# Patient Record
Sex: FEMALE | Race: WHITE | Employment: OTHER | URBAN - NONMETROPOLITAN AREA
[De-identification: names, ages, dates, MRNs, and addresses within clinical notes are randomized per-mention and may not be internally consistent; named-entity substitution may affect disease eponyms.]

---

## 2017-03-08 ENCOUNTER — OFFICE VISIT (OUTPATIENT)
Dept: GASTROENTEROLOGY | Age: 72
End: 2017-03-08
Payer: MEDICARE

## 2017-03-08 VITALS
HEART RATE: 70 BPM | HEIGHT: 65 IN | OXYGEN SATURATION: 98 % | SYSTOLIC BLOOD PRESSURE: 112 MMHG | DIASTOLIC BLOOD PRESSURE: 72 MMHG | BODY MASS INDEX: 21.99 KG/M2 | WEIGHT: 132 LBS

## 2017-03-08 DIAGNOSIS — Z86.010 PERSONAL HISTORY OF COLONIC POLYPS: ICD-10-CM

## 2017-03-08 DIAGNOSIS — Z83.71 FAMILY HISTORY OF POLYPS IN THE COLON: ICD-10-CM

## 2017-03-08 DIAGNOSIS — Z12.11 ENCOUNTER FOR SCREENING COLONOSCOPY: Primary | ICD-10-CM

## 2017-03-08 PROBLEM — Z83.719 FAMILY HISTORY OF POLYPS IN THE COLON: Status: ACTIVE | Noted: 2017-03-08

## 2017-03-08 PROBLEM — Z86.0100 PERSONAL HISTORY OF COLONIC POLYPS: Status: ACTIVE | Noted: 2017-03-08

## 2017-03-08 PROCEDURE — 4040F PNEUMOC VAC/ADMIN/RCVD: CPT | Performed by: NURSE PRACTITIONER

## 2017-03-08 PROCEDURE — G8419 CALC BMI OUT NRM PARAM NOF/U: HCPCS | Performed by: NURSE PRACTITIONER

## 2017-03-08 PROCEDURE — 1036F TOBACCO NON-USER: CPT | Performed by: NURSE PRACTITIONER

## 2017-03-08 PROCEDURE — 1090F PRES/ABSN URINE INCON ASSESS: CPT | Performed by: NURSE PRACTITIONER

## 2017-03-08 PROCEDURE — 99214 OFFICE O/P EST MOD 30 MIN: CPT | Performed by: NURSE PRACTITIONER

## 2017-03-08 PROCEDURE — G8484 FLU IMMUNIZE NO ADMIN: HCPCS | Performed by: NURSE PRACTITIONER

## 2017-03-08 PROCEDURE — 3014F SCREEN MAMMO DOC REV: CPT | Performed by: NURSE PRACTITIONER

## 2017-03-08 PROCEDURE — G8427 DOCREV CUR MEDS BY ELIG CLIN: HCPCS | Performed by: NURSE PRACTITIONER

## 2017-03-08 PROCEDURE — 1123F ACP DISCUSS/DSCN MKR DOCD: CPT | Performed by: NURSE PRACTITIONER

## 2017-03-08 PROCEDURE — 3017F COLORECTAL CA SCREEN DOC REV: CPT | Performed by: NURSE PRACTITIONER

## 2017-03-08 PROCEDURE — G8399 PT W/DXA RESULTS DOCUMENT: HCPCS | Performed by: NURSE PRACTITIONER

## 2017-03-08 RX ORDER — LORAZEPAM 1 MG/1
2 TABLET ORAL NIGHTLY
COMMUNITY

## 2017-03-08 RX ORDER — LOSARTAN POTASSIUM 50 MG/1
100 TABLET ORAL DAILY
COMMUNITY

## 2017-03-08 ASSESSMENT — ENCOUNTER SYMPTOMS
SORE THROAT: 0
ANAL BLEEDING: 0
BACK PAIN: 0
NAUSEA: 0
BLOOD IN STOOL: 0
CHOKING: 0
PHOTOPHOBIA: 0
WHEEZING: 0
COUGH: 0
ABDOMINAL DISTENTION: 0
DIARRHEA: 0
ABDOMINAL PAIN: 0
RECTAL PAIN: 0
VOMITING: 0
CONSTIPATION: 0

## 2017-03-28 ENCOUNTER — TRANSCRIBE ORDERS (OUTPATIENT)
Dept: ADMINISTRATIVE | Facility: HOSPITAL | Age: 72
End: 2017-03-28

## 2017-03-28 DIAGNOSIS — Z12.31 ENCOUNTER FOR SCREENING MAMMOGRAM FOR MALIGNANT NEOPLASM OF BREAST: Primary | ICD-10-CM

## 2017-03-29 ENCOUNTER — HOSPITAL ENCOUNTER (OUTPATIENT)
Dept: MAMMOGRAPHY | Facility: HOSPITAL | Age: 72
Discharge: HOME | End: 2017-03-29
Attending: INTERNAL MEDICINE | Admitting: INTERNAL MEDICINE

## 2017-03-29 DIAGNOSIS — Z12.31 ENCOUNTER FOR SCREENING MAMMOGRAM FOR MALIGNANT NEOPLASM OF BREAST: ICD-10-CM

## 2017-03-29 PROCEDURE — G0202 SCR MAMMO BI INCL CAD: HCPCS

## 2017-03-29 PROCEDURE — 77063 BREAST TOMOSYNTHESIS BI: CPT

## 2017-04-13 ENCOUNTER — HOSPITAL ENCOUNTER (OUTPATIENT)
Age: 72
Setting detail: OUTPATIENT SURGERY
Discharge: HOME | End: 2017-04-13
Attending: INTERNAL MEDICINE | Admitting: INTERNAL MEDICINE
Payer: MEDICARE

## 2017-04-13 ENCOUNTER — ANESTHESIA EVENT (OUTPATIENT)
Dept: ENDOSCOPY | Age: 72
End: 2017-04-13
Payer: MEDICARE

## 2017-04-13 ENCOUNTER — ANESTHESIA (OUTPATIENT)
Dept: ENDOSCOPY | Age: 72
End: 2017-04-13
Payer: MEDICARE

## 2017-04-13 VITALS
RESPIRATION RATE: 18 BRPM | WEIGHT: 129 LBS | HEIGHT: 65 IN | SYSTOLIC BLOOD PRESSURE: 143 MMHG | TEMPERATURE: 98 F | DIASTOLIC BLOOD PRESSURE: 63 MMHG | BODY MASS INDEX: 21.49 KG/M2 | OXYGEN SATURATION: 100 % | HEART RATE: 65 BPM

## 2017-04-13 VITALS
SYSTOLIC BLOOD PRESSURE: 108 MMHG | OXYGEN SATURATION: 96 % | RESPIRATION RATE: 16 BRPM | DIASTOLIC BLOOD PRESSURE: 47 MMHG

## 2017-04-13 PROCEDURE — 7100000011 HC PHASE II RECOVERY - ADDTL 15 MIN: Performed by: INTERNAL MEDICINE

## 2017-04-13 PROCEDURE — 3609009500 HC COLONOSCOPY DIAGNOSTIC OR SCREENING: Performed by: INTERNAL MEDICINE

## 2017-04-13 PROCEDURE — 2500000003 HC RX 250 WO HCPCS: Performed by: NURSE ANESTHETIST, CERTIFIED REGISTERED

## 2017-04-13 PROCEDURE — 6360000002 HC RX W HCPCS: Performed by: NURSE ANESTHETIST, CERTIFIED REGISTERED

## 2017-04-13 PROCEDURE — 3700000000 HC ANESTHESIA ATTENDED CARE: Performed by: INTERNAL MEDICINE

## 2017-04-13 PROCEDURE — 2580000003 HC RX 258: Performed by: INTERNAL MEDICINE

## 2017-04-13 PROCEDURE — 7100000010 HC PHASE II RECOVERY - FIRST 15 MIN: Performed by: INTERNAL MEDICINE

## 2017-04-13 PROCEDURE — 3700000001 HC ADD 15 MINUTES (ANESTHESIA): Performed by: INTERNAL MEDICINE

## 2017-04-13 PROCEDURE — G0105 COLORECTAL SCRN; HI RISK IND: HCPCS | Performed by: INTERNAL MEDICINE

## 2017-04-13 RX ORDER — LIDOCAINE HYDROCHLORIDE 20 MG/ML
INJECTION, SOLUTION INFILTRATION; PERINEURAL PRN
Status: DISCONTINUED | OUTPATIENT
Start: 2017-04-13 | End: 2017-04-13 | Stop reason: SDUPTHER

## 2017-04-13 RX ORDER — DIPHENHYDRAMINE HYDROCHLORIDE 50 MG/ML
12.5 INJECTION INTRAMUSCULAR; INTRAVENOUS
Status: DISCONTINUED | OUTPATIENT
Start: 2017-04-13 | End: 2017-04-13 | Stop reason: HOSPADM

## 2017-04-13 RX ORDER — PROPOFOL 10 MG/ML
INJECTION, EMULSION INTRAVENOUS PRN
Status: DISCONTINUED | OUTPATIENT
Start: 2017-04-13 | End: 2017-04-13 | Stop reason: SDUPTHER

## 2017-04-13 RX ORDER — PROMETHAZINE HYDROCHLORIDE 25 MG/ML
6.25 INJECTION, SOLUTION INTRAMUSCULAR; INTRAVENOUS
Status: DISCONTINUED | OUTPATIENT
Start: 2017-04-13 | End: 2017-04-13 | Stop reason: HOSPADM

## 2017-04-13 RX ORDER — LANOLIN ALCOHOL/MO/W.PET/CERES
500 CREAM (GRAM) TOPICAL NIGHTLY
COMMUNITY
End: 2021-12-20

## 2017-04-13 RX ORDER — SODIUM CHLORIDE, SODIUM LACTATE, POTASSIUM CHLORIDE, CALCIUM CHLORIDE 600; 310; 30; 20 MG/100ML; MG/100ML; MG/100ML; MG/100ML
INJECTION, SOLUTION INTRAVENOUS CONTINUOUS
Status: DISCONTINUED | OUTPATIENT
Start: 2017-04-13 | End: 2017-04-13 | Stop reason: HOSPADM

## 2017-04-13 RX ORDER — ONDANSETRON 2 MG/ML
4 INJECTION INTRAMUSCULAR; INTRAVENOUS
Status: DISCONTINUED | OUTPATIENT
Start: 2017-04-13 | End: 2017-04-13 | Stop reason: HOSPADM

## 2017-04-13 RX ORDER — LIDOCAINE HYDROCHLORIDE 10 MG/ML
1 INJECTION, SOLUTION INFILTRATION; PERINEURAL ONCE
Status: DISCONTINUED | OUTPATIENT
Start: 2017-04-13 | End: 2017-04-13 | Stop reason: HOSPADM

## 2017-04-13 RX ADMIN — LIDOCAINE HYDROCHLORIDE 40 MG: 20 INJECTION, SOLUTION INFILTRATION; PERINEURAL at 09:11

## 2017-04-13 RX ADMIN — SODIUM CHLORIDE, POTASSIUM CHLORIDE, SODIUM LACTATE AND CALCIUM CHLORIDE: 600; 310; 30; 20 INJECTION, SOLUTION INTRAVENOUS at 08:25

## 2017-04-13 RX ADMIN — PROPOFOL 300 MG: 10 INJECTION, EMULSION INTRAVENOUS at 09:11

## 2017-04-13 ASSESSMENT — PAIN SCALES - GENERAL
PAINLEVEL_OUTOF10: 0
PAINLEVEL_OUTOF10: 0

## 2017-04-13 ASSESSMENT — PAIN - FUNCTIONAL ASSESSMENT: PAIN_FUNCTIONAL_ASSESSMENT: 0-10

## 2017-05-08 ENCOUNTER — TELEPHONE (OUTPATIENT)
Dept: VASCULAR SURGERY | Facility: CLINIC | Age: 72
End: 2017-05-08

## 2017-07-05 ENCOUNTER — TELEPHONE (OUTPATIENT)
Dept: UROLOGY | Facility: CLINIC | Age: 72
End: 2017-07-05

## 2017-07-05 DIAGNOSIS — N20.0 RENAL CALCULUS, BILATERAL: Primary | ICD-10-CM

## 2017-07-11 ENCOUNTER — PROCEDURE VISIT (OUTPATIENT)
Dept: UROLOGY | Facility: CLINIC | Age: 72
End: 2017-07-11

## 2017-07-11 VITALS
HEIGHT: 65 IN | WEIGHT: 128 LBS | DIASTOLIC BLOOD PRESSURE: 84 MMHG | BODY MASS INDEX: 21.33 KG/M2 | TEMPERATURE: 97.5 F | SYSTOLIC BLOOD PRESSURE: 122 MMHG

## 2017-07-11 DIAGNOSIS — N95.2 ATROPHIC VAGINITIS: Primary | ICD-10-CM

## 2017-07-11 DIAGNOSIS — N39.0 URINARY TRACT INFECTION, SITE UNSPECIFIED: ICD-10-CM

## 2017-07-11 LAB
BILIRUB BLD-MCNC: NEGATIVE MG/DL
CLARITY, POC: CLEAR
COLOR UR: YELLOW
GLUCOSE UR STRIP-MCNC: NEGATIVE MG/DL
KETONES UR QL: NEGATIVE
LEUKOCYTE EST, POC: ABNORMAL
NITRITE UR-MCNC: NEGATIVE MG/ML
PH UR: 7 [PH] (ref 5–8)
PROT UR STRIP-MCNC: NEGATIVE MG/DL
RBC # UR STRIP: ABNORMAL /UL
SP GR UR: 1.01 (ref 1–1.03)
UROBILINOGEN UR QL: NORMAL

## 2017-07-11 PROCEDURE — 81003 URINALYSIS AUTO W/O SCOPE: CPT | Performed by: UROLOGY

## 2017-07-11 PROCEDURE — 51701 INSERT BLADDER CATHETER: CPT | Performed by: UROLOGY

## 2017-07-11 PROCEDURE — 87186 SC STD MICRODIL/AGAR DIL: CPT | Performed by: UROLOGY

## 2017-07-11 PROCEDURE — 87086 URINE CULTURE/COLONY COUNT: CPT | Performed by: UROLOGY

## 2017-07-11 PROCEDURE — 99211 OFF/OP EST MAY X REQ PHY/QHP: CPT | Performed by: UROLOGY

## 2017-07-11 PROCEDURE — 87088 URINE BACTERIA CULTURE: CPT | Performed by: UROLOGY

## 2017-07-11 NOTE — PROGRESS NOTES
Patient of Dr. Ba is here today complaining of burning and frequency. The patient denies any fever, chills, N&V. Patient then had a catheter placed and urine was collected in a sterile matter. UA Auto Dip ran and urine sent for C&S.    Dr. Toure was in the office at the time of procedure. Patient was advised that we will call with results. Patient verbalized understanding.     reviewed

## 2017-07-13 ENCOUNTER — TELEPHONE (OUTPATIENT)
Dept: UROLOGY | Facility: CLINIC | Age: 72
End: 2017-07-13

## 2017-07-13 DIAGNOSIS — N30.00 ACUTE CYSTITIS WITHOUT HEMATURIA: Primary | ICD-10-CM

## 2017-07-13 LAB — BACTERIA SPEC AEROBE CULT: ABNORMAL

## 2017-07-13 RX ORDER — SULFAMETHOXAZOLE AND TRIMETHOPRIM 400; 80 MG/1; MG/1
1 TABLET ORAL 2 TIMES DAILY
Qty: 6 TABLET | Refills: 0 | Status: SHIPPED | OUTPATIENT
Start: 2017-07-13 | End: 2017-08-24

## 2017-07-14 ENCOUNTER — TELEPHONE (OUTPATIENT)
Dept: UROLOGY | Facility: CLINIC | Age: 72
End: 2017-07-14

## 2017-07-14 NOTE — TELEPHONE ENCOUNTER
----- Message from Lazaro Ba MD sent at 7/13/2017  4:59 PM CDT -----  Patient has a positive urine culture.  I left a voicemail on her answering machine because she was not available and I felt it important to put her on antibiotics tonight.  I called in a prescription to [facility] and will have the staff follow-up that she got the message that the antibiotic is been called in.  I called in Bactrim DS 1 by mouth twice a day for 3 days.

## 2017-07-18 ENCOUNTER — HOSPITAL ENCOUNTER (OUTPATIENT)
Dept: GENERAL RADIOLOGY | Facility: HOSPITAL | Age: 72
Discharge: HOME | End: 2017-07-18
Attending: UROLOGY | Admitting: UROLOGY

## 2017-07-18 PROCEDURE — 74000 HC ABDOMEN KUB: CPT

## 2017-07-20 ENCOUNTER — OFFICE VISIT (OUTPATIENT)
Dept: UROLOGY | Facility: CLINIC | Age: 72
End: 2017-07-20

## 2017-07-20 VITALS
SYSTOLIC BLOOD PRESSURE: 114 MMHG | BODY MASS INDEX: 21.33 KG/M2 | DIASTOLIC BLOOD PRESSURE: 82 MMHG | TEMPERATURE: 97.6 F | WEIGHT: 128 LBS | HEIGHT: 65 IN

## 2017-07-20 DIAGNOSIS — N30.00 ACUTE CYSTITIS WITHOUT HEMATURIA: ICD-10-CM

## 2017-07-20 DIAGNOSIS — N20.0 RENAL CALCULUS, BILATERAL: Primary | ICD-10-CM

## 2017-07-20 LAB
BILIRUB BLD-MCNC: NEGATIVE MG/DL
CLARITY, POC: CLEAR
COLOR UR: YELLOW
GLUCOSE UR STRIP-MCNC: NEGATIVE MG/DL
KETONES UR QL: NEGATIVE
LEUKOCYTE EST, POC: ABNORMAL
NITRITE UR-MCNC: NEGATIVE MG/ML
PH UR: 6 [PH] (ref 5–8)
PROT UR STRIP-MCNC: NEGATIVE MG/DL
RBC # UR STRIP: ABNORMAL /UL
SP GR UR: 1.02 (ref 1–1.03)
UROBILINOGEN UR QL: NORMAL

## 2017-07-20 PROCEDURE — 99213 OFFICE O/P EST LOW 20 MIN: CPT | Performed by: UROLOGY

## 2017-07-20 PROCEDURE — 81003 URINALYSIS AUTO W/O SCOPE: CPT | Performed by: UROLOGY

## 2017-07-20 RX ORDER — SODIUM PHOSPHATE,MONO-DIBASIC 19G-7G/118
ENEMA (ML) RECTAL
COMMUNITY
End: 2018-12-12

## 2017-07-20 RX ORDER — UREA 10 %
27 LOTION (ML) TOPICAL 2 TIMES DAILY
COMMUNITY
End: 2018-07-24

## 2017-07-20 NOTE — PROGRESS NOTES
Ms. Juarez is 71 y.o. female    CHIEF COMPLAINT: I am here today to follow up for Kidney Stones. I did complete Imaging on Monday.    HPI  Renal Urolithiasis  The patient presents with bilateral renal urolithiasis. This was initially diagnosed approximately 4 years ago. This was identified on CT that included abdominal cuts. This was found in the context of an evaluation of hx of urolithiaisis. This is a(n) established problem for the patient. The onset of this was unknown. The course is stable. The patient is currently being managed by dietary alterations and hydration. Current symptoms that may or may not be related to this stone include none reported.    Recent cystitis symptoms have improved. Her dysuria is better.     The following portions of the patient's history were reviewed and updated as appropriate: allergies, current medications, past family history, past medical history, past social history, past surgical history and problem list.    Review of Systems   Constitutional: Negative for chills and fever.   Gastrointestinal: Negative for abdominal pain, anal bleeding and blood in stool.   Genitourinary: Negative for flank pain and hematuria.         Current Outpatient Prescriptions:   •  calcium citrate-vitamin d (CITRACAL) 200-250 MG-UNIT tablet tablet, Take  by mouth Daily., Disp: , Rfl:   •  glucosamine sulfate 500 MG capsule capsule, Take  by mouth 3 (Three) Times a Day With Meals., Disp: , Rfl:   •  magnesium, as, gluconate (MAGONATE) 500 (27 MG) MG tablet, Take 27 mg by mouth 2 (Two) Times a Day., Disp: , Rfl:   •  cetirizine (ZyrTEC) 10 MG tablet, Take 10 mg by mouth daily., Disp: , Rfl:   •  fluticasone (FLONASE) 50 MCG/ACT nasal spray, 2 sprays into each nostril daily. Administer 2 sprays in each nostril for each dose., Disp: , Rfl:   •  levothyroxine (SYNTHROID, LEVOTHROID) 125 MCG tablet, Take 125 mcg by mouth daily., Disp: , Rfl:   •  lisinopril (PRINIVIL,ZESTRIL) 10 MG tablet, Take 10 mg by  "mouth daily., Disp: , Rfl:   •  LORazepam (ATIVAN) 1 MG tablet, Take 1 mg by mouth every 8 (eight) hours as needed for anxiety., Disp: , Rfl:   •  meloxicam (MOBIC) 15 MG tablet, Take 1 tablet by mouth daily., Disp: , Rfl: 5  •  Omega 3-6-9 Fatty Acids (OMEGA 3-6-9 COMPLEX PO), Take  by mouth., Disp: , Rfl:   •  sulfamethoxazole-trimethoprim (BACTRIM,SEPTRA) 400-80 MG tablet, Take 1 tablet by mouth 2 (Two) Times a Day., Disp: 6 tablet, Rfl: 0  •  temazepam (RESTORIL) 30 MG capsule, Take 1 capsule by mouth daily., Disp: , Rfl: 2    Past Medical History:   Diagnosis Date   • Abdominal pain    • Anticoagulant-induced hematuria    • Calculus of kidney     LEFT   • Clot hematuria    • Dysuria    • Hypothyroidism    • Urgency of urination    • UTI (urinary tract infection) with pyuria        Past Surgical History:   Procedure Laterality Date   • CHOLECYSTECTOMY     • CYSTOSCOPY  09/2002   • HERNIA REPAIR     • TONSILLECTOMY         Social History     Social History   • Marital status:      Spouse name: N/A   • Number of children: N/A   • Years of education: N/A     Social History Main Topics   • Smoking status: Former Smoker   • Smokeless tobacco: None   • Alcohol use No   • Drug use: No   • Sexual activity: Not Asked     Other Topics Concern   • None     Social History Narrative       Family History   Problem Relation Age of Onset   • Prostate cancer Father    • No Known Problems Mother        /82  Temp 97.6 °F (36.4 °C)  Ht 65\" (165.1 cm)  Wt 128 lb (58.1 kg)  BMI 21.3 kg/m2    Physical Exam  Alert and oriented ×3  Not agitated or distressed  No obvious deformities  No respiratory distress  Skin without pallor or diaphoresis      Results for orders placed or performed in visit on 07/20/17   POC Urinalysis Dipstick, Automated   Result Value Ref Range    Color Yellow Yellow, Straw, Dark Yellow, Arlene    Clarity, UA Clear Clear    Glucose, UA Negative Negative, 1000 mg/dL (3+) mg/dL    Bilirubin Negative " Negative    Ketones, UA Negative Negative    Specific Gravity  1.020 1.005 - 1.030    Blood, UA Small (A) Negative    pH, Urine 6.0 5.0 - 8.0    Protein, POC Negative Negative mg/dL    Urobilinogen, UA Normal Normal    Leukocytes Trace (A) Negative    Nitrite, UA Negative Negative     Independent visualization/interpretation of KUB  A KUB is available for me to review today.  The image is inspected for a bowel gas pattern and the general bone structure of the spine and pelvis. The kidneys are then inspected closely.  Renal outline is noted if identifiable. The kidney, collecting system, and anticipated path of the ureter are examined for calcifications including those in the true pelvis.  This film reveals: small stones in the left lower pole. I think I see some small stones in the right lower pole as well.       Assessment and Plan  Diagnoses and all orders for this visit:    Renal calculus, bilateral  -     POC Urinalysis Dipstick, Automated    Acute cystitis without hematuria    The patient has been diagnosed with renal urolithiasis. Location is described as  bilateral. The patient's options for therapy are discussed based on the size, location, stone burden, and symptoms.  The decision has been made to follow these stones radiographically without treatment.  The patient understands the risks associated with the conservative approach, but this is a reasonable treatment plan.  The need to contract me for hematuria, fever, recurrent UTI's, flank pain or change in ideology is explained.  Established problem,stable,chronic    Her urinary tract infection has resolved. REcently treated with Bactrim DS for three days for an Ecoli UTI. Urine is clear today.     Lazaro Ba MD  07/20/17  2:35 PM      Cc: Dr. Hair Echols

## 2017-08-24 ENCOUNTER — OFFICE VISIT (OUTPATIENT)
Dept: UROLOGY | Facility: CLINIC | Age: 72
End: 2017-08-24

## 2017-08-24 VITALS — TEMPERATURE: 97.2 F | WEIGHT: 128 LBS | BODY MASS INDEX: 21.33 KG/M2 | HEIGHT: 65 IN

## 2017-08-24 DIAGNOSIS — R39.15 URINARY URGENCY: Primary | ICD-10-CM

## 2017-08-24 DIAGNOSIS — R30.0 DYSURIA: ICD-10-CM

## 2017-08-24 DIAGNOSIS — N95.2 ATROPHIC VAGINITIS: ICD-10-CM

## 2017-08-24 LAB
BILIRUB BLD-MCNC: NEGATIVE MG/DL
CLARITY, POC: CLEAR
COLOR UR: YELLOW
GLUCOSE UR STRIP-MCNC: NEGATIVE MG/DL
KETONES UR QL: NEGATIVE
LEUKOCYTE EST, POC: ABNORMAL
NITRITE UR-MCNC: POSITIVE MG/ML
PH UR: 7 [PH] (ref 5–8)
PROT UR STRIP-MCNC: ABNORMAL MG/DL
RBC # UR STRIP: ABNORMAL /UL
SP GR UR: 1.02 (ref 1–1.03)
UROBILINOGEN UR QL: NORMAL

## 2017-08-24 PROCEDURE — 99214 OFFICE O/P EST MOD 30 MIN: CPT | Performed by: UROLOGY

## 2017-08-24 PROCEDURE — 87086 URINE CULTURE/COLONY COUNT: CPT | Performed by: UROLOGY

## 2017-08-24 PROCEDURE — 81003 URINALYSIS AUTO W/O SCOPE: CPT | Performed by: UROLOGY

## 2017-08-24 PROCEDURE — 51701 INSERT BLADDER CATHETER: CPT | Performed by: UROLOGY

## 2017-08-24 PROCEDURE — 87186 SC STD MICRODIL/AGAR DIL: CPT | Performed by: UROLOGY

## 2017-08-24 PROCEDURE — 87088 URINE BACTERIA CULTURE: CPT | Performed by: UROLOGY

## 2017-08-24 NOTE — PROGRESS NOTES
Ms. Juarez is 72 y.o. female    CHIEF COMPLAINT: I am here today because of urinary urgency.     HPI  She presents today with acute onset of urgency and frequency.  She has some terminal dysuria noted.  All the pain is at the urethral area.  In the past antibiotics that helped the symptoms.  She has recently finished another course of antibiotic.  This episode started 5 days ago.  There is been no associated fever or flank pain.  The following portions of the patient's history were reviewed and updated as appropriate: allergies, current medications, past family history, past medical history, past social history, past surgical history and problem list.    Review of Systems   Constitutional: Negative for chills and fever.   Gastrointestinal: Negative for abdominal pain, anal bleeding and blood in stool.   Genitourinary: Negative for flank pain and hematuria.         Current Outpatient Prescriptions:   •  calcium citrate-vitamin d (CITRACAL) 200-250 MG-UNIT tablet tablet, Take  by mouth Daily., Disp: , Rfl:   •  cetirizine (ZyrTEC) 10 MG tablet, Take 10 mg by mouth daily., Disp: , Rfl:   •  fluticasone (FLONASE) 50 MCG/ACT nasal spray, 2 sprays into each nostril daily. Administer 2 sprays in each nostril for each dose., Disp: , Rfl:   •  glucosamine sulfate 500 MG capsule capsule, Take  by mouth 3 (Three) Times a Day With Meals., Disp: , Rfl:   •  levothyroxine (SYNTHROID, LEVOTHROID) 125 MCG tablet, Take 125 mcg by mouth daily., Disp: , Rfl:   •  lisinopril (PRINIVIL,ZESTRIL) 10 MG tablet, Take 10 mg by mouth daily., Disp: , Rfl:   •  LORazepam (ATIVAN) 1 MG tablet, Take 1 mg by mouth every 8 (eight) hours as needed for anxiety., Disp: , Rfl:   •  magnesium, as, gluconate (MAGONATE) 500 (27 MG) MG tablet, Take 27 mg by mouth 2 (Two) Times a Day., Disp: , Rfl:   •  meloxicam (MOBIC) 15 MG tablet, Take 1 tablet by mouth daily., Disp: , Rfl: 5  •  Omega 3-6-9 Fatty Acids (OMEGA 3-6-9 COMPLEX PO), Take  by mouth., Disp:  ", Rfl:   •  temazepam (RESTORIL) 30 MG capsule, Take 1 capsule by mouth daily., Disp: , Rfl: 2    Past Medical History:   Diagnosis Date   • Abdominal pain    • Anticoagulant-induced hematuria    • Calculus of kidney     LEFT   • Clot hematuria    • Dysuria    • Hypothyroidism    • Urgency of urination    • UTI (urinary tract infection) with pyuria        Past Surgical History:   Procedure Laterality Date   • CHOLECYSTECTOMY     • CYSTOSCOPY  09/2002   • HERNIA REPAIR     • TONSILLECTOMY         Social History     Social History   • Marital status:      Spouse name: N/A   • Number of children: N/A   • Years of education: N/A     Social History Main Topics   • Smoking status: Former Smoker   • Smokeless tobacco: None   • Alcohol use No   • Drug use: No   • Sexual activity: Not Asked     Other Topics Concern   • None     Social History Narrative       Family History   Problem Relation Age of Onset   • Prostate cancer Father    • No Known Problems Mother        Temp 97.2 °F (36.2 °C)  Ht 65\" (165.1 cm)  Wt 128 lb (58.1 kg)  BMI 21.3 kg/m2    Physical Exam   Constitutional: She is oriented to person, place, and time. She appears well-developed and well-nourished. No distress.   Neck: Normal range of motion. Neck supple. No tracheal deviation present. No thyromegaly (no mass) present.   Cardiovascular: Intact distal pulses.    No significant edema or varicosities. No tenderness   Pulmonary/Chest: Effort normal. No respiratory distress. She exhibits no tenderness.   No intercostal retractions, use of accessory muscles or asymmetric movements.    Abdominal: Soft. Normal appearance. She exhibits no distension, no ascites and no mass. There is no hepatosplenomegaly. There is no tenderness. There is no rigidity, no rebound, no guarding and no CVA tenderness. No hernia.   Stool specimen is not indicated    Genitourinary: Vagina normal and uterus normal. Pelvic exam was performed with patient supine. There is no rash, " tenderness or lesion on the right labia. There is no rash, tenderness or lesion on the left labia. Uterus is not enlarged and not tender. Cervix exhibits no motion tenderness, no discharge and no friability. Right adnexum displays no mass, no tenderness and no fullness. Left adnexum displays no mass, no tenderness and no fullness. No tenderness in the vagina. No vaginal discharge found.   Genitourinary Comments: Normal hair distribution. The urethra is without mass and normal in appearance. The bladder is palpably without mass or tenderness. Vaginal epithelium is very thin particularly around the urethra.  There is complete loss of rugae .  Atrophy is considered significant. The anus and perineum are normal.    Lymphadenopathy:     She has no cervical adenopathy.        Right: No inguinal adenopathy present.        Left: No inguinal adenopathy present.   Neurological: She is alert and oriented to person, place, and time.   Skin: No ecchymosis and no rash noted. She is not diaphoretic. No cyanosis or erythema. Nails show no clubbing.   Psychiatric: She has a normal mood and affect. Her behavior is normal.   Vitals reviewed.        Results for orders placed or performed in visit on 08/24/17   POC Urinalysis Dipstick, Automated   Result Value Ref Range    Color Yellow Yellow, Straw, Dark Yellow, Arlene    Clarity, UA Clear Clear    Glucose, UA Negative Negative, 1000 mg/dL (3+) mg/dL    Bilirubin Negative Negative    Ketones, UA Negative Negative    Specific Gravity  1.020 1.005 - 1.030    Blood, UA Moderate (A) Negative    pH, Urine 7.0 5.0 - 8.0    Protein, POC 30 mg/dL (A) Negative mg/dL    Urobilinogen, UA Normal Normal    Leukocytes Large (3+) (A) Negative    Nitrite, UA Positive (A) Negative     Procedure note for in and out catheterization  She is placed in the dorsal lithotomy position.  She is carefully prepped with Betadine around the urethra.  A pediatric catheterization kit is used which contains an  approximate 5 Nepalese catheter.this is introduced into the urinary bladder.  Approximately 30 mL of urine is obtained and sent to the lab for culture and sensitivity.    Assessment and Plan  Diagnoses and all orders for this visit:    Urinary urgency  -     POC Urinalysis Dipstick, Automated  -     Urine Culture    Dysuria    Atrophic vaginitis      #1.  We will await these urine culture results and start her on antibiotics if they come back positive.  She has both penicillin and sulfa allergy which limit oral antibiotic availability.  #2.  She has significant evidence of atrophic vaginitis.  After we resolve whether or not she has a current infection I think she should go on some type of topical hormone therapy.  #3.  If her culture is positive she will need to undergo cystoscopy.Cystoscopy as the definitive lower urinary tract study is discussed . The risks of pain and discomfort, infection, and urethral stricture are discussed with the patient including the technique used in the office setting.  All patient questions were answered.   #4.  I will probably put her on antibiotic prophylaxis after she finishes it course of antibiotics should she grow bacteria on this culture.    Lazaro Ba MD  08/24/17  4:18 PM    Cc: Hair Echols MD

## 2017-08-25 ENCOUNTER — HOSPITAL ENCOUNTER (EMERGENCY)
Age: 72
Discharge: HOSPICE | End: 2017-08-26
Attending: EMERGENCY MEDICINE
Payer: MEDICARE

## 2017-08-25 ENCOUNTER — APPOINTMENT (OUTPATIENT)
Dept: CT IMAGING | Age: 72
End: 2017-08-25
Payer: MEDICARE

## 2017-08-25 VITALS
WEIGHT: 129 LBS | HEART RATE: 100 BPM | HEIGHT: 65 IN | BODY MASS INDEX: 21.49 KG/M2 | OXYGEN SATURATION: 98 % | SYSTOLIC BLOOD PRESSURE: 147 MMHG | RESPIRATION RATE: 18 BRPM | DIASTOLIC BLOOD PRESSURE: 64 MMHG | TEMPERATURE: 98.1 F

## 2017-08-25 DIAGNOSIS — N10 ACUTE PYELONEPHRITIS: Primary | ICD-10-CM

## 2017-08-25 LAB
ALBUMIN SERPL-MCNC: 4.3 G/DL (ref 3.5–5.2)
ALP BLD-CCNC: 85 U/L (ref 35–104)
ALT SERPL-CCNC: 21 U/L (ref 5–33)
ANION GAP SERPL CALCULATED.3IONS-SCNC: 15 MMOL/L (ref 7–19)
AST SERPL-CCNC: 21 U/L (ref 5–32)
BACTERIA: ABNORMAL /HPF
BASOPHILS ABSOLUTE: 0.1 K/UL (ref 0–0.2)
BASOPHILS RELATIVE PERCENT: 0.4 % (ref 0–1)
BILIRUB SERPL-MCNC: 0.8 MG/DL (ref 0.2–1.2)
BILIRUBIN URINE: NEGATIVE
BLOOD, URINE: ABNORMAL
BUN BLDV-MCNC: 20 MG/DL (ref 8–23)
CALCIUM SERPL-MCNC: 9.9 MG/DL (ref 8.8–10.2)
CHLORIDE BLD-SCNC: 97 MMOL/L (ref 98–111)
CLARITY: ABNORMAL
CO2: 24 MMOL/L (ref 22–29)
COLOR: YELLOW
CREAT SERPL-MCNC: 0.7 MG/DL (ref 0.5–0.9)
EOSINOPHILS ABSOLUTE: 0 K/UL (ref 0–0.6)
EOSINOPHILS RELATIVE PERCENT: 0.1 % (ref 0–5)
EPITHELIAL CELLS, UA: 0 /HPF (ref 0–5)
GFR NON-AFRICAN AMERICAN: >60
GLUCOSE BLD-MCNC: 111 MG/DL (ref 74–109)
GLUCOSE URINE: NEGATIVE MG/DL
HCT VFR BLD CALC: 38.1 % (ref 37–47)
HEMOGLOBIN: 12.5 G/DL (ref 12–16)
HYALINE CASTS: 5 /HPF (ref 0–8)
KETONES, URINE: NEGATIVE MG/DL
LEUKOCYTE ESTERASE, URINE: ABNORMAL
LIPASE: 23 U/L (ref 13–60)
LYMPHOCYTES ABSOLUTE: 1.4 K/UL (ref 1.1–4.5)
LYMPHOCYTES RELATIVE PERCENT: 9.2 % (ref 20–40)
MCH RBC QN AUTO: 30.9 PG (ref 27–31)
MCHC RBC AUTO-ENTMCNC: 32.8 G/DL (ref 33–37)
MCV RBC AUTO: 94.3 FL (ref 81–99)
MONOCYTES ABSOLUTE: 0.8 K/UL (ref 0–0.9)
MONOCYTES RELATIVE PERCENT: 5.5 % (ref 0–10)
NEUTROPHILS ABSOLUTE: 12.8 K/UL (ref 1.5–7.5)
NEUTROPHILS RELATIVE PERCENT: 84.4 % (ref 50–65)
NITRITE, URINE: POSITIVE
PDW BLD-RTO: 12.8 % (ref 11.5–14.5)
PH UA: 7.5
PLATELET # BLD: 204 K/UL (ref 130–400)
PMV BLD AUTO: 9.8 FL (ref 9.4–12.3)
POTASSIUM SERPL-SCNC: 4 MMOL/L (ref 3.5–5)
PROTEIN UA: NEGATIVE MG/DL
RBC # BLD: 4.04 M/UL (ref 4.2–5.4)
RBC UA: 9 /HPF (ref 0–4)
SODIUM BLD-SCNC: 136 MMOL/L (ref 136–145)
SPECIFIC GRAVITY UA: 1.02
TOTAL PROTEIN: 8.5 G/DL (ref 6.6–8.7)
UROBILINOGEN, URINE: 0.2 E.U./DL
WBC # BLD: 15.2 K/UL (ref 4.8–10.8)
WBC UA: 53 /HPF (ref 0–5)

## 2017-08-25 PROCEDURE — 80053 COMPREHEN METABOLIC PANEL: CPT

## 2017-08-25 PROCEDURE — 99284 EMERGENCY DEPT VISIT MOD MDM: CPT

## 2017-08-25 PROCEDURE — 6360000004 HC RX CONTRAST MEDICATION: Performed by: EMERGENCY MEDICINE

## 2017-08-25 PROCEDURE — 74177 CT ABD & PELVIS W/CONTRAST: CPT

## 2017-08-25 PROCEDURE — 96374 THER/PROPH/DIAG INJ IV PUSH: CPT

## 2017-08-25 PROCEDURE — 96375 TX/PRO/DX INJ NEW DRUG ADDON: CPT

## 2017-08-25 PROCEDURE — 83690 ASSAY OF LIPASE: CPT

## 2017-08-25 PROCEDURE — 87086 URINE CULTURE/COLONY COUNT: CPT

## 2017-08-25 PROCEDURE — 85025 COMPLETE CBC W/AUTO DIFF WBC: CPT

## 2017-08-25 PROCEDURE — 36415 COLL VENOUS BLD VENIPUNCTURE: CPT

## 2017-08-25 PROCEDURE — 81001 URINALYSIS AUTO W/SCOPE: CPT

## 2017-08-25 PROCEDURE — 99283 EMERGENCY DEPT VISIT LOW MDM: CPT | Performed by: EMERGENCY MEDICINE

## 2017-08-25 PROCEDURE — 2580000003 HC RX 258: Performed by: EMERGENCY MEDICINE

## 2017-08-25 PROCEDURE — 6360000002 HC RX W HCPCS: Performed by: EMERGENCY MEDICINE

## 2017-08-25 PROCEDURE — 87185 SC STD ENZYME DETCJ PER NZM: CPT

## 2017-08-25 RX ORDER — CEFDINIR 300 MG/1
300 CAPSULE ORAL 2 TIMES DAILY
Qty: 20 CAPSULE | Refills: 0 | Status: SHIPPED | OUTPATIENT
Start: 2017-08-25 | End: 2017-09-04

## 2017-08-25 RX ORDER — 0.9 % SODIUM CHLORIDE 0.9 %
1000 INTRAVENOUS SOLUTION INTRAVENOUS ONCE
Status: COMPLETED | OUTPATIENT
Start: 2017-08-25 | End: 2017-08-26

## 2017-08-25 RX ORDER — HYDROCODONE BITARTRATE AND ACETAMINOPHEN 5; 325 MG/1; MG/1
1 TABLET ORAL EVERY 6 HOURS PRN
Qty: 15 TABLET | Refills: 0 | Status: SHIPPED | OUTPATIENT
Start: 2017-08-25 | End: 2017-09-01

## 2017-08-25 RX ORDER — MORPHINE SULFATE 4 MG/ML
4 INJECTION, SOLUTION INTRAMUSCULAR; INTRAVENOUS ONCE
Status: COMPLETED | OUTPATIENT
Start: 2017-08-25 | End: 2017-08-25

## 2017-08-25 RX ORDER — ONDANSETRON 4 MG/1
4 TABLET, ORALLY DISINTEGRATING ORAL EVERY 8 HOURS PRN
Qty: 15 TABLET | Refills: 0 | Status: SHIPPED | OUTPATIENT
Start: 2017-08-25 | End: 2022-01-21

## 2017-08-25 RX ORDER — ONDANSETRON 2 MG/ML
4 INJECTION INTRAMUSCULAR; INTRAVENOUS ONCE
Status: COMPLETED | OUTPATIENT
Start: 2017-08-25 | End: 2017-08-25

## 2017-08-25 RX ADMIN — CEFTRIAXONE 1 G: 1 INJECTION, POWDER, FOR SOLUTION INTRAMUSCULAR; INTRAVENOUS at 23:47

## 2017-08-25 RX ADMIN — IOVERSOL 90 ML: 741 INJECTION INTRA-ARTERIAL; INTRAVENOUS at 22:17

## 2017-08-25 RX ADMIN — SODIUM CHLORIDE 1000 ML: 9 INJECTION, SOLUTION INTRAVENOUS at 22:04

## 2017-08-25 RX ADMIN — MORPHINE SULFATE 4 MG: 4 INJECTION, SOLUTION INTRAMUSCULAR; INTRAVENOUS at 22:36

## 2017-08-25 RX ADMIN — ONDANSETRON 4 MG: 2 INJECTION INTRAMUSCULAR; INTRAVENOUS at 22:36

## 2017-08-25 ASSESSMENT — PAIN SCALES - GENERAL
PAINLEVEL_OUTOF10: 10
PAINLEVEL_OUTOF10: 9
PAINLEVEL_OUTOF10: 8
PAINLEVEL_OUTOF10: 1

## 2017-08-26 LAB — BACTERIA SPEC AEROBE CULT: ABNORMAL

## 2017-08-26 ASSESSMENT — ENCOUNTER SYMPTOMS
BLOOD IN STOOL: 0
DIARRHEA: 0
VOMITING: 0
COUGH: 0
BACK PAIN: 0
ABDOMINAL PAIN: 1
NAUSEA: 1
SHORTNESS OF BREATH: 0

## 2017-08-27 LAB
ORGANISM: ABNORMAL
URINE CULTURE, ROUTINE: ABNORMAL
URINE CULTURE, ROUTINE: ABNORMAL

## 2017-08-28 ENCOUNTER — TELEPHONE (OUTPATIENT)
Dept: UROLOGY | Facility: CLINIC | Age: 72
End: 2017-08-28

## 2017-08-28 NOTE — TELEPHONE ENCOUNTER
Spoke with  and informed him that the antibiotic patient got at [facility] will treat  Her infection. He voiced understanding

## 2017-08-29 NOTE — TELEPHONE ENCOUNTER
Her culture returned positive for Escherichia coli.  Unfortunately she have to go to the emergency room at [facility] because she developed some flank pain with this.  They started her on Omnicef which is appropriate antibiotic choice given the culture which grew Escherichia coli.

## 2017-08-29 NOTE — TELEPHONE ENCOUNTER
----- Message from Sari Nair CMA sent at 8/28/2017 10:46 AM CDT -----  Urine culture positive what would you like patient to take for this

## 2017-09-14 ENCOUNTER — PROCEDURE VISIT (OUTPATIENT)
Dept: UROLOGY | Facility: CLINIC | Age: 72
End: 2017-09-14

## 2017-09-14 VITALS
BODY MASS INDEX: 21.43 KG/M2 | WEIGHT: 128.6 LBS | TEMPERATURE: 96.3 F | SYSTOLIC BLOOD PRESSURE: 118 MMHG | HEIGHT: 65 IN | DIASTOLIC BLOOD PRESSURE: 58 MMHG

## 2017-09-14 DIAGNOSIS — N39.0 URINARY TRACT INFECTION, SITE UNSPECIFIED: Primary | ICD-10-CM

## 2017-09-14 LAB
BILIRUB BLD-MCNC: NEGATIVE MG/DL
CLARITY, POC: CLEAR
COLOR UR: YELLOW
GLUCOSE UR STRIP-MCNC: NEGATIVE MG/DL
KETONES UR QL: NEGATIVE
LEUKOCYTE EST, POC: ABNORMAL
NITRITE UR-MCNC: NEGATIVE MG/ML
PH UR: 5.5 [PH] (ref 5–8)
PROT UR STRIP-MCNC: NEGATIVE MG/DL
RBC # UR STRIP: ABNORMAL /UL
SP GR UR: 1.02 (ref 1–1.03)
UROBILINOGEN UR QL: NORMAL

## 2017-09-14 PROCEDURE — 99211 OFF/OP EST MAY X REQ PHY/QHP: CPT | Performed by: UROLOGY

## 2017-09-14 PROCEDURE — 51701 INSERT BLADDER CATHETER: CPT | Performed by: UROLOGY

## 2017-09-14 PROCEDURE — 87086 URINE CULTURE/COLONY COUNT: CPT | Performed by: UROLOGY

## 2017-09-14 PROCEDURE — 87186 SC STD MICRODIL/AGAR DIL: CPT | Performed by: UROLOGY

## 2017-09-14 PROCEDURE — 81003 URINALYSIS AUTO W/O SCOPE: CPT | Performed by: UROLOGY

## 2017-09-14 PROCEDURE — 87088 URINE BACTERIA CULTURE: CPT | Performed by: UROLOGY

## 2017-09-14 NOTE — PROGRESS NOTES
"Patient of Dr. Ba is here today complaining of Burning with urination. The patient denies any Fever, nausea, or vomitting. Catheterized urine specimen obtained using sterile technique and a Female Speci-Cath Kit * FR 6.5\". UA Auto Dip ran and urine sent for C&S. The patient tolerated the procedure well with no complications.     Dr. Ba was in the office at the time of procedure. Patient was advised that we will call with results. Patient verbalized understanding.     reviewed  "

## 2017-09-17 LAB — BACTERIA SPEC AEROBE CULT: ABNORMAL

## 2017-09-18 DIAGNOSIS — N39.0 URINARY TRACT INFECTION, SITE UNSPECIFIED: Primary | ICD-10-CM

## 2017-09-18 RX ORDER — LEVOFLOXACIN 250 MG/1
250 TABLET ORAL DAILY
Qty: 5 TABLET | Refills: 0 | Status: SHIPPED | OUTPATIENT
Start: 2017-09-18 | End: 2017-09-23

## 2017-09-19 ENCOUNTER — TELEPHONE (OUTPATIENT)
Dept: UROLOGY | Facility: CLINIC | Age: 72
End: 2017-09-19

## 2017-09-19 NOTE — TELEPHONE ENCOUNTER
Patient called and said that Dr Ba was suppose to call her in some cream. This was to help her with UTI'S.

## 2017-09-20 ENCOUNTER — TELEPHONE (OUTPATIENT)
Dept: UROLOGY | Facility: CLINIC | Age: 72
End: 2017-09-20

## 2017-09-20 DIAGNOSIS — N95.2 ATROPHIC VAGINITIS: Primary | ICD-10-CM

## 2017-09-20 NOTE — TELEPHONE ENCOUNTER
----- Message from Umu Gallardo CMA sent at 9/19/2017 12:58 PM CDT -----  In your last note you mentioned a hormone cream for this patient, she is calling wanting to know if she can get that sent in. What would you like me to send her?

## 2017-10-04 ENCOUNTER — PROCEDURE VISIT (OUTPATIENT)
Dept: UROLOGY | Facility: CLINIC | Age: 72
End: 2017-10-04

## 2017-10-04 VITALS
SYSTOLIC BLOOD PRESSURE: 118 MMHG | WEIGHT: 129 LBS | HEIGHT: 65 IN | DIASTOLIC BLOOD PRESSURE: 68 MMHG | TEMPERATURE: 97.3 F | BODY MASS INDEX: 21.49 KG/M2

## 2017-10-04 DIAGNOSIS — N39.0 URINARY TRACT INFECTION WITHOUT HEMATURIA, SITE UNSPECIFIED: Primary | ICD-10-CM

## 2017-10-04 LAB
BILIRUB BLD-MCNC: NEGATIVE MG/DL
CLARITY, POC: CLEAR
COLOR UR: YELLOW
GLUCOSE UR STRIP-MCNC: NEGATIVE MG/DL
KETONES UR QL: NEGATIVE
LEUKOCYTE EST, POC: ABNORMAL
NITRITE UR-MCNC: NEGATIVE MG/ML
PH UR: 7 [PH] (ref 5–8)
PROT UR STRIP-MCNC: ABNORMAL MG/DL
RBC # UR STRIP: ABNORMAL /UL
SP GR UR: 1.02 (ref 1–1.03)
UROBILINOGEN UR QL: NORMAL

## 2017-10-04 PROCEDURE — 87186 SC STD MICRODIL/AGAR DIL: CPT | Performed by: UROLOGY

## 2017-10-04 PROCEDURE — 99211 OFF/OP EST MAY X REQ PHY/QHP: CPT | Performed by: UROLOGY

## 2017-10-04 PROCEDURE — 81003 URINALYSIS AUTO W/O SCOPE: CPT | Performed by: UROLOGY

## 2017-10-04 PROCEDURE — 87086 URINE CULTURE/COLONY COUNT: CPT | Performed by: UROLOGY

## 2017-10-04 PROCEDURE — 51701 INSERT BLADDER CATHETER: CPT | Performed by: UROLOGY

## 2017-10-04 PROCEDURE — 87088 URINE BACTERIA CULTURE: CPT | Performed by: UROLOGY

## 2017-10-04 NOTE — PROGRESS NOTES
"Patient of Dr. Ba is here today complaining of Burning while urinating. The patient denies any fever, nausea, or vomitting. Catheterized urine specimen obtained using sterile technique and a Female Speci-Cath Kit * FR 6.5\". UA Auto Dip ran and urine sent for C&S. The patient tolerated the procedure well with no complications.     Dr. Mcguire was in the office at the time of procedure. Patient was advised that we will call with results. Patient verbalized understanding.     reviewed  "

## 2017-10-07 LAB
BACTERIA SPEC AEROBE CULT: ABNORMAL
BACTERIA SPEC AEROBE CULT: ABNORMAL

## 2017-10-09 ENCOUNTER — TELEPHONE (OUTPATIENT)
Dept: UROLOGY | Facility: CLINIC | Age: 72
End: 2017-10-09

## 2017-10-09 DIAGNOSIS — N39.0 URINARY TRACT INFECTION WITHOUT HEMATURIA, SITE UNSPECIFIED: Primary | ICD-10-CM

## 2017-10-09 RX ORDER — LEVOFLOXACIN 250 MG/1
250 TABLET ORAL DAILY
Qty: 5 TABLET | Refills: 0 | Status: SHIPPED | OUTPATIENT
Start: 2017-10-09 | End: 2017-10-14

## 2017-10-09 NOTE — TELEPHONE ENCOUNTER
I have sent levaquin in for patient per dr. rausch and he would like to see her in two weeks. Patient has been notified.         ----- Message from Sari Nair CMA sent at 10/6/2017  4:13 PM CDT -----  Regarding: FW: check culture results.   Results are still not back, can you make sure patient is on a medication Monday?  ----- Message -----     From: Lazaro Rausch MD     Sent: 10/6/2017   6:44 AM       To: Sari Nair CMA  Subject: check culture results.                           Be sure I see the final culture results on this. Should be out after lunch today.   ----- Message -----     From: Lois Barclay MA     Sent: 10/4/2017   2:46 PM       To: Lazaro Rausch MD

## 2017-10-23 ENCOUNTER — OFFICE VISIT (OUTPATIENT)
Dept: UROLOGY | Facility: CLINIC | Age: 72
End: 2017-10-23

## 2017-10-23 VITALS — HEIGHT: 65 IN | WEIGHT: 131 LBS | BODY MASS INDEX: 21.83 KG/M2 | TEMPERATURE: 97.4 F

## 2017-10-23 DIAGNOSIS — N39.0 URINARY TRACT INFECTION WITHOUT HEMATURIA, SITE UNSPECIFIED: Primary | ICD-10-CM

## 2017-10-23 DIAGNOSIS — N95.2 ATROPHIC VAGINITIS: ICD-10-CM

## 2017-10-23 LAB
BILIRUB BLD-MCNC: NEGATIVE MG/DL
CLARITY, POC: CLEAR
COLOR UR: YELLOW
GLUCOSE UR STRIP-MCNC: NEGATIVE MG/DL
KETONES UR QL: NEGATIVE
LEUKOCYTE EST, POC: ABNORMAL
NITRITE UR-MCNC: NEGATIVE MG/ML
PH UR: 7 [PH] (ref 5–8)
PROT UR STRIP-MCNC: NEGATIVE MG/DL
RBC # UR STRIP: ABNORMAL /UL
SP GR UR: 1.02 (ref 1–1.03)
UROBILINOGEN UR QL: NORMAL

## 2017-10-23 PROCEDURE — 87086 URINE CULTURE/COLONY COUNT: CPT | Performed by: UROLOGY

## 2017-10-23 PROCEDURE — 99214 OFFICE O/P EST MOD 30 MIN: CPT | Performed by: UROLOGY

## 2017-10-23 PROCEDURE — 81003 URINALYSIS AUTO W/O SCOPE: CPT | Performed by: UROLOGY

## 2017-10-23 RX ORDER — LEVOFLOXACIN 500 MG/1
500 TABLET, FILM COATED ORAL DAILY
Qty: 7 TABLET | Refills: 0 | Status: SHIPPED | OUTPATIENT
Start: 2017-10-23 | End: 2017-10-30

## 2017-10-23 RX ORDER — NITROFURANTOIN MACROCRYSTALS 50 MG/1
50 CAPSULE ORAL NIGHTLY
Qty: 30 CAPSULE | Refills: 2 | Status: SHIPPED | OUTPATIENT
Start: 2017-10-23 | End: 2017-11-22

## 2017-10-23 NOTE — PROGRESS NOTES
Ms. Juarez is 72 y.o. female    CHIEF COMPLAINT: I am here to follow up on my uti.     HPI  He feels better since last round of antibiotics.  She is voiding with a better stream.  No vaginal discharge.  No flank pain or discomfort.  The course of this appears to be recurrent relapsing nature.    The following portions of the patient's history were reviewed and updated as appropriate: allergies, current medications, past family history, past medical history, past social history, past surgical history and problem list.    Review of Systems   Constitutional: Negative for chills and fever.   Gastrointestinal: Negative for abdominal pain, anal bleeding and blood in stool.   Genitourinary: Negative for flank pain and hematuria.         Current Outpatient Prescriptions:   •  calcium citrate-vitamin d (CITRACAL) 200-250 MG-UNIT tablet tablet, Take  by mouth Daily., Disp: , Rfl:   •  cetirizine (ZyrTEC) 10 MG tablet, Take 10 mg by mouth daily., Disp: , Rfl:   •  fluticasone (FLONASE) 50 MCG/ACT nasal spray, 2 sprays into each nostril daily. Administer 2 sprays in each nostril for each dose., Disp: , Rfl:   •  glucosamine sulfate 500 MG capsule capsule, Take  by mouth 3 (Three) Times a Day With Meals., Disp: , Rfl:   •  levoFLOXacin (LEVAQUIN) 500 MG tablet, Take 1 tablet by mouth Daily for 7 days., Disp: 7 tablet, Rfl: 0  •  levothyroxine (SYNTHROID, LEVOTHROID) 125 MCG tablet, Take 125 mcg by mouth daily., Disp: , Rfl:   •  lisinopril (PRINIVIL,ZESTRIL) 10 MG tablet, Take 10 mg by mouth daily., Disp: , Rfl:   •  LORazepam (ATIVAN) 1 MG tablet, Take 1 mg by mouth every 8 (eight) hours as needed for anxiety., Disp: , Rfl:   •  magnesium, as, gluconate (MAGONATE) 500 (27 MG) MG tablet, Take 27 mg by mouth 2 (Two) Times a Day., Disp: , Rfl:   •  meloxicam (MOBIC) 15 MG tablet, Take 1 tablet by mouth daily., Disp: , Rfl: 5  •  nitrofurantoin (MACRODANTIN) 50 MG capsule, Take 1 capsule by mouth Every Night for 30 days., Disp:  "30 capsule, Rfl: 2  •  Omega 3-6-9 Fatty Acids (OMEGA 3-6-9 COMPLEX PO), Take  by mouth., Disp: , Rfl:   •  temazepam (RESTORIL) 30 MG capsule, Take 1 capsule by mouth daily., Disp: , Rfl: 2    Past Medical History:   Diagnosis Date   • Abdominal pain    • Anticoagulant-induced hematuria    • Calculus of kidney     LEFT   • Clot hematuria    • Dysuria    • Hypothyroidism    • Urgency of urination    • UTI (urinary tract infection) with pyuria        Past Surgical History:   Procedure Laterality Date   • CHOLECYSTECTOMY     • CYSTOSCOPY  09/2002   • HERNIA REPAIR     • TONSILLECTOMY         Social History     Social History   • Marital status:      Spouse name: N/A   • Number of children: N/A   • Years of education: N/A     Social History Main Topics   • Smoking status: Former Smoker   • Smokeless tobacco: None   • Alcohol use No   • Drug use: No   • Sexual activity: Not Asked     Other Topics Concern   • None     Social History Narrative       Family History   Problem Relation Age of Onset   • Prostate cancer Father    • No Known Problems Mother        Temp 97.4 °F (36.3 °C)  Ht 65\" (165.1 cm)  Wt 131 lb (59.4 kg)  BMI 21.8 kg/m2    Physical Exam  Constitutional: The patient  is oriented to person, place, and time. They  appear well-developed and well-nourished. No distress.   Pulmonary/Chest: Effort normal.   Abdominal: Soft. The patient exhibits no distension and no mass. There is no tenderness. There is no rebound and no guarding. No hernia.   Neurological: Patient is alert and oriented to person, place, and time.   Skin: Skin is warm and dry. Not diaphoretic.   Psychiatric:  normal mood and affect.   Vitals reviewed.  Pelvic exam reveals no evidence of significant prolapse.  Urethra is adequate normally position.  She does have dry vaginal epithelium with loss of rugae.  Medical Records Summary:  I went over all of her records from [facility] and her most recent cultures here .      Assessment " and Plan  Diagnoses and all orders for this visit:    Urinary tract infection without hematuria, site unspecified  -     Urine Culture - Urine, Urine, Clean Catch  -     POC Urinalysis Dipstick, Automated  -     levoFLOXacin (LEVAQUIN) 500 MG tablet; Take 1 tablet by mouth Daily for 7 days.  -     nitrofurantoin (MACRODANTIN) 50 MG capsule; Take 1 capsule by mouth Every Night for 30 days.    Atrophic vaginitis    #1. The patient understands the recurrent urinary tract infections are often multifactorial in origin.  Discussion of optimum management in setting of no anatomic abnormalities for recurrent UTIs is completed.  Antimicrobial therapies including the risks, convenience, and rationale were explained including postcoital prophylaxis, self start therapy and daily prophylaxis are all explained.  Based upon this discussion we have elected to start daily nitrofurantoin.  I did explain that some bacteria or naturally resistant to this antibiotic but it does kill most uropathogens including Escherichia coli typically.  I explained that the attractive thickness of this antibiotic in this setting is that the resistance pattern has not changed over decades unlike some other antibiotics such as ciprofloxacin or levofloxacin.  I did explain that if she develops symptoms that are consistent with UTI while taking this antibiotic, it is possible that she will need a different antibiotic and should contact our office to arrange to be seen and have a urine culture done.  This will need to be done by an in and out cath I explained that if we were unavailable at in this setting she should see either her primary care physician or visit the [facility].  The risk of pulmonary fibrosis is explained.  Chronic cough, dyspnea, hemoptysis, or other new pulmonary symptoms should be reported right away.  The patient expresses an understanding.  Will do this for 3  months.  #2. The benefits of topical hormonal therapy are  discussed as lowering of the vaginal pH, improvement of lubrication, and thickening of the vaginal epithelium.  It is explained how the normal vaginal mileu is a deterrent for bacterial growth.  We also discussed the risks of topical hormone therapy in postmenopausal patient's including an increase in thromboembolic events that could include myocardial infarction or stroke, as well as stimulation of the glandular tissue of the breast and uterus. However, in patients with localized breast or uterine cancer that is successfully treated, the benefits probably outweigh the risk of recurrent cancer.   It is explained that the systemic absorption of hormonal therapy is considered to be very little especially after its initial use.  Based on this discussion and my recommendations the patient has decided to proceed with this being aware of the risks.      Lazaro Ba MD  10/23/17  3:23 PM      Cc: Hair Echols MD

## 2017-10-25 ENCOUNTER — TELEPHONE (OUTPATIENT)
Dept: UROLOGY | Facility: CLINIC | Age: 72
End: 2017-10-25

## 2017-10-25 LAB — BACTERIA SPEC AEROBE CULT: NORMAL

## 2017-11-13 ENCOUNTER — HOSPITAL ENCOUNTER (OUTPATIENT)
Dept: CT IMAGING | Facility: HOSPITAL | Age: 72
Discharge: HOME | End: 2017-11-13
Attending: UROLOGY | Admitting: UROLOGY

## 2017-11-13 DIAGNOSIS — N39.0 URINARY TRACT INFECTION WITHOUT HEMATURIA, SITE UNSPECIFIED: ICD-10-CM

## 2017-11-13 PROCEDURE — 82565 ASSAY OF CREATININE: CPT

## 2017-11-13 PROCEDURE — 74178 CT ABD&PLV WO CNTR FLWD CNTR: CPT

## 2017-11-13 PROCEDURE — 0 IOPAMIDOL 61 % SOLUTION: Performed by: UROLOGY

## 2017-11-13 RX ADMIN — IOPAMIDOL 100 ML: 612 INJECTION, SOLUTION INTRAVENOUS at 10:00

## 2017-11-15 ENCOUNTER — PROCEDURE VISIT (OUTPATIENT)
Dept: UROLOGY | Facility: CLINIC | Age: 72
End: 2017-11-15

## 2017-11-15 DIAGNOSIS — J01.00 ACUTE MAXILLARY SINUSITIS, RECURRENCE NOT SPECIFIED: ICD-10-CM

## 2017-11-15 DIAGNOSIS — N95.2 ATROPHIC VAGINITIS: Primary | ICD-10-CM

## 2017-11-15 DIAGNOSIS — R31.0 GROSS HEMATURIA: ICD-10-CM

## 2017-11-15 LAB
BILIRUB BLD-MCNC: NEGATIVE MG/DL
CLARITY, POC: CLEAR
COLOR UR: YELLOW
CREAT BLDA-MCNC: 1.1 MG/DL (ref 0.6–1.3)
GLUCOSE UR STRIP-MCNC: NEGATIVE MG/DL
KETONES UR QL: NEGATIVE
LEUKOCYTE EST, POC: ABNORMAL
NITRITE UR-MCNC: NEGATIVE MG/ML
PH UR: 5.5 [PH] (ref 5–8)
PROT UR STRIP-MCNC: NEGATIVE MG/DL
RBC # UR STRIP: ABNORMAL /UL
SP GR UR: 1.02 (ref 1–1.03)
UROBILINOGEN UR QL: NORMAL

## 2017-11-15 PROCEDURE — 52000 CYSTOURETHROSCOPY: CPT | Performed by: UROLOGY

## 2017-11-15 PROCEDURE — 81003 URINALYSIS AUTO W/O SCOPE: CPT | Performed by: UROLOGY

## 2017-11-15 RX ORDER — AZITHROMYCIN 250 MG/1
TABLET, FILM COATED ORAL
Qty: 6 TABLET | Refills: 0 | Status: SHIPPED | OUTPATIENT
Start: 2017-11-15 | End: 2017-11-20

## 2017-11-15 NOTE — PROGRESS NOTES
CC: I am here for the doctor to look at my bladder    Cystoscopy procedure note  Pre- operative diagnosis:  Hematuria    Post operative diagnosis:  Same    Procedure:  The patient was prepped and draped in a normal sterile fashion.  The urethra was anesthetized with 2% lidocaine jelly.  A rigid cystoscope was introduced per urethra. The 30% and 70% lenses are used.  The urethra is normal in appearance without obstruction or mass.  The bladder is without evidence of mucosal lesion.   There is no abnormality of the urothelium.  There is minimal trabeculation of the detrusor muscle.  The ureteral orifices are orthotopic in position and they efflux clear urine.    Patient tolerated the procedure well    Complications: none    Blood loss: minimal    Diagnoses and all orders for this visit:    Atrophic vaginitis  -     POC Urinalysis Dipstick, Automated        Follow up:    Routine follow up    She will continue her nitrofuratnoin. She should stop it while she takes Zithromax for a sinus infection.

## 2018-02-22 ENCOUNTER — OFFICE VISIT (OUTPATIENT)
Dept: UROLOGY | Facility: CLINIC | Age: 73
End: 2018-02-22

## 2018-02-22 VITALS — TEMPERATURE: 97.2 F | HEIGHT: 65 IN | BODY MASS INDEX: 21.66 KG/M2 | WEIGHT: 130 LBS

## 2018-02-22 DIAGNOSIS — N32.81 OAB (OVERACTIVE BLADDER): Primary | ICD-10-CM

## 2018-02-22 DIAGNOSIS — N95.2 ATROPHIC VAGINITIS: ICD-10-CM

## 2018-02-22 LAB
BILIRUB BLD-MCNC: NEGATIVE MG/DL
CLARITY, POC: CLEAR
COLOR UR: YELLOW
GLUCOSE UR STRIP-MCNC: NEGATIVE MG/DL
KETONES UR QL: NEGATIVE
LEUKOCYTE EST, POC: ABNORMAL
NITRITE UR-MCNC: NEGATIVE MG/ML
PH UR: 7.5 [PH] (ref 5–8)
PROT UR STRIP-MCNC: NEGATIVE MG/DL
RBC # UR STRIP: ABNORMAL /UL
SP GR UR: 1.02 (ref 1–1.03)
UROBILINOGEN UR QL: NORMAL

## 2018-02-22 PROCEDURE — 99213 OFFICE O/P EST LOW 20 MIN: CPT | Performed by: UROLOGY

## 2018-02-22 PROCEDURE — 81003 URINALYSIS AUTO W/O SCOPE: CPT | Performed by: UROLOGY

## 2018-02-22 NOTE — PROGRESS NOTES
Ms. Juarez is 72 y.o. female    CHIEF COMPLAINT: I am here to follow up on my atrophic vaginitis.     HPI  This patient presents today with overactive bladder and atrophic vaginitis.  She was diagnosed with this about 3 years ago.  She had progressive symptoms.  We are now have her on topical hormone replacement which she uses once daily on the medial thigh or underarm.  She feels significantly better.  She has had no further significant increase in her urgency and frequency.  She says the symptoms are mild enough that she does not want to start an overactive bladder drug like we had discussed.  She denies any blood in the urine.  There is been no vaginal bleeding.  She has not had hysterectomy so I told her if she had vaginal spotting that she needs to contact us immediately.    The following portions of the patient's history were reviewed and updated as appropriate: allergies, current medications, past family history, past medical history, past social history, past surgical history and problem list.    Review of Systems   Constitutional: Negative for chills and fever.   Gastrointestinal: Negative for abdominal pain, anal bleeding and blood in stool.   Genitourinary: Negative for flank pain and hematuria.         Current Outpatient Prescriptions:   •  calcium citrate-vitamin d (CITRACAL) 200-250 MG-UNIT tablet tablet, Take  by mouth Daily., Disp: , Rfl:   •  cetirizine (ZyrTEC) 10 MG tablet, Take 10 mg by mouth daily., Disp: , Rfl:   •  fluticasone (FLONASE) 50 MCG/ACT nasal spray, 2 sprays into each nostril daily. Administer 2 sprays in each nostril for each dose., Disp: , Rfl:   •  glucosamine sulfate 500 MG capsule capsule, Take  by mouth 3 (Three) Times a Day With Meals., Disp: , Rfl:   •  levothyroxine (SYNTHROID, LEVOTHROID) 125 MCG tablet, Take 125 mcg by mouth daily., Disp: , Rfl:   •  lisinopril (PRINIVIL,ZESTRIL) 10 MG tablet, Take 10 mg by mouth daily., Disp: , Rfl:   •  LORazepam (ATIVAN) 1 MG tablet,  "Take 1 mg by mouth every 8 (eight) hours as needed for anxiety., Disp: , Rfl:   •  magnesium, as, gluconate (MAGONATE) 500 (27 MG) MG tablet, Take 27 mg by mouth 2 (Two) Times a Day., Disp: , Rfl:   •  meloxicam (MOBIC) 15 MG tablet, Take 1 tablet by mouth daily., Disp: , Rfl: 5  •  Omega 3-6-9 Fatty Acids (OMEGA 3-6-9 COMPLEX PO), Take  by mouth., Disp: , Rfl:   •  temazepam (RESTORIL) 30 MG capsule, Take 1 capsule by mouth daily., Disp: , Rfl: 2    Past Medical History:   Diagnosis Date   • Abdominal pain    • Anticoagulant-induced hematuria    • Calculus of kidney     LEFT   • Clot hematuria    • Dysuria    • Hypothyroidism    • Urgency of urination    • UTI (urinary tract infection) with pyuria        Past Surgical History:   Procedure Laterality Date   • CHOLECYSTECTOMY     • CYSTOSCOPY  09/2002   • HERNIA REPAIR     • TONSILLECTOMY         Social History     Social History   • Marital status:      Spouse name: N/A   • Number of children: N/A   • Years of education: N/A     Social History Main Topics   • Smoking status: Former Smoker   • Smokeless tobacco: Never Used   • Alcohol use No   • Drug use: No   • Sexual activity: Not Asked     Other Topics Concern   • None     Social History Narrative       Family History   Problem Relation Age of Onset   • Prostate cancer Father    • No Known Problems Mother        Temp 97.2 °F (36.2 °C)  Ht 165.1 cm (65\")  Wt 59 kg (130 lb)  BMI 21.63 kg/m2    Physical Exam  Alert and oriented ×3  Not agitated or distressed  No obvious deformities  No respiratory distress  Skin without pallor or diaphoresis      Results for orders placed or performed in visit on 02/22/18   POC Urinalysis Dipstick, Automated   Result Value Ref Range    Color Yellow Yellow, Straw, Dark Yellow, Arlene    Clarity, UA Clear Clear    Glucose, UA Negative Negative, 1000 mg/dL (3+) mg/dL    Bilirubin Negative Negative    Ketones, UA Negative Negative    Specific Gravity  1.020 1.005 - 1.030    " Blood, UA Trace (A) Negative    pH, Urine 7.5 5.0 - 8.0    Protein, POC Negative Negative mg/dL    Urobilinogen, UA Normal Normal    Leukocytes Small (1+) (A) Negative    Nitrite, UA Negative Negative     Microscopic Urinalysis  I inspected the urine myself based on the clinical situation including the dipstick urine. The urine is spun in a centrifuge for three minutes. The spun urine shows 0-2 rbc/hpf, 0-2 wbc/hpf, 0-2 epi/hpf, negative bacteria, trace crystals, and negative casts.       Assessment and Plan  Diagnoses and all orders for this visit:    OAB (overactive bladder)    Atrophic vaginitis  -     POC Urinalysis Dipstick, Automated    #1. The patient's symptoms are most consistent with overactive bladder.  It is recommended that the patient watch caffeine and alcohol intake, overall fluid intake, and avoid spicy foods.  Pelvic floor exercises are explained to the patient including relaxation techniques for controlling leakage as well as interrupting involuntary contractions of the bladder.  The patient is urged to maximize bowel function with strategies to avoid constipation being discussed.  A voiding diary is recommended as a means of biofeedback and further assessment.  Possible pharmaceutical options including anticholinergic, beta-3 agonist, and use of imipramine are discussed.  Her goal is to avoid medication and improve her lifestyle changes.  #2.Continue BIEST  Cream daily. Again reiterated the risk of thromboembolic risks and myocardial infarction. We both agree that benefit outweighs risk . She is a non-smoker with normal weight.     Lazaro Ba MD  02/22/18  9:42 AM      Cc: Hair Echols MD

## 2018-05-02 ENCOUNTER — PROCEDURE VISIT (OUTPATIENT)
Dept: UROLOGY | Facility: CLINIC | Age: 73
End: 2018-05-02

## 2018-05-02 VITALS — TEMPERATURE: 96.7 F | HEIGHT: 66 IN | WEIGHT: 130 LBS | BODY MASS INDEX: 20.89 KG/M2

## 2018-05-02 DIAGNOSIS — R30.0 DYSURIA: Primary | ICD-10-CM

## 2018-05-02 DIAGNOSIS — N39.0 URINARY TRACT INFECTION WITHOUT HEMATURIA, SITE UNSPECIFIED: ICD-10-CM

## 2018-05-02 LAB
BILIRUB BLD-MCNC: NEGATIVE MG/DL
CLARITY, POC: CLEAR
COLOR UR: YELLOW
GLUCOSE UR STRIP-MCNC: NEGATIVE MG/DL
KETONES UR QL: NEGATIVE
LEUKOCYTE EST, POC: ABNORMAL
NITRITE UR-MCNC: NEGATIVE MG/ML
PH UR: 6.5 [PH] (ref 5–8)
PROT UR STRIP-MCNC: NEGATIVE MG/DL
RBC # UR STRIP: ABNORMAL /UL
SP GR UR: 1.2 (ref 1–1.03)
UROBILINOGEN UR QL: NORMAL

## 2018-05-02 PROCEDURE — 87086 URINE CULTURE/COLONY COUNT: CPT | Performed by: NURSE PRACTITIONER

## 2018-05-02 PROCEDURE — 81001 URINALYSIS AUTO W/SCOPE: CPT | Performed by: NURSE PRACTITIONER

## 2018-05-02 PROCEDURE — 51798 US URINE CAPACITY MEASURE: CPT | Performed by: NURSE PRACTITIONER

## 2018-05-02 PROCEDURE — 87186 SC STD MICRODIL/AGAR DIL: CPT | Performed by: NURSE PRACTITIONER

## 2018-05-02 PROCEDURE — 87088 URINE BACTERIA CULTURE: CPT | Performed by: NURSE PRACTITIONER

## 2018-05-02 PROCEDURE — 99213 OFFICE O/P EST LOW 20 MIN: CPT | Performed by: NURSE PRACTITIONER

## 2018-05-02 NOTE — PROGRESS NOTES
Subjective    Ms. Juarez is 72 y.o. female    Chief Complaint: Frequency and Dysuria    History of Present Illness     Lower Urinary tract symptoms  Patient is here for lower urinary tract symptoms. The patient is bothered by dysuria, frequency, suprapubic pain, but is without sense of residual urine, hematuria.  Onset has been sudden.  Symptoms have been present for 3 days.  Severity is described as Mild.  Course has been unchanged. The patient perceives the amount voided is normal for the urge. Previous  history includes culture at pcp.  Previous treatment includes antibiotics.    The following portions of the patient's history were reviewed and updated as appropriate: allergies, current medications, past family history, past medical history, past social history, past surgical history and problem list.    Review of Systems   Constitutional: Negative for chills and fever.   Gastrointestinal: Negative for abdominal pain, anal bleeding, blood in stool, nausea and vomiting.   Genitourinary: Positive for dysuria, frequency, pelvic pain, urgency and vaginal pain. Negative for decreased urine volume, difficulty urinating, dyspareunia, enuresis, flank pain, genital sores, hematuria, menstrual problem, vaginal bleeding and vaginal discharge.         Current Outpatient Prescriptions:   •  calcium citrate-vitamin d (CITRACAL) 200-250 MG-UNIT tablet tablet, Take  by mouth Daily., Disp: , Rfl:   •  cetirizine (ZyrTEC) 10 MG tablet, Take 10 mg by mouth daily., Disp: , Rfl:   •  fluticasone (FLONASE) 50 MCG/ACT nasal spray, 2 sprays into each nostril daily. Administer 2 sprays in each nostril for each dose., Disp: , Rfl:   •  glucosamine sulfate 500 MG capsule capsule, Take  by mouth 3 (Three) Times a Day With Meals., Disp: , Rfl:   •  levothyroxine (SYNTHROID, LEVOTHROID) 125 MCG tablet, Take 125 mcg by mouth daily., Disp: , Rfl:   •  lisinopril (PRINIVIL,ZESTRIL) 10 MG tablet, Take 10 mg by mouth daily., Disp: , Rfl:   •   "LORazepam (ATIVAN) 1 MG tablet, Take 1 mg by mouth every 8 (eight) hours as needed for anxiety., Disp: , Rfl:   •  magnesium, as, gluconate (MAGONATE) 500 (27 MG) MG tablet, Take 27 mg by mouth 2 (Two) Times a Day., Disp: , Rfl:   •  meloxicam (MOBIC) 15 MG tablet, Take 1 tablet by mouth daily., Disp: , Rfl: 5  •  Omega 3-6-9 Fatty Acids (OMEGA 3-6-9 COMPLEX PO), Take  by mouth., Disp: , Rfl:   •  temazepam (RESTORIL) 30 MG capsule, Take 1 capsule by mouth daily., Disp: , Rfl: 2    Past Medical History:   Diagnosis Date   • Abdominal pain    • Anticoagulant-induced hematuria    • Calculus of kidney     LEFT   • Clot hematuria    • Dysuria    • Hypothyroidism    • Urgency of urination    • UTI (urinary tract infection) with pyuria        Past Surgical History:   Procedure Laterality Date   • CHOLECYSTECTOMY     • CYSTOSCOPY  09/2002   • HERNIA REPAIR     • TONSILLECTOMY         Social History     Social History   • Marital status:      Social History Main Topics   • Smoking status: Former Smoker   • Smokeless tobacco: Never Used   • Alcohol use No   • Drug use: No     Other Topics Concern   • Not on file       Family History   Problem Relation Age of Onset   • Prostate cancer Father    • No Known Problems Mother        Objective    Temp 96.7 °F (35.9 °C)   Ht 167.6 cm (66\")   Wt 59 kg (130 lb)   BMI 20.98 kg/m²     Physical Exam   Constitutional: She is oriented to person, place, and time. She appears well-developed and well-nourished.   Pulmonary/Chest: Effort normal.   Abdominal: Soft. She exhibits no distension and no mass. There is no tenderness. There is no rebound and no guarding. No hernia.   Genitourinary: Pelvic exam was performed with patient supine. There is no rash, tenderness, lesion or injury on the right labia. There is no rash, tenderness, lesion or injury on the left labia.   Neurological: She is alert and oriented to person, place, and time.   Skin: Skin is warm and dry.   Psychiatric: She " has a normal mood and affect.   Vitals reviewed.          Results for orders placed or performed in visit on 05/02/18   POC Urinalysis Dipstick, Automated   Result Value Ref Range    Color Yellow Yellow, Straw, Dark Yellow, Arlene    Clarity, UA Clear Clear    Glucose, UA Negative Negative, 1000 mg/dL (3+) mg/dL    Bilirubin Negative Negative    Ketones, UA Negative Negative    Specific Gravity  1.200 (A) 1.005 - 1.030    Blood, UA Small (A) Negative    pH, Urine 6.5 5.0 - 8.0    Protein, POC Negative Negative mg/dL    Urobilinogen, UA Normal Normal    Leukocytes Small (1+) (A) Negative    Nitrite, UA Negative Negative     Estimation of residual urine via abdominal ultrasound  Residual Urine: 58 ml  Indication: urgency  Position: Supine  Examination: Incremental scanning of the suprapubic area using 3 MHz transducer using copious amounts of acoustic gel.   Findings: An anechoic area was demonstrated which represented the bladder, with measurement of residual urine as noted. I inspected this myself. In that the residual urine was stable or insignificant, no treatment will be necessary at this time.       Assessment and Plan    Diagnoses and all orders for this visit:    Dysuria  -     Cancel: Urine Culture - Urine, Urine, Catheter In/Out  -     Urine Culture - Urine, Urine, Catheter    Urinary tract infection without hematuria, site unspecified  -     POC Urinalysis Dipstick, Automated          She has a history of UTI.  She was seen by pcp last month and took 1 round of cipro.  She has been off antibiotics for 3-4 weeks and symptoms have returned.  Current symptoms have been present for 3 days.  Will send urine for culture for further evaluation

## 2018-05-04 ENCOUNTER — TELEPHONE (OUTPATIENT)
Dept: UROLOGY | Facility: CLINIC | Age: 73
End: 2018-05-04

## 2018-05-04 NOTE — TELEPHONE ENCOUNTER
I called in macrodantin per . Patient notified    ----- Message from Gianna Brothersr sent at 5/4/2018 11:24 AM CDT -----  Regarding: urine culture results  Patient called to get her urine culture results and we just have the preliminary result. She said she would like some medication because she is still having the burning when she urinates.

## 2018-05-05 LAB
BACTERIA SPEC AEROBE CULT: ABNORMAL
BACTERIA SPEC AEROBE CULT: ABNORMAL

## 2018-05-08 ENCOUNTER — TELEPHONE (OUTPATIENT)
Dept: UROLOGY | Facility: CLINIC | Age: 73
End: 2018-05-08

## 2018-05-08 NOTE — TELEPHONE ENCOUNTER
Called pt back and let her know that she needed to finish the antiboitic.  She stated that she isnt feeling any better. She said she would call back in a few days if not better

## 2018-05-08 NOTE — TELEPHONE ENCOUNTER
Patient called today wanting to know the final results of her urine culture from last week. She started the Macrodantin but states she is not having improvement.

## 2018-05-29 ENCOUNTER — TRANSCRIBE ORDERS (OUTPATIENT)
Dept: ADMINISTRATIVE | Facility: HOSPITAL | Age: 73
End: 2018-05-29

## 2018-05-29 DIAGNOSIS — Z78.0 POSTMENOPAUSAL STATUS: Primary | ICD-10-CM

## 2018-05-29 DIAGNOSIS — Z12.31 ENCOUNTER FOR SCREENING MAMMOGRAM FOR MALIGNANT NEOPLASM OF BREAST: ICD-10-CM

## 2018-05-30 NOTE — PROGRESS NOTES
Ms. Juarez is 72 y.o. female    CHIEF COMPLAINT: I am here for recurrent UTI.    HPI  She is developed recurrent voiding symptoms.  She complains of suprapubic pain and dysuria.  Moderate in intensity.  She has had 2 such episodes over the last 3 months.  Initially gets better than antibiotics and they recur.    The following portions of the patient's history were reviewed and updated as appropriate: allergies, current medications, past family history, past medical history, past social history, past surgical history and problem list.      Review of Systems   Constitutional: Negative for chills and fever.   Gastrointestinal: Negative for abdominal pain, anal bleeding and blood in stool.   Genitourinary: Negative for flank pain, frequency, hematuria and urgency.           Current Outpatient Prescriptions:   •  calcium citrate-vitamin d (CITRACAL) 200-250 MG-UNIT tablet tablet, Take  by mouth Daily., Disp: , Rfl:   •  cetirizine (ZyrTEC) 10 MG tablet, Take 10 mg by mouth daily., Disp: , Rfl:   •  fluticasone (FLONASE) 50 MCG/ACT nasal spray, 2 sprays into each nostril daily. Administer 2 sprays in each nostril for each dose., Disp: , Rfl:   •  glucosamine sulfate 500 MG capsule capsule, Take  by mouth 3 (Three) Times a Day With Meals., Disp: , Rfl:   •  levothyroxine (SYNTHROID, LEVOTHROID) 125 MCG tablet, Take 125 mcg by mouth daily., Disp: , Rfl:   •  lisinopril (PRINIVIL,ZESTRIL) 10 MG tablet, Take 10 mg by mouth daily., Disp: , Rfl:   •  LORazepam (ATIVAN) 1 MG tablet, Take 1 mg by mouth every 8 (eight) hours as needed for anxiety., Disp: , Rfl:   •  Omega 3-6-9 Fatty Acids (OMEGA 3-6-9 COMPLEX PO), Take  by mouth., Disp: , Rfl:   •  temazepam (RESTORIL) 30 MG capsule, Take 1 capsule by mouth daily., Disp: , Rfl: 2  •  magnesium, as, gluconate (MAGONATE) 500 (27 MG) MG tablet, Take 27 mg by mouth 2 (Two) Times a Day., Disp: , Rfl:   •  meloxicam (MOBIC) 15 MG tablet, Take 1 tablet by mouth daily., Disp: , Rfl:  "5  •  nitrofurantoin (MACRODANTIN) 50 MG capsule, Take 1 capsule by mouth Every Night for 30 days., Disp: 30 capsule, Rfl: 2    Past Medical History:   Diagnosis Date   • Abdominal pain    • Anticoagulant-induced hematuria    • Calculus of kidney     LEFT   • Clot hematuria    • Dysuria    • Hypothyroidism    • Urgency of urination    • UTI (urinary tract infection) with pyuria        Past Surgical History:   Procedure Laterality Date   • CHOLECYSTECTOMY     • CYSTOSCOPY  09/2002   • HERNIA REPAIR     • TONSILLECTOMY         Social History     Social History   • Marital status:      Social History Main Topics   • Smoking status: Former Smoker   • Smokeless tobacco: Never Used   • Alcohol use No   • Drug use: No     Other Topics Concern   • Not on file       Family History   Problem Relation Age of Onset   • Prostate cancer Father    • No Known Problems Mother          /62   Temp 98.1 °F (36.7 °C)   Ht 165.1 cm (65\")   Wt 59.6 kg (131 lb 6.4 oz)   BMI 21.87 kg/m²       Physical Exam  Alert and oriented ×3  Not agitated or distressed  No obvious deformities  No respiratory distress  Skin without pallor or diaphoresis      Data  Results for orders placed or performed in visit on 05/31/18   POC Urinalysis Dipstick, Automated   Result Value Ref Range    Color Yellow Yellow, Straw, Dark Yellow, Arlene    Clarity, UA Clear Clear    Specific Gravity  1.015 1.005 - 1.030    pH, Urine 6.5 5.0 - 8.0    Leukocytes Small (1+) (A) Negative    Nitrite, UA Negative Negative    Protein, POC Negative Negative mg/dL    Glucose, UA Negative Negative, 1000 mg/dL (3+) mg/dL    Ketones, UA Negative Negative    Urobilinogen, UA Normal Normal    Bilirubin Negative Negative    Blood, UA Large (A) Negative     Microscopic Urinalysis  I inspected the urine myself based on the clinical situation including the dipstick urine. The urine is spun in a centrifuge for three minutes. The spun urine shows 3-6 rbc/hpf, none wbc/hpf, none " epi/hpf, negative bacteria, negative crystals, and negative casts.     Procedure note for in and out catheterization  She is placed in the dorsal lithotomy position.  She is carefully prepped with Betadine around the urethra.  A pediatric catheterization kit is used which contains an approximate 5 Divehi catheter.this is introduced into the urinary bladder.  Approximately 30 mL of urine is obtained and sent to the lab for culture and sensitivity.      Assessment and Plan  Diagnoses and all orders for this visit:    Recurrent cystitis  -     POC Urinalysis Dipstick, Automated  -     nitrofurantoin (MACRODANTIN) 50 MG capsule; Take 1 capsule by mouth Every Night for 30 days.  -     Urine Culture - , Urine, Catheter In/Out    Atrophic vaginitis        -Continue topical hormone therapy  - Resume daily Nitrofurantoin  -Call and get culture results.     Lazaro Ba MD  05/31/18  12:29 PM      Cc: Hair Echols MD

## 2018-05-31 ENCOUNTER — HOSPITAL ENCOUNTER (OUTPATIENT)
Dept: BONE DENSITY | Facility: HOSPITAL | Age: 73
Discharge: HOME | End: 2018-05-31

## 2018-05-31 ENCOUNTER — HOSPITAL ENCOUNTER (OUTPATIENT)
Dept: MAMMOGRAPHY | Facility: HOSPITAL | Age: 73
Discharge: HOME | End: 2018-05-31
Admitting: NURSE PRACTITIONER

## 2018-05-31 ENCOUNTER — OFFICE VISIT (OUTPATIENT)
Dept: UROLOGY | Facility: CLINIC | Age: 73
End: 2018-05-31

## 2018-05-31 VITALS
HEIGHT: 65 IN | SYSTOLIC BLOOD PRESSURE: 122 MMHG | DIASTOLIC BLOOD PRESSURE: 62 MMHG | TEMPERATURE: 98.1 F | WEIGHT: 131.4 LBS | BODY MASS INDEX: 21.89 KG/M2

## 2018-05-31 DIAGNOSIS — Z12.31 ENCOUNTER FOR SCREENING MAMMOGRAM FOR MALIGNANT NEOPLASM OF BREAST: ICD-10-CM

## 2018-05-31 DIAGNOSIS — N30.90 RECURRENT CYSTITIS: Primary | ICD-10-CM

## 2018-05-31 DIAGNOSIS — N95.2 ATROPHIC VAGINITIS: ICD-10-CM

## 2018-05-31 DIAGNOSIS — Z78.0 POSTMENOPAUSAL STATUS: ICD-10-CM

## 2018-05-31 LAB
BILIRUB BLD-MCNC: NEGATIVE MG/DL
CLARITY, POC: CLEAR
COLOR UR: YELLOW
GLUCOSE UR STRIP-MCNC: NEGATIVE MG/DL
KETONES UR QL: NEGATIVE
LEUKOCYTE EST, POC: ABNORMAL
NITRITE UR-MCNC: NEGATIVE MG/ML
PH UR: 6.5 [PH] (ref 5–8)
PROT UR STRIP-MCNC: NEGATIVE MG/DL
RBC # UR STRIP: ABNORMAL /UL
SP GR UR: 1.01 (ref 1–1.03)
UROBILINOGEN UR QL: NORMAL

## 2018-05-31 PROCEDURE — 77080 DXA BONE DENSITY AXIAL: CPT

## 2018-05-31 PROCEDURE — 81003 URINALYSIS AUTO W/O SCOPE: CPT | Performed by: UROLOGY

## 2018-05-31 PROCEDURE — 99214 OFFICE O/P EST MOD 30 MIN: CPT | Performed by: UROLOGY

## 2018-05-31 PROCEDURE — 87086 URINE CULTURE/COLONY COUNT: CPT | Performed by: UROLOGY

## 2018-05-31 PROCEDURE — 77063 BREAST TOMOSYNTHESIS BI: CPT

## 2018-05-31 PROCEDURE — 77067 SCR MAMMO BI INCL CAD: CPT

## 2018-05-31 PROCEDURE — 51701 INSERT BLADDER CATHETER: CPT | Performed by: UROLOGY

## 2018-05-31 RX ORDER — NITROFURANTOIN MACROCRYSTALS 50 MG/1
50 CAPSULE ORAL NIGHTLY
Qty: 30 CAPSULE | Refills: 2 | Status: SHIPPED | OUTPATIENT
Start: 2018-05-31 | End: 2018-06-30

## 2018-06-01 ENCOUNTER — TRANSCRIBE ORDERS (OUTPATIENT)
Dept: ADMINISTRATIVE | Facility: HOSPITAL | Age: 73
End: 2018-06-01

## 2018-06-01 DIAGNOSIS — R92.2 BREAST DENSITY: ICD-10-CM

## 2018-06-01 DIAGNOSIS — N63.0 LUMP OR MASS IN BREAST: Primary | ICD-10-CM

## 2018-06-02 LAB — BACTERIA SPEC AEROBE CULT: NORMAL

## 2018-06-04 ENCOUNTER — TELEPHONE (OUTPATIENT)
Dept: UROLOGY | Facility: CLINIC | Age: 73
End: 2018-06-04

## 2018-06-04 NOTE — TELEPHONE ENCOUNTER
Called pt to let her know that her culture was negative and she didn't answer and couldn't leave her a message.

## 2018-06-13 ENCOUNTER — HOSPITAL ENCOUNTER (OUTPATIENT)
Dept: ULTRASOUND IMAGING | Facility: HOSPITAL | Age: 73
Discharge: HOME | End: 2018-06-13

## 2018-06-13 ENCOUNTER — LAB (OUTPATIENT)
Dept: LAB | Facility: HOSPITAL | Age: 73
End: 2018-06-13

## 2018-06-13 ENCOUNTER — HOSPITAL ENCOUNTER (OUTPATIENT)
Dept: GENERAL RADIOLOGY | Facility: HOSPITAL | Age: 73
Discharge: HOME | End: 2018-06-13

## 2018-06-13 ENCOUNTER — TRANSCRIBE ORDERS (OUTPATIENT)
Dept: GENERAL RADIOLOGY | Facility: HOSPITAL | Age: 73
End: 2018-06-13

## 2018-06-13 ENCOUNTER — HOSPITAL ENCOUNTER (OUTPATIENT)
Dept: MAMMOGRAPHY | Facility: HOSPITAL | Age: 73
Discharge: HOME | End: 2018-06-13
Admitting: NURSE PRACTITIONER

## 2018-06-13 DIAGNOSIS — M79.605 LEFT LEG PAIN: ICD-10-CM

## 2018-06-13 DIAGNOSIS — M79.604 RIGHT LEG PAIN: ICD-10-CM

## 2018-06-13 DIAGNOSIS — I73.9 PERIPHERAL VASCULAR DISEASE (HCC): ICD-10-CM

## 2018-06-13 DIAGNOSIS — N63.0 LUMP OR MASS IN BREAST: ICD-10-CM

## 2018-06-13 DIAGNOSIS — R92.2 BREAST DENSITY: ICD-10-CM

## 2018-06-13 DIAGNOSIS — N81.10 CYSTOCELE, UNSPECIFIED (CODE): ICD-10-CM

## 2018-06-13 DIAGNOSIS — I73.9 PERIPHERAL VASCULAR DISEASE (HCC): Primary | ICD-10-CM

## 2018-06-13 LAB
ANION GAP SERPL CALCULATED.3IONS-SCNC: 7 MMOL/L (ref 4–13)
BUN BLD-MCNC: 17 MG/DL (ref 5–21)
BUN/CREAT SERPL: 18.7
CALCIUM SPEC-SCNC: 9.2 MG/DL (ref 8.4–10.4)
CHLORIDE SERPL-SCNC: 107 MMOL/L (ref 98–110)
CO2 SERPL-SCNC: 27 MMOL/L (ref 24–31)
CREAT BLD-MCNC: 0.91 MG/DL (ref 0.5–1.4)
GFR SERPL CREATININE-BSD FRML MDRD: 61 ML/MIN/1.73
GLUCOSE BLD-MCNC: 109 MG/DL (ref 70–100)
MAGNESIUM SERPL-MCNC: 2.1 MG/DL (ref 1.4–2.2)
POTASSIUM BLD-SCNC: 3.7 MMOL/L (ref 3.5–5.3)
SODIUM BLD-SCNC: 141 MMOL/L (ref 135–145)

## 2018-06-13 PROCEDURE — 83735 ASSAY OF MAGNESIUM: CPT | Performed by: NURSE PRACTITIONER

## 2018-06-13 PROCEDURE — 77065 DX MAMMO INCL CAD UNI: CPT

## 2018-06-13 PROCEDURE — 73564 X-RAY EXAM KNEE 4 OR MORE: CPT

## 2018-06-13 PROCEDURE — G0279 TOMOSYNTHESIS, MAMMO: HCPCS

## 2018-06-13 PROCEDURE — 72110 X-RAY EXAM L-2 SPINE 4/>VWS: CPT

## 2018-06-13 PROCEDURE — 36415 COLL VENOUS BLD VENIPUNCTURE: CPT

## 2018-06-13 PROCEDURE — 80048 BASIC METABOLIC PNL TOTAL CA: CPT

## 2018-06-13 PROCEDURE — 93970 EXTREMITY STUDY: CPT

## 2018-06-15 ENCOUNTER — TRANSCRIBE ORDERS (OUTPATIENT)
Dept: ADMINISTRATIVE | Facility: HOSPITAL | Age: 73
End: 2018-06-15

## 2018-06-15 DIAGNOSIS — I73.9 PERIPHERAL VASCULAR DISEASE (HCC): ICD-10-CM

## 2018-06-15 DIAGNOSIS — M79.604 RIGHT LEG PAIN: Primary | ICD-10-CM

## 2018-06-15 DIAGNOSIS — M79.605 LEFT LEG PAIN: ICD-10-CM

## 2018-06-19 ENCOUNTER — HOSPITAL ENCOUNTER (OUTPATIENT)
Dept: ULTRASOUND IMAGING | Facility: HOSPITAL | Age: 73
Discharge: HOME | End: 2018-06-19
Admitting: NURSE PRACTITIONER

## 2018-06-19 DIAGNOSIS — M79.605 LEFT LEG PAIN: ICD-10-CM

## 2018-06-19 DIAGNOSIS — M79.604 RIGHT LEG PAIN: ICD-10-CM

## 2018-06-19 DIAGNOSIS — I73.9 PERIPHERAL VASCULAR DISEASE (HCC): ICD-10-CM

## 2018-06-19 PROCEDURE — 93923 UPR/LXTR ART STDY 3+ LVLS: CPT

## 2018-06-19 PROCEDURE — 93925 LOWER EXTREMITY STUDY: CPT | Performed by: SURGERY

## 2018-07-24 ENCOUNTER — OFFICE VISIT (OUTPATIENT)
Dept: OBSTETRICS AND GYNECOLOGY | Facility: CLINIC | Age: 73
End: 2018-07-24

## 2018-07-24 VITALS
SYSTOLIC BLOOD PRESSURE: 148 MMHG | BODY MASS INDEX: 20.41 KG/M2 | WEIGHT: 127 LBS | DIASTOLIC BLOOD PRESSURE: 72 MMHG | HEIGHT: 66 IN

## 2018-07-24 DIAGNOSIS — N81.6 RECTOCELE: ICD-10-CM

## 2018-07-24 DIAGNOSIS — Z01.818 PRE-OP EVALUATION: ICD-10-CM

## 2018-07-24 DIAGNOSIS — Z87.891 FORMER SMOKER: ICD-10-CM

## 2018-07-24 DIAGNOSIS — N81.10 FEMALE CYSTOCELE: Primary | ICD-10-CM

## 2018-07-24 PROCEDURE — 99204 OFFICE O/P NEW MOD 45 MIN: CPT | Performed by: OBSTETRICS & GYNECOLOGY

## 2018-07-24 RX ORDER — NITROFURANTOIN 25; 75 MG/1; MG/1
CAPSULE ORAL DAILY
COMMUNITY
Start: 2018-05-16 | End: 2018-09-28

## 2018-07-24 RX ORDER — LOSARTAN POTASSIUM 50 MG/1
50 TABLET ORAL DAILY
COMMUNITY
Start: 2017-12-08 | End: 2021-08-10 | Stop reason: SDUPTHER

## 2018-07-24 NOTE — PROGRESS NOTES
"Subjective   Chief Complaint   Patient presents with   • Vaginal Prolapse     pt here today referred by Akil Marquez for prolapse. pt says she had recurrent UTI's that she says started last July. pt says she feels vaginal discomfort sometimes when up on her feet a lot. pt says she has trouble emptying her bladder. pt also says she has trouble passing stool. pt voices no other concerns.       Dorcas Juarez is a 72 y.o. year old who presents to be seen for pelvic prolapse and recurrent UTI's.  She reports that she often feels like she is \"sitting on something\", but other times it is better.  She thinks that her last GYN exam was about three years ago.    [unfilled]    The patient is not s/p hysterectomy.  She reports positive stool trapping (for several months) and recurrent UTI's since July of 2017, but denies pelvic pain and inability to have intercourse (they are not sexually active any longer).  The patient feels like the problem began years ago.  She is not currently sexually active, and is not interested in being sexually active in the future, but would be willing if her  wanted to resume that activity.  Dorcas Juarez has not had surgery for this problem in the past.    Past Medical History:   Diagnosis Date   • Abdominal pain    • Anticoagulant-induced hematuria    • Calculus of kidney     LEFT   • Clot hematuria    • Dysuria    • Hypothyroidism    • Urgency of urination    • UTI (urinary tract infection) with pyuria        Current Outpatient Prescriptions:   •  calcium citrate-vitamin d (CITRACAL) 200-250 MG-UNIT tablet tablet, Take  by mouth Daily., Disp: , Rfl:   •  cetirizine (ZyrTEC) 10 MG tablet, Take 10 mg by mouth daily., Disp: , Rfl:   •  Estradiol-Estriol-Progesterone (BIEST/PROGESTERONE) cream, , Disp: , Rfl: 5  •  fluticasone (FLONASE) 50 MCG/ACT nasal spray, 2 sprays into each nostril daily. Administer 2 sprays in each nostril for each dose., Disp: , Rfl:   •  glucosamine sulfate 500 MG " capsule capsule, Take  by mouth 3 (Three) Times a Day With Meals., Disp: , Rfl:   •  levothyroxine (SYNTHROID, LEVOTHROID) 125 MCG tablet, Take 125 mcg by mouth daily., Disp: , Rfl:   •  LORazepam (ATIVAN) 1 MG tablet, Take 1 mg by mouth every 8 (eight) hours as needed for anxiety., Disp: , Rfl:   •  losartan (COZAAR) 50 MG tablet, , Disp: , Rfl:   •  meloxicam (MOBIC) 15 MG tablet, Take 1 tablet by mouth daily., Disp: , Rfl: 5  •  Multiple Vitamins-Minerals (MULTIVITAMIN ADULT PO), , Disp: , Rfl:   •  nitrofurantoin, macrocrystal-monohydrate, (MACROBID) 100 MG capsule, , Disp: , Rfl:   •  Omega 3-6-9 Fatty Acids (OMEGA 3-6-9 COMPLEX PO), Take  by mouth., Disp: , Rfl:   •  Omega-3 Fatty Acids (OMEGA-3 FISH OIL PO), Take  by mouth., Disp: , Rfl:   •  temazepam (RESTORIL) 30 MG capsule, Take 1 capsule by mouth daily., Disp: , Rfl: 2  Family History   Problem Relation Age of Onset   • Prostate cancer Father    • No Known Problems Mother      Social History     Social History   • Marital status:      Social History Main Topics   • Smoking status: Former Smoker   • Smokeless tobacco: Never Used   • Alcohol use No   • Drug use: No     Other Topics Concern   • Not on file     Allergies   Allergen Reactions   • Bactrim [Sulfamethoxazole-Trimethoprim] Diarrhea   • Levaquin [Levofloxacin] Unknown (See Comments)     Pt cant remember reaction   • Penicillins    • Sulfa Antibiotics Itching   • Vioxx [Rofecoxib] Diarrhea       Family History   Problem Relation Age of Onset   • Prostate cancer Father    • No Known Problems Mother      Review of Systems   Constitutional: Negative for activity change and unexpected weight change.   Respiratory: Positive for shortness of breath (occasionally, with activity).    Cardiovascular: Negative for chest pain.   Gastrointestinal: Negative for abdominal pain, constipation and diarrhea.        + stool trapping   Genitourinary: Positive for difficulty urinating and vaginal pain  "(pressure/discomfort). Negative for enuresis, vaginal bleeding and vaginal discharge.   Musculoskeletal: Positive for arthralgias and back pain (low back pain).   Neurological: Positive for dizziness (occasionally) and headaches (rare sinus HA's).   Psychiatric/Behavioral: Negative for dysphoric mood. The patient is nervous/anxious.            Objective   /72   Ht 167.6 cm (66\")   Wt 57.6 kg (127 lb)   BMI 20.50 kg/m²     Physical Exam   Constitutional: She is oriented to person, place, and time. She appears well-developed and well-nourished. No distress.   HENT:   Head: Normocephalic and atraumatic.   Pulmonary/Chest: Effort normal.   Abdominal: Soft. There is no tenderness.   Genitourinary: Vagina normal and uterus normal. Pelvic exam was performed with patient supine. There is no tenderness or lesion on the right labia. There is no tenderness or lesion on the left labia. Uterus is not enlarged and not tender. Cervix exhibits no motion tenderness, no discharge and no friability. Right adnexum displays no tenderness and no fullness. Left adnexum displays no tenderness and no fullness. No tenderness or bleeding in the vagina. No signs of injury around the vagina. No vaginal discharge found.   Genitourinary Comments: Minimal descent of uterus.  Grade II cystocele, grade II rectocele.  Vaginal mucosa intact, normal.  External  normal.  No prolapse of urethral mucosa.   Neurological: She is alert and oriented to person, place, and time.   Skin: Skin is warm and dry.   Psychiatric: She has a normal mood and affect. Her behavior is normal. Judgment and thought content normal.   Nursing note and vitals reviewed.      Imaging   No data reviewed       Assessment & Plan      Dorcas was seen today for vaginal prolapse.    Diagnoses and all orders for this visit:    Female cystocele: Experiencing urinary hesitancy and incomplete emptying that results in frequent UTI's.  Offered laparoscopic hysterectomy in " combination with anterior posterior repair, but advised that she does not really need a hysterectomy at this time since she does not have significant prolapse of her uterus and cervix.  The patient is only symptomatically from the prolapse of her anterior and posterior vaginal walls, and wants to avoid any unnecessary surgical risk.  An anterior/posterior repair was described to the patient, and all of her questions answered to her satisfaction.  The patient feels that surgery is appropriate for her and wishes to proceed.  The surgical procedure, including risks and benefits, was described.  Post-op restrictions were reviewed.  All questions were answered to her satisfaction.    -     Case Request; Standing  -     ECG 12 Lead; Future  -     XR Chest 1 View; Future  -     Comprehensive metabolic panel; Future  -     cefOXitin (MEFOXIN) 2 g in sodium chloride 0.9 % 100 mL IVPB; Infuse 2 g into a venous catheter Every 8 (Eight) Hours.  -     Case Request    Pre-op evaluation  -     CBC and Differential; Future    Rectocele: Causing stool trapping.    Former smoker    Other orders  -     Outpatient In A Bed; Standing  -     Follow Anesthesia Guidelines / Standing Orders; Future  -     Follow Anesthesia Guidelines / Standing Orders; Standing  -     Place sequential compression device; Standing  -     Type & Screen; Standing  -     Obtain informed consent; Standing        Arlene Ocasio MD  7/24/2018  10:10 AM

## 2018-07-25 ENCOUNTER — TELEPHONE (OUTPATIENT)
Dept: OBSTETRICS AND GYNECOLOGY | Facility: CLINIC | Age: 73
End: 2018-07-25

## 2018-07-25 NOTE — TELEPHONE ENCOUNTER
Pt informed of surgery scheduled for 8/13/18. Pt to arrive at 530a. NPO after midnight. Labs on 7/30/18 at 115p. Pt voiced understanding.

## 2018-07-29 ENCOUNTER — RESULTS ENCOUNTER (OUTPATIENT)
Dept: OBSTETRICS AND GYNECOLOGY | Facility: CLINIC | Age: 73
End: 2018-07-29

## 2018-07-29 DIAGNOSIS — N81.10 FEMALE CYSTOCELE: ICD-10-CM

## 2018-07-29 DIAGNOSIS — Z01.818 PRE-OP EVALUATION: ICD-10-CM

## 2018-07-30 ENCOUNTER — APPOINTMENT (OUTPATIENT)
Dept: PREADMISSION TESTING | Facility: HOSPITAL | Age: 73
End: 2018-07-30

## 2018-07-30 ENCOUNTER — HOSPITAL ENCOUNTER (OUTPATIENT)
Dept: GENERAL RADIOLOGY | Facility: HOSPITAL | Age: 73
Discharge: HOME | End: 2018-07-30
Admitting: OBSTETRICS & GYNECOLOGY

## 2018-07-30 VITALS
HEART RATE: 72 BPM | SYSTOLIC BLOOD PRESSURE: 126 MMHG | BODY MASS INDEX: 23.16 KG/M2 | WEIGHT: 130.73 LBS | HEIGHT: 63 IN | DIASTOLIC BLOOD PRESSURE: 65 MMHG | RESPIRATION RATE: 18 BRPM | OXYGEN SATURATION: 96 %

## 2018-07-30 DIAGNOSIS — N81.10 FEMALE CYSTOCELE: ICD-10-CM

## 2018-07-30 LAB
ALBUMIN SERPL-MCNC: 3.8 G/DL (ref 3.5–5)
ALBUMIN/GLOB SERPL: 1.3 G/DL (ref 1.1–2.5)
ALP SERPL-CCNC: 60 U/L (ref 24–120)
ALT SERPL W P-5'-P-CCNC: 29 U/L (ref 0–54)
ANION GAP SERPL CALCULATED.3IONS-SCNC: 9 MMOL/L (ref 4–13)
AST SERPL-CCNC: 34 U/L (ref 7–45)
BASOPHILS # BLD AUTO: 0.08 10*3/MM3 (ref 0–0.2)
BASOPHILS NFR BLD AUTO: 1.1 % (ref 0–2)
BILIRUB SERPL-MCNC: 0.5 MG/DL (ref 0.1–1)
BUN BLD-MCNC: 18 MG/DL (ref 5–21)
BUN/CREAT SERPL: 18.8 (ref 7–25)
CALCIUM SPEC-SCNC: 9.7 MG/DL (ref 8.4–10.4)
CHLORIDE SERPL-SCNC: 104 MMOL/L (ref 98–110)
CO2 SERPL-SCNC: 26 MMOL/L (ref 24–31)
CREAT BLD-MCNC: 0.96 MG/DL (ref 0.5–1.4)
DEPRECATED RDW RBC AUTO: 41.6 FL (ref 40–54)
EOSINOPHIL # BLD AUTO: 0.87 10*3/MM3 (ref 0–0.7)
EOSINOPHIL NFR BLD AUTO: 12.2 % (ref 0–4)
ERYTHROCYTE [DISTWIDTH] IN BLOOD BY AUTOMATED COUNT: 12.3 % (ref 12–15)
GFR SERPL CREATININE-BSD FRML MDRD: 57 ML/MIN/1.73
GLOBULIN UR ELPH-MCNC: 3 GM/DL
GLUCOSE BLD-MCNC: 90 MG/DL (ref 70–100)
HCT VFR BLD AUTO: 33.6 % (ref 37–47)
HGB BLD-MCNC: 11.2 G/DL (ref 12–16)
IMM GRANULOCYTES # BLD: 0.01 10*3/MM3 (ref 0–0.03)
IMM GRANULOCYTES NFR BLD: 0.1 % (ref 0–5)
LYMPHOCYTES # BLD AUTO: 2.37 10*3/MM3 (ref 0.72–4.86)
LYMPHOCYTES NFR BLD AUTO: 33.2 % (ref 15–45)
MCH RBC QN AUTO: 30.9 PG (ref 28–32)
MCHC RBC AUTO-ENTMCNC: 33.3 G/DL (ref 33–36)
MCV RBC AUTO: 92.8 FL (ref 82–98)
MONOCYTES # BLD AUTO: 0.61 10*3/MM3 (ref 0.19–1.3)
MONOCYTES NFR BLD AUTO: 8.6 % (ref 4–12)
NEUTROPHILS # BLD AUTO: 3.19 10*3/MM3 (ref 1.87–8.4)
NEUTROPHILS NFR BLD AUTO: 44.8 % (ref 39–78)
NRBC BLD MANUAL-RTO: 0 /100 WBC (ref 0–0)
PLATELET # BLD AUTO: 214 10*3/MM3 (ref 130–400)
PMV BLD AUTO: 9.8 FL (ref 6–12)
POTASSIUM BLD-SCNC: 4.2 MMOL/L (ref 3.5–5.3)
PROT SERPL-MCNC: 6.8 G/DL (ref 6.3–8.7)
RBC # BLD AUTO: 3.62 10*6/MM3 (ref 4.2–5.4)
SODIUM BLD-SCNC: 139 MMOL/L (ref 135–145)
WBC NRBC COR # BLD: 7.13 10*3/MM3 (ref 4.8–10.8)

## 2018-07-30 PROCEDURE — 85025 COMPLETE CBC W/AUTO DIFF WBC: CPT | Performed by: OBSTETRICS & GYNECOLOGY

## 2018-07-30 PROCEDURE — 71045 X-RAY EXAM CHEST 1 VIEW: CPT

## 2018-07-30 PROCEDURE — 93010 ELECTROCARDIOGRAM REPORT: CPT | Performed by: INTERNAL MEDICINE

## 2018-07-30 PROCEDURE — 80053 COMPREHEN METABOLIC PANEL: CPT | Performed by: OBSTETRICS & GYNECOLOGY

## 2018-07-30 PROCEDURE — 36415 COLL VENOUS BLD VENIPUNCTURE: CPT | Performed by: OBSTETRICS & GYNECOLOGY

## 2018-07-30 PROCEDURE — 93005 ELECTROCARDIOGRAM TRACING: CPT

## 2018-08-13 ENCOUNTER — ANESTHESIA EVENT (OUTPATIENT)
Dept: PERIOP | Facility: HOSPITAL | Age: 73
End: 2018-08-13

## 2018-08-13 ENCOUNTER — HOSPITAL ENCOUNTER (OUTPATIENT)
Facility: HOSPITAL | Age: 73
Discharge: HOME | End: 2018-08-14
Attending: OBSTETRICS & GYNECOLOGY | Admitting: OBSTETRICS & GYNECOLOGY

## 2018-08-13 ENCOUNTER — ANESTHESIA (OUTPATIENT)
Dept: PERIOP | Facility: HOSPITAL | Age: 73
End: 2018-08-13

## 2018-08-13 DIAGNOSIS — N81.10 FEMALE CYSTOCELE: Primary | ICD-10-CM

## 2018-08-13 LAB
ABO GROUP BLD: NORMAL
BLD GP AB SCN SERPL QL: NEGATIVE
RH BLD: POSITIVE
T&S EXPIRATION DATE: NORMAL

## 2018-08-13 PROCEDURE — 25010000002 CEFOXITIN 2 G/11.1ML IV PUSH SYRINGE KIT (PAD): Performed by: OBSTETRICS & GYNECOLOGY

## 2018-08-13 PROCEDURE — 25010000002 FENTANYL CITRATE (PF) 250 MCG/5ML SOLUTION: Performed by: NURSE ANESTHETIST, CERTIFIED REGISTERED

## 2018-08-13 PROCEDURE — A9270 NON-COVERED ITEM OR SERVICE: HCPCS | Performed by: OBSTETRICS & GYNECOLOGY

## 2018-08-13 PROCEDURE — 25010000002 KETOROLAC TROMETHAMINE PER 15 MG: Performed by: OBSTETRICS & GYNECOLOGY

## 2018-08-13 PROCEDURE — 25010000002 MIDAZOLAM PER 1 MG: Performed by: ANESTHESIOLOGY

## 2018-08-13 PROCEDURE — A9270 NON-COVERED ITEM OR SERVICE: HCPCS | Performed by: ANESTHESIOLOGY

## 2018-08-13 PROCEDURE — 63710000001 ACETAMINOPHEN 500 MG TABLET: Performed by: ANESTHESIOLOGY

## 2018-08-13 PROCEDURE — 25010000003 CEFAZOLIN PER 500 MG: Performed by: OBSTETRICS & GYNECOLOGY

## 2018-08-13 PROCEDURE — 63710000001 CONJUGATED ESTROGENS 0.625 MG/GM CREAM 30 G TUBE: Performed by: OBSTETRICS & GYNECOLOGY

## 2018-08-13 PROCEDURE — 57260 CMBN ANT PST COLPRHY: CPT | Performed by: OBSTETRICS & GYNECOLOGY

## 2018-08-13 PROCEDURE — 86850 RBC ANTIBODY SCREEN: CPT | Performed by: OBSTETRICS & GYNECOLOGY

## 2018-08-13 PROCEDURE — 63710000001 CETIRIZINE 10 MG TABLET: Performed by: OBSTETRICS & GYNECOLOGY

## 2018-08-13 PROCEDURE — 63710000001 ROPINIROLE 1 MG TABLET: Performed by: OBSTETRICS & GYNECOLOGY

## 2018-08-13 PROCEDURE — 25810000003 SODIUM CHLORIDE 0.9 % WITH KCL 20 MEQ 20-0.9 MEQ/L-% SOLUTION: Performed by: OBSTETRICS & GYNECOLOGY

## 2018-08-13 PROCEDURE — 86901 BLOOD TYPING SEROLOGIC RH(D): CPT | Performed by: OBSTETRICS & GYNECOLOGY

## 2018-08-13 PROCEDURE — 25010000002 ONDANSETRON PER 1 MG: Performed by: NURSE ANESTHETIST, CERTIFIED REGISTERED

## 2018-08-13 PROCEDURE — 94760 N-INVAS EAR/PLS OXIMETRY 1: CPT

## 2018-08-13 PROCEDURE — 25010000002 DEXAMETHASONE PER 1 MG: Performed by: ANESTHESIOLOGY

## 2018-08-13 PROCEDURE — 25010000002 PROPOFOL 10 MG/ML EMULSION: Performed by: NURSE ANESTHETIST, CERTIFIED REGISTERED

## 2018-08-13 PROCEDURE — 86900 BLOOD TYPING SEROLOGIC ABO: CPT | Performed by: OBSTETRICS & GYNECOLOGY

## 2018-08-13 PROCEDURE — 63710000001 OXYCODONE-ACETAMINOPHEN 5-325 MG TABLET: Performed by: OBSTETRICS & GYNECOLOGY

## 2018-08-13 PROCEDURE — 25010000002 SUCCINYLCHOLINE PER 20 MG: Performed by: NURSE ANESTHETIST, CERTIFIED REGISTERED

## 2018-08-13 PROCEDURE — 63710000001 LOSARTAN 50 MG TABLET: Performed by: OBSTETRICS & GYNECOLOGY

## 2018-08-13 PROCEDURE — 94799 UNLISTED PULMONARY SVC/PX: CPT

## 2018-08-13 RX ORDER — IBUPROFEN 200 MG
400 TABLET ORAL EVERY 6 HOURS PRN
Status: DISCONTINUED | OUTPATIENT
Start: 2018-08-13 | End: 2018-08-14 | Stop reason: HOSPADM

## 2018-08-13 RX ORDER — FENTANYL CITRATE 50 UG/ML
INJECTION, SOLUTION INTRAMUSCULAR; INTRAVENOUS AS NEEDED
Status: DISCONTINUED | OUTPATIENT
Start: 2018-08-13 | End: 2018-08-13 | Stop reason: SURG

## 2018-08-13 RX ORDER — SODIUM CHLORIDE 0.9 % (FLUSH) 0.9 %
1-10 SYRINGE (ML) INJECTION AS NEEDED
Status: DISCONTINUED | OUTPATIENT
Start: 2018-08-13 | End: 2018-08-13 | Stop reason: HOSPADM

## 2018-08-13 RX ORDER — DOCUSATE SODIUM 100 MG/1
100 CAPSULE, LIQUID FILLED ORAL 2 TIMES DAILY PRN
Status: DISCONTINUED | OUTPATIENT
Start: 2018-08-13 | End: 2018-08-14 | Stop reason: HOSPADM

## 2018-08-13 RX ORDER — CALCIUM CARBONATE 200(500)MG
2 TABLET,CHEWABLE ORAL 3 TIMES DAILY PRN
Status: DISCONTINUED | OUTPATIENT
Start: 2018-08-13 | End: 2018-08-14 | Stop reason: HOSPADM

## 2018-08-13 RX ORDER — OXYCODONE HYDROCHLORIDE AND ACETAMINOPHEN 5; 325 MG/1; MG/1
1 TABLET ORAL EVERY 4 HOURS PRN
Status: DISCONTINUED | OUTPATIENT
Start: 2018-08-13 | End: 2018-08-14 | Stop reason: HOSPADM

## 2018-08-13 RX ORDER — OXYCODONE AND ACETAMINOPHEN 10; 325 MG/1; MG/1
1 TABLET ORAL ONCE AS NEEDED
Status: DISCONTINUED | OUTPATIENT
Start: 2018-08-13 | End: 2018-08-13 | Stop reason: HOSPADM

## 2018-08-13 RX ORDER — SODIUM CHLORIDE, SODIUM LACTATE, POTASSIUM CHLORIDE, CALCIUM CHLORIDE 600; 310; 30; 20 MG/100ML; MG/100ML; MG/100ML; MG/100ML
1000 INJECTION, SOLUTION INTRAVENOUS CONTINUOUS
Status: DISCONTINUED | OUTPATIENT
Start: 2018-08-13 | End: 2018-08-13 | Stop reason: HOSPADM

## 2018-08-13 RX ORDER — ONDANSETRON 2 MG/ML
4 INJECTION INTRAMUSCULAR; INTRAVENOUS ONCE AS NEEDED
Status: DISCONTINUED | OUTPATIENT
Start: 2018-08-13 | End: 2018-08-13 | Stop reason: HOSPADM

## 2018-08-13 RX ORDER — KETOROLAC TROMETHAMINE 30 MG/ML
15 INJECTION, SOLUTION INTRAMUSCULAR; INTRAVENOUS EVERY 6 HOURS PRN
Status: DISCONTINUED | OUTPATIENT
Start: 2018-08-13 | End: 2018-08-14 | Stop reason: HOSPADM

## 2018-08-13 RX ORDER — TEMAZEPAM 30 MG/1
30 CAPSULE ORAL NIGHTLY
Status: DISCONTINUED | OUTPATIENT
Start: 2018-08-13 | End: 2018-08-14 | Stop reason: HOSPADM

## 2018-08-13 RX ORDER — SODIUM CHLORIDE AND POTASSIUM CHLORIDE 150; 900 MG/100ML; MG/100ML
100 INJECTION, SOLUTION INTRAVENOUS CONTINUOUS
Status: DISCONTINUED | OUTPATIENT
Start: 2018-08-13 | End: 2018-08-14 | Stop reason: HOSPADM

## 2018-08-13 RX ORDER — NALOXONE HCL 0.4 MG/ML
0.4 VIAL (ML) INJECTION AS NEEDED
Status: DISCONTINUED | OUTPATIENT
Start: 2018-08-13 | End: 2018-08-13 | Stop reason: HOSPADM

## 2018-08-13 RX ORDER — LOSARTAN POTASSIUM 50 MG/1
50 TABLET ORAL DAILY
Status: DISCONTINUED | OUTPATIENT
Start: 2018-08-13 | End: 2018-08-14 | Stop reason: HOSPADM

## 2018-08-13 RX ORDER — ROPINIROLE 1 MG/1
1 TABLET, FILM COATED ORAL NIGHTLY
COMMUNITY
End: 2018-11-01

## 2018-08-13 RX ORDER — SIMETHICONE 80 MG
80 TABLET,CHEWABLE ORAL 4 TIMES DAILY PRN
Status: DISCONTINUED | OUTPATIENT
Start: 2018-08-13 | End: 2018-08-14 | Stop reason: HOSPADM

## 2018-08-13 RX ORDER — ROCURONIUM BROMIDE 10 MG/ML
INJECTION, SOLUTION INTRAVENOUS AS NEEDED
Status: DISCONTINUED | OUTPATIENT
Start: 2018-08-13 | End: 2018-08-13 | Stop reason: SURG

## 2018-08-13 RX ORDER — IPRATROPIUM BROMIDE AND ALBUTEROL SULFATE 2.5; .5 MG/3ML; MG/3ML
3 SOLUTION RESPIRATORY (INHALATION) ONCE AS NEEDED
Status: DISCONTINUED | OUTPATIENT
Start: 2018-08-13 | End: 2018-08-13 | Stop reason: HOSPADM

## 2018-08-13 RX ORDER — ONDANSETRON 4 MG/1
4 TABLET, FILM COATED ORAL EVERY 6 HOURS PRN
Status: DISCONTINUED | OUTPATIENT
Start: 2018-08-13 | End: 2018-08-14 | Stop reason: HOSPADM

## 2018-08-13 RX ORDER — MIDAZOLAM HYDROCHLORIDE 1 MG/ML
1 INJECTION INTRAMUSCULAR; INTRAVENOUS
Status: DISCONTINUED | OUTPATIENT
Start: 2018-08-13 | End: 2018-08-13 | Stop reason: HOSPADM

## 2018-08-13 RX ORDER — LEVOTHYROXINE SODIUM 0.07 MG/1
75 TABLET ORAL
Status: DISCONTINUED | OUTPATIENT
Start: 2018-08-13 | End: 2018-08-14 | Stop reason: HOSPADM

## 2018-08-13 RX ORDER — PROPOFOL 10 MG/ML
VIAL (ML) INTRAVENOUS AS NEEDED
Status: DISCONTINUED | OUTPATIENT
Start: 2018-08-13 | End: 2018-08-13 | Stop reason: SURG

## 2018-08-13 RX ORDER — ONDANSETRON 2 MG/ML
INJECTION INTRAMUSCULAR; INTRAVENOUS AS NEEDED
Status: DISCONTINUED | OUTPATIENT
Start: 2018-08-13 | End: 2018-08-13 | Stop reason: SURG

## 2018-08-13 RX ORDER — LIDOCAINE HYDROCHLORIDE 20 MG/ML
INJECTION, SOLUTION INFILTRATION; PERINEURAL AS NEEDED
Status: DISCONTINUED | OUTPATIENT
Start: 2018-08-13 | End: 2018-08-13 | Stop reason: SURG

## 2018-08-13 RX ORDER — DIPHENHYDRAMINE HYDROCHLORIDE 50 MG/ML
25 INJECTION INTRAMUSCULAR; INTRAVENOUS NIGHTLY PRN
Status: DISCONTINUED | OUTPATIENT
Start: 2018-08-13 | End: 2018-08-14 | Stop reason: HOSPADM

## 2018-08-13 RX ORDER — DIPHENHYDRAMINE HCL 25 MG
25 CAPSULE ORAL NIGHTLY PRN
Status: DISCONTINUED | OUTPATIENT
Start: 2018-08-13 | End: 2018-08-14 | Stop reason: HOSPADM

## 2018-08-13 RX ORDER — MEPERIDINE HYDROCHLORIDE 25 MG/ML
12.5 INJECTION INTRAMUSCULAR; INTRAVENOUS; SUBCUTANEOUS
Status: DISCONTINUED | OUTPATIENT
Start: 2018-08-13 | End: 2018-08-13 | Stop reason: HOSPADM

## 2018-08-13 RX ORDER — ROPINIROLE 1 MG/1
1 TABLET, FILM COATED ORAL NIGHTLY
Status: DISCONTINUED | OUTPATIENT
Start: 2018-08-13 | End: 2018-08-14 | Stop reason: HOSPADM

## 2018-08-13 RX ORDER — MAGNESIUM HYDROXIDE 1200 MG/15ML
LIQUID ORAL AS NEEDED
Status: DISCONTINUED | OUTPATIENT
Start: 2018-08-13 | End: 2018-08-13 | Stop reason: HOSPADM

## 2018-08-13 RX ORDER — ONDANSETRON 4 MG/1
4 TABLET, ORALLY DISINTEGRATING ORAL EVERY 6 HOURS PRN
Status: DISCONTINUED | OUTPATIENT
Start: 2018-08-13 | End: 2018-08-14 | Stop reason: HOSPADM

## 2018-08-13 RX ORDER — ACETAMINOPHEN 500 MG
1000 TABLET ORAL ONCE
Status: COMPLETED | OUTPATIENT
Start: 2018-08-13 | End: 2018-08-13

## 2018-08-13 RX ORDER — ONDANSETRON 2 MG/ML
4 INJECTION INTRAMUSCULAR; INTRAVENOUS EVERY 6 HOURS PRN
Status: DISCONTINUED | OUTPATIENT
Start: 2018-08-13 | End: 2018-08-14 | Stop reason: HOSPADM

## 2018-08-13 RX ORDER — MIDAZOLAM HYDROCHLORIDE 1 MG/ML
2 INJECTION INTRAMUSCULAR; INTRAVENOUS
Status: DISCONTINUED | OUTPATIENT
Start: 2018-08-13 | End: 2018-08-13 | Stop reason: HOSPADM

## 2018-08-13 RX ORDER — FLUTICASONE PROPIONATE 50 MCG
2 SPRAY, SUSPENSION (ML) NASAL DAILY
Status: DISCONTINUED | OUTPATIENT
Start: 2018-08-13 | End: 2018-08-14 | Stop reason: HOSPADM

## 2018-08-13 RX ORDER — FENTANYL CITRATE 50 UG/ML
25 INJECTION, SOLUTION INTRAMUSCULAR; INTRAVENOUS AS NEEDED
Status: DISCONTINUED | OUTPATIENT
Start: 2018-08-13 | End: 2018-08-13 | Stop reason: HOSPADM

## 2018-08-13 RX ORDER — LABETALOL HYDROCHLORIDE 5 MG/ML
5 INJECTION, SOLUTION INTRAVENOUS
Status: DISCONTINUED | OUTPATIENT
Start: 2018-08-13 | End: 2018-08-13 | Stop reason: HOSPADM

## 2018-08-13 RX ORDER — METOCLOPRAMIDE HYDROCHLORIDE 5 MG/ML
5 INJECTION INTRAMUSCULAR; INTRAVENOUS
Status: DISCONTINUED | OUTPATIENT
Start: 2018-08-13 | End: 2018-08-13 | Stop reason: HOSPADM

## 2018-08-13 RX ORDER — LIDOCAINE HYDROCHLORIDE AND EPINEPHRINE 10; 10 MG/ML; UG/ML
INJECTION, SOLUTION INFILTRATION; PERINEURAL AS NEEDED
Status: DISCONTINUED | OUTPATIENT
Start: 2018-08-13 | End: 2018-08-13 | Stop reason: HOSPADM

## 2018-08-13 RX ORDER — SODIUM CHLORIDE, SODIUM LACTATE, POTASSIUM CHLORIDE, CALCIUM CHLORIDE 600; 310; 30; 20 MG/100ML; MG/100ML; MG/100ML; MG/100ML
100 INJECTION, SOLUTION INTRAVENOUS CONTINUOUS
Status: DISCONTINUED | OUTPATIENT
Start: 2018-08-13 | End: 2018-08-13 | Stop reason: HOSPADM

## 2018-08-13 RX ORDER — DEXAMETHASONE SODIUM PHOSPHATE 4 MG/ML
4 INJECTION, SOLUTION INTRA-ARTICULAR; INTRALESIONAL; INTRAMUSCULAR; INTRAVENOUS; SOFT TISSUE ONCE AS NEEDED
Status: COMPLETED | OUTPATIENT
Start: 2018-08-13 | End: 2018-08-13

## 2018-08-13 RX ORDER — LORAZEPAM 0.5 MG/1
1 TABLET ORAL EVERY 8 HOURS PRN
Status: DISCONTINUED | OUTPATIENT
Start: 2018-08-13 | End: 2018-08-14 | Stop reason: HOSPADM

## 2018-08-13 RX ORDER — OXYCODONE AND ACETAMINOPHEN 10; 325 MG/1; MG/1
1 TABLET ORAL EVERY 4 HOURS PRN
Status: DISCONTINUED | OUTPATIENT
Start: 2018-08-13 | End: 2018-08-14 | Stop reason: HOSPADM

## 2018-08-13 RX ORDER — OXYCODONE AND ACETAMINOPHEN 7.5; 325 MG/1; MG/1
2 TABLET ORAL ONCE AS NEEDED
Status: DISCONTINUED | OUTPATIENT
Start: 2018-08-13 | End: 2018-08-13 | Stop reason: HOSPADM

## 2018-08-13 RX ORDER — CETIRIZINE HYDROCHLORIDE 10 MG/1
10 TABLET ORAL DAILY
Status: DISCONTINUED | OUTPATIENT
Start: 2018-08-13 | End: 2018-08-14 | Stop reason: HOSPADM

## 2018-08-13 RX ORDER — SUCCINYLCHOLINE CHLORIDE 20 MG/ML
INJECTION INTRAMUSCULAR; INTRAVENOUS AS NEEDED
Status: DISCONTINUED | OUTPATIENT
Start: 2018-08-13 | End: 2018-08-13 | Stop reason: SURG

## 2018-08-13 RX ORDER — SODIUM CHLORIDE 0.9 % (FLUSH) 0.9 %
3 SYRINGE (ML) INJECTION AS NEEDED
Status: DISCONTINUED | OUTPATIENT
Start: 2018-08-13 | End: 2018-08-13 | Stop reason: HOSPADM

## 2018-08-13 RX ORDER — IBUPROFEN 600 MG/1
600 TABLET ORAL ONCE AS NEEDED
Status: DISCONTINUED | OUTPATIENT
Start: 2018-08-13 | End: 2018-08-13 | Stop reason: HOSPADM

## 2018-08-13 RX ADMIN — FENTANYL CITRATE 100 MCG: 50 INJECTION INTRAMUSCULAR; INTRAVENOUS at 07:45

## 2018-08-13 RX ADMIN — LIDOCAINE HYDROCHLORIDE 60 MG: 20 INJECTION, SOLUTION INFILTRATION; PERINEURAL at 07:45

## 2018-08-13 RX ADMIN — MIDAZOLAM 2 MG: 1 INJECTION INTRAMUSCULAR; INTRAVENOUS at 06:46

## 2018-08-13 RX ADMIN — SUCCINYLCHOLINE CHLORIDE 60 MG: 20 INJECTION, SOLUTION INTRAMUSCULAR; INTRAVENOUS at 07:47

## 2018-08-13 RX ADMIN — LOSARTAN POTASSIUM 50 MG: 50 TABLET, FILM COATED ORAL at 16:14

## 2018-08-13 RX ADMIN — DEXAMETHASONE SODIUM PHOSPHATE 4 MG: 4 INJECTION, SOLUTION INTRA-ARTICULAR; INTRALESIONAL; INTRAMUSCULAR; INTRAVENOUS; SOFT TISSUE at 06:46

## 2018-08-13 RX ADMIN — KETOROLAC TROMETHAMINE 15 MG: 30 INJECTION, SOLUTION INTRAMUSCULAR; INTRAVENOUS at 11:14

## 2018-08-13 RX ADMIN — ROCURONIUM BROMIDE 5 MG: 10 INJECTION INTRAVENOUS at 07:45

## 2018-08-13 RX ADMIN — Medication 2 G: at 07:50

## 2018-08-13 RX ADMIN — OXYCODONE HYDROCHLORIDE AND ACETAMINOPHEN 1 TABLET: 5; 325 TABLET ORAL at 11:14

## 2018-08-13 RX ADMIN — ROPINIROLE HYDROCHLORIDE 1 MG: 1 TABLET, FILM COATED ORAL at 21:59

## 2018-08-13 RX ADMIN — KETOROLAC TROMETHAMINE 15 MG: 30 INJECTION, SOLUTION INTRAMUSCULAR; INTRAVENOUS at 18:25

## 2018-08-13 RX ADMIN — ACETAMINOPHEN 1000 MG: 500 TABLET, FILM COATED ORAL at 06:46

## 2018-08-13 RX ADMIN — EPHEDRINE SULFATE 25 MG: 50 INJECTION INTRAMUSCULAR; INTRAVENOUS; SUBCUTANEOUS at 07:55

## 2018-08-13 RX ADMIN — POTASSIUM CHLORIDE AND SODIUM CHLORIDE 100 ML/HR: 900; 150 INJECTION, SOLUTION INTRAVENOUS at 11:04

## 2018-08-13 RX ADMIN — SODIUM CHLORIDE, POTASSIUM CHLORIDE, SODIUM LACTATE AND CALCIUM CHLORIDE 1000 ML: 600; 310; 30; 20 INJECTION, SOLUTION INTRAVENOUS at 06:06

## 2018-08-13 RX ADMIN — OXYCODONE HYDROCHLORIDE AND ACETAMINOPHEN 1 TABLET: 5; 325 TABLET ORAL at 17:15

## 2018-08-13 RX ADMIN — CEFAZOLIN 2 G: 1 INJECTION, POWDER, FOR SOLUTION INTRAMUSCULAR; INTRAVENOUS at 16:14

## 2018-08-13 RX ADMIN — ONDANSETRON HYDROCHLORIDE 4 MG: 2 SOLUTION INTRAMUSCULAR; INTRAVENOUS at 08:40

## 2018-08-13 RX ADMIN — CETIRIZINE HYDROCHLORIDE 10 MG: 10 TABLET ORAL at 11:10

## 2018-08-13 RX ADMIN — OXYCODONE HYDROCHLORIDE AND ACETAMINOPHEN 1 TABLET: 5; 325 TABLET ORAL at 21:59

## 2018-08-13 RX ADMIN — FENTANYL CITRATE 150 MCG: 50 INJECTION INTRAMUSCULAR; INTRAVENOUS at 07:51

## 2018-08-13 RX ADMIN — PROPOFOL 150 MG: 10 INJECTION, EMULSION INTRAVENOUS at 07:45

## 2018-08-13 RX ADMIN — EPHEDRINE SULFATE 25 MG: 50 INJECTION INTRAMUSCULAR; INTRAVENOUS; SUBCUTANEOUS at 07:59

## 2018-08-13 NOTE — INTERVAL H&P NOTE
H&P reviewed. The patient was examined and there are no changes to the H&P.  Patient's questions about post-op expectations and her compounded hormone cream were all answered.

## 2018-08-13 NOTE — PLAN OF CARE
Problem: Patient Care Overview  Goal: Plan of Care Review  Outcome: Ongoing (interventions implemented as appropriate)   08/13/18 8158   Coping/Psychosocial   Plan of Care Reviewed With patient   Plan of Care Review   Progress improving   OTHER   Outcome Summary VSS WNL, caldwell catheter draining clear/yellow urine, packing in, taking percocet for pain, ambulating the mcclelland.     Goal: Individualization and Mutuality  Outcome: Ongoing (interventions implemented as appropriate)    Goal: Discharge Needs Assessment  Outcome: Ongoing (interventions implemented as appropriate)    Goal: Interprofessional Rounds/Family Conf  Outcome: Ongoing (interventions implemented as appropriate)      Problem: Surgery Nonspecified (Adult)  Goal: Signs and Symptoms of Listed Potential Problems Will be Absent, Minimized or Managed (Surgery Nonspecified)  Outcome: Ongoing (interventions implemented as appropriate)    Goal: Anesthesia/Sedation Recovery  Outcome: Ongoing (interventions implemented as appropriate)

## 2018-08-13 NOTE — ANESTHESIA PREPROCEDURE EVALUATION
Anesthesia Evaluation     Patient summary reviewed   no history of anesthetic complications:  NPO Solid Status: > 8 hours  NPO Liquid Status: > 8 hours           Airway   Mallampati: III  TM distance: <3 FB  Neck ROM: full  Dental - normal exam     Pulmonary - normal exam   (-) asthma, recent URI, sleep apnea, not a smoker  Cardiovascular - normal exam  Exercise tolerance: good (4-7 METS)    ECG reviewed  Rhythm: regular  Rate: normal    (+) hypertension well controlled,   (-) pacemaker, past MI, angina, cardiac stents, CABG      Neuro/Psych  (+) psychiatric history Anxiety,     (-) seizures, TIA, CVA  GI/Hepatic/Renal/Endo    (+)   hypothyroidism,   (-) GERD, liver disease, no renal disease, diabetes, hyperthyroidism    Musculoskeletal     Abdominal    Substance History      OB/GYN          Other                        Anesthesia Plan    ASA 2     general     intravenous induction   Anesthetic plan and risks discussed with patient.

## 2018-08-13 NOTE — ANESTHESIA PROCEDURE NOTES
Airway  Urgency: elective    Airway not difficult    General Information and Staff    Patient location during procedure: OR  CRNA: EMIGDIO MAC    Indications and Patient Condition  Indications for airway management: airway protection    Preoxygenated: yes  Mask difficulty assessment: 1 - vent by mask    Final Airway Details  Final airway type: endotracheal airway      Successful airway: ETT    Successful intubation technique: direct laryngoscopy  Endotracheal tube insertion site: oral  Blade: John  Blade size: 3.5.  ETT size: 7.5 mm  Cormack-Lehane Classification: grade IIa - partial view of glottis  Placement verified by: chest auscultation and capnometry   Measured from: lips  ETT to lips (cm): 21  Number of attempts at approach: 1

## 2018-08-13 NOTE — OP NOTE
BH Tiago Juarez  : 1945  MRN: [id number]  [facility]: [id number]  Date: 2018    Operative Note    ANTERIOR AND POSTERIOR VAGINAL REPAIR      Pre-op Diagnosis:  Female cystocele [N81.10]   Rectocele   Post-op Diagnosis:  Post-Op Diagnosis Codes:     * Female cystocele [N81.10]  Rectocle   Procedure: Procedure(s):  ANTERIOR AND POSTERIOR VAGINAL REPAIR   Surgeon: Surgeon(s):  Arlene Ocasio MD Williamson, Katherine, DO       Anesthesia: General   Estimated Blood Loss: 20   mls   Fluids: 2200   mls   UOP: clear   ABx: Ancef, 2 gm   Specimens:  none   Findings: Cystocele, grade III  Rectocele, grade II  Mild uterine prolapes   Complications: none     Description of Procedure:       The patient was taken to the OR, where she was prepped and draped in the usual sterile fashion in the dorsal lithotomy position using Cirilo stirrups.  The patient's bladder was drained using an in and out cath.  A Tirstan retractor was placed anteriorly to aid with visualization, and the posterior introitus was grasped with Allis clamps it 5 and 7 o'clock.  The posterior vaginal wall was injected with Pitressin in injectable saline.  A scalpel was used to make a midline incision the entire length of the posterior vaginal wall.  The edges of this incision were grasped with Allis clamps.  Metzenbaum scissors were used to dissect the excess vaginal mucosa off the underlying vaginal wall.  3-0 Vicryl pop-off sutures were used to create a midline plication and reduce the patient's posterior wall defect.  Excess vaginal mucosa was trimmed from the edges of the incision with curved Ibrahim scissors.  The resulting incision edges were reapproximated in a proximal to distal fashion using a 2-0 Vicryl swedged-on suture in a running locked fashion.       Attention was then turned to the anterior vaginal wall by placing a weighted speculum in the posterior vagina and injecting the vaginal mucosa with Pitressin in injectable saline.  A  midline surgical incision was made the entire length of the anterior vaginal wall with a scalpel and the edges of this incision grasped with Allis clamps.  Again, the excess vaginal mucosa was dissected away from the underlying vaginal wall using Metzenbaum scissors.  Midline plication was performed using 3-0 Vicryl pop-off sutures in an interrupted fashion.  Excess vaginal mucosa was trimmed from the edges of the incision using curved Ibrahim scissors.     The entire length of the anterior vaginal wall incision was then reapproximated with 2-0 Vicryl suture in a running locked fashion.       At the conclusion of the case the patient had good anterior and posterior wall support.  The caliber of the vagina allowed 2 fingers, [id number], without any narrowing or stricturing at any point along the length of the vaginal canal.  The patient's vagina was filled with Premarin vaginal cream and vaginal packing material.  Sponge lap and needle counts were correct ×2.  The patient tolerated the procedure well, and was taken to the recovery room in stable condition.      Arlene Ocasio MD   8/13/2018  8:52 AM

## 2018-08-13 NOTE — ANESTHESIA POSTPROCEDURE EVALUATION
Patient: Dorcas Juarez    Procedure Summary     Date:  08/13/18 Room / Location:  [facility] OR  /  [facility] OR    Anesthesia Start:  0745 Anesthesia Stop:  0856    Procedure:  ANTERIOR AND POSTERIOR VAGINAL REPAIR (N/A Vagina) Diagnosis:       Female cystocele      (Female cystocele [N81.10])    Surgeon:  Arlene Ocasio MD Provider:  Serafin Galloway CRNA    Anesthesia Type:  general ASA Status:  2          Anesthesia Type: general  Last vitals  BP   133/70 (08/13/18 1105)   Temp   97 °F (36.1 °C) (08/13/18 1105)   Pulse   83 (08/13/18 1105)   Resp   16 (08/13/18 1105)     SpO2   95 % (08/13/18 1105)     Post Anesthesia Care and Evaluation    Patient location during evaluation: PACU  Patient participation: complete - patient participated  Level of consciousness: awake and alert  Pain management: adequate  Airway patency: patent  Anesthetic complications: No anesthetic complications  PONV Status: controlled  Cardiovascular status: acceptable and hemodynamically stable  Respiratory status: acceptable  Hydration status: acceptable    Comments: Patient discharged from PACU prior to anesthesia evaluation based on Blaise Score.  For details, see RN note.     /70 (BP Location: Right arm, Patient Position: Lying)   Pulse 83   Temp 97 °F (36.1 °C) (Temporal Artery )   Resp 16   SpO2 95%

## 2018-08-14 VITALS
RESPIRATION RATE: 18 BRPM | OXYGEN SATURATION: 97 % | HEART RATE: 61 BPM | TEMPERATURE: 97.8 F | DIASTOLIC BLOOD PRESSURE: 62 MMHG | SYSTOLIC BLOOD PRESSURE: 130 MMHG

## 2018-08-14 PROBLEM — N81.10 FEMALE CYSTOCELE: Status: RESOLVED | Noted: 2018-08-13 | Resolved: 2018-08-14

## 2018-08-14 LAB
DEPRECATED RDW RBC AUTO: 42.9 FL (ref 40–54)
ERYTHROCYTE [DISTWIDTH] IN BLOOD BY AUTOMATED COUNT: 12.4 % (ref 12–15)
HCT VFR BLD AUTO: 35.9 % (ref 37–47)
HGB BLD-MCNC: 11.7 G/DL (ref 12–16)
MCH RBC QN AUTO: 30.9 PG (ref 28–32)
MCHC RBC AUTO-ENTMCNC: 32.6 G/DL (ref 33–36)
MCV RBC AUTO: 94.7 FL (ref 82–98)
PLATELET # BLD AUTO: 223 10*3/MM3 (ref 130–400)
PMV BLD AUTO: 9.9 FL (ref 6–12)
RBC # BLD AUTO: 3.79 10*6/MM3 (ref 4.2–5.4)
WBC NRBC COR # BLD: 13.93 10*3/MM3 (ref 4.8–10.8)

## 2018-08-14 PROCEDURE — 25010000003 CEFAZOLIN PER 500 MG: Performed by: OBSTETRICS & GYNECOLOGY

## 2018-08-14 PROCEDURE — 85027 COMPLETE CBC AUTOMATED: CPT | Performed by: OBSTETRICS & GYNECOLOGY

## 2018-08-14 PROCEDURE — 63710000001 LEVOTHYROXINE 75 MCG TABLET: Performed by: OBSTETRICS & GYNECOLOGY

## 2018-08-14 PROCEDURE — A9270 NON-COVERED ITEM OR SERVICE: HCPCS | Performed by: OBSTETRICS & GYNECOLOGY

## 2018-08-14 PROCEDURE — 63710000001 OXYCODONE-ACETAMINOPHEN 5-325 MG TABLET: Performed by: OBSTETRICS & GYNECOLOGY

## 2018-08-14 PROCEDURE — 63710000001 TEMAZEPAM 30 MG CAPSULE: Performed by: OBSTETRICS & GYNECOLOGY

## 2018-08-14 RX ORDER — OXYCODONE HYDROCHLORIDE AND ACETAMINOPHEN 5; 325 MG/1; MG/1
1-2 TABLET ORAL EVERY 4 HOURS PRN
Qty: 40 TABLET | Refills: 0 | Status: SHIPPED | OUTPATIENT
Start: 2018-08-14 | End: 2018-08-29

## 2018-08-14 RX ORDER — LACTULOSE 20 G/30ML
20 SOLUTION ORAL 2 TIMES DAILY
Qty: 946 ML | Refills: 1 | Status: SHIPPED | OUTPATIENT
Start: 2018-08-14 | End: 2018-09-28

## 2018-08-14 RX ADMIN — LEVOTHYROXINE SODIUM 75 MCG: 75 TABLET ORAL at 06:11

## 2018-08-14 RX ADMIN — TEMAZEPAM 30 MG: 30 CAPSULE ORAL at 00:21

## 2018-08-14 RX ADMIN — OXYCODONE HYDROCHLORIDE AND ACETAMINOPHEN 1 TABLET: 5; 325 TABLET ORAL at 06:11

## 2018-08-14 RX ADMIN — CEFAZOLIN 2 G: 1 INJECTION, POWDER, FOR SOLUTION INTRAMUSCULAR; INTRAVENOUS at 00:21

## 2018-08-14 NOTE — PROGRESS NOTES
Tiago Juarez  : 1945  MRN: [id number]  CSN: [id number]    Post-operative Day #1 AR/NC  Subjective   Medications being utilized for pain control: prescription NSAID's including ketorolac (Toradol) and narcotic analgesics including oxycodone/acetaminophen (Percocet, Tylox).  Patient reports pain is well-controlled.  She is passing gas and has not had a bowel movement.  Patient is having scant staining.  Her caldwell catheter +/- vaginal packing have already been removed.  She has not yet needed to void.  Patient feels ready for discharge home.       Objective     Min/max vitals past 24 hours:   Temp  Min: 97 °F (36.1 °C)  Max: 98.1 °F (36.7 °C)  BP  Min: 119/56  Max: 143/71  Pulse  Min: 59  Max: 90  Resp  Min: 12  Max: 20        I/O last 3 completed shifts:  In: 250 [I.V.:250]  Out: 1925 [Urine:1925]    General: well developed; well nourished  no acute distress   Lungs:  Abdomen: breathing is unlabored  soft, non-tender; no masses   Pelvic: Not performed   Ext: Calves NT     WBC   Date/Time Value Ref Range Status   2018 1349 7.13 4.80 - 10.80 10*3/mm3 Final     Hemoglobin   Date/Time Value Ref Range Status   2018 1349 11.2 (L) 12.0 - 16.0 g/dL Final     Hematocrit   Date/Time Value Ref Range Status   2018 1349 33.6 (L) 37.0 - 47.0 % Final     Platelets   Date/Time Value Ref Range Status   2018 1349 214 130 - 400 10*3/mm3 Final        Assessment   1. Post-op Day #1 S/P AR/NC  2. Doing well     Plan   1. Post-op restrictions reviewed  2. Discharge to home  3. D/C questions all answered  4. Follow-up appointment in 2 week(s)    Arlene Ocasio MD  2018  6:29 AM

## 2018-08-14 NOTE — DISCHARGE SUMMARY
Tiago Juarez  : 1945  MRN: [id number]  CSN: [id number]    Discharge Summary      Date of Admission: 2018   Date of Discharge: 2018    Discharge Diagnosis: 1. Cystocele  2. rectocele   Procedures Performed: Procedure(s):  ANTERIOR AND POSTERIOR VAGINAL REPAIR      Consults: none   Brief History: Patient is a 72 y.o.who presented for scheduled surgical correction.     Hospital Course: Uncomplicated.  Patient was ready for discharge on POD #1.     Pending Studies: None.     Condition at discharge: rapidly improving   Discharge Medications:    Your medication list      START taking these medications      Instructions Last Dose Given Next Dose Due   lactulose 20 GM/30ML solution solution      Take 30 mL by mouth 2 (Two) Times a Day. Titrate as needed       oxyCODONE-acetaminophen 5-325 MG per tablet  Commonly known as:  PERCOCET      Take 1-2 tablets by mouth Every 4 (Four) Hours As Needed for Moderate Pain  or Severe Pain .          CONTINUE taking these medications      Instructions Last Dose Given Next Dose Due   BIEST/PROGESTERONE cream      1 application Daily.       calcium citrate-vitamin d 200-250 MG-UNIT tablet tablet  Commonly known as:  CITRACAL      Take  by mouth Daily.       cetirizine 10 MG tablet  Commonly known as:  zyrTEC      Take 10 mg by mouth daily.       fluticasone 50 MCG/ACT nasal spray  Commonly known as:  FLONASE      2 sprays into each nostril daily. Administer 2 sprays in each nostril for each dose.       glucosamine sulfate 500 MG capsule capsule      Take  by mouth 3 (Three) Times a Day With Meals.       levothyroxine 125 MCG tablet  Commonly known as:  SYNTHROID, LEVOTHROID      Take 75 mcg by mouth Daily.       LORazepam 1 MG tablet  Commonly known as:  ATIVAN      Take 1 mg by mouth every 8 (eight) hours as needed for anxiety.       losartan 50 MG tablet  Commonly known as:  COZAAR      Take 50 mg by mouth Daily.       meloxicam 15 MG tablet  Commonly  known as:  MOBIC      Take 1 tablet by mouth daily.       MULTIVITAMIN ADULT PO      1 tablet Daily.       nitrofurantoin (macrocrystal-monohydrate) 100 MG capsule  Commonly known as:  MACROBID      Take  by mouth Daily.       OMEGA 3-6-9 COMPLEX PO      Take 1 capsule by mouth 2 (Two) Times a Day.       rOPINIRole 1 MG tablet  Commonly known as:  REQUIP      Take 1 mg by mouth Every Night. Take 1 hour before bedtime.       temazepam 30 MG capsule  Commonly known as:  RESTORIL      Take 1 capsule by mouth daily.             Where to Get Your Medications      You can get these medications from any pharmacy    Bring a paper prescription for each of these medications  · lactulose 20 GM/30ML solution solution  · oxyCODONE-acetaminophen 5-325 MG per tablet        Discharge Disposition: home   Follow-up: Future Appointments  Date Time Provider Department Center   8/29/2018 11:00 AM Arlene Ocasio MD [facility] None   9/6/2018 10:10 AM Lazaro Ba MD [facility] None            This note has been electronically signed.    Arlene Ocasio MD  August 14, 2018  6:37 AM

## 2018-08-14 NOTE — PLAN OF CARE
Problem: Patient Care Overview  Goal: Plan of Care Review  Outcome: Ongoing (interventions implemented as appropriate)   08/14/18 0136   Coping/Psychosocial   Plan of Care Reviewed With patient   Plan of Care Review   Progress improving   OTHER   Outcome Summary VSpaulette CASTILLO to be dc'd this am, vag packing to be removed this am, PO pain meds controlling pain well, good uop, ambulated in mcclelland this shift      Goal: Individualization and Mutuality  Outcome: Ongoing (interventions implemented as appropriate)    Goal: Discharge Needs Assessment  Outcome: Ongoing (interventions implemented as appropriate)    Goal: Interprofessional Rounds/Family Conf  Outcome: Ongoing (interventions implemented as appropriate)      Problem: Surgery Nonspecified (Adult)  Goal: Signs and Symptoms of Listed Potential Problems Will be Absent, Minimized or Managed (Surgery Nonspecified)  Outcome: Ongoing (interventions implemented as appropriate)    Goal: Anesthesia/Sedation Recovery  Outcome: Ongoing (interventions implemented as appropriate)

## 2018-08-23 ENCOUNTER — TELEPHONE (OUTPATIENT)
Dept: UROLOGY | Facility: CLINIC | Age: 73
End: 2018-08-23

## 2018-08-23 DIAGNOSIS — N95.2 ATROPHIC VAGINITIS: Primary | ICD-10-CM

## 2018-08-23 NOTE — TELEPHONE ENCOUNTER
Pharmacy called and they stated the prescription that was called in today for patient isn't one that they carry. This medication is usually filled at [facility] or [facility]?

## 2018-08-23 NOTE — TELEPHONE ENCOUNTER
Pts pharmacy called wanting a refill for the Biest/Progesterone. Please fax to the pharmacy the refill.

## 2018-08-29 ENCOUNTER — OFFICE VISIT (OUTPATIENT)
Dept: OBSTETRICS AND GYNECOLOGY | Facility: CLINIC | Age: 73
End: 2018-08-29

## 2018-08-29 VITALS
BODY MASS INDEX: 23.74 KG/M2 | WEIGHT: 129 LBS | DIASTOLIC BLOOD PRESSURE: 78 MMHG | SYSTOLIC BLOOD PRESSURE: 138 MMHG | HEIGHT: 62 IN

## 2018-08-29 DIAGNOSIS — N95.2 VAGINAL ATROPHY: Primary | ICD-10-CM

## 2018-08-29 DIAGNOSIS — Z09 S/P GYNECOLOGICAL SURGERY, FOLLOW-UP EXAM: ICD-10-CM

## 2018-08-29 PROCEDURE — 99024 POSTOP FOLLOW-UP VISIT: CPT | Performed by: OBSTETRICS & GYNECOLOGY

## 2018-08-29 NOTE — PROGRESS NOTES
"Subjective   Chief Complaint   Patient presents with   • Post-op     pt here today for 2 week post op anterior and posterior vaginal repair. pt says she is feeling good since surgery. pt voices no concerns.      Dorcas Juarez is a 73 y.o. year old  presenting to be seen for her post-operative visit.  She had a repair of cystocele (AR) and repair of rectocele (KS) 2 weeks ago.  Currently she reports pain for only a few days after surgery, but is not having any pain at this time.  She is not having any vaginal bleeding.  Patient not feeling like she is \"sitting on something\" anymore.  She does not think that her bladder function has improved, she still reports frequent urination although she does feel like she gets completely empty now.  She does drink coffee all morning (3-4 cups) and then drinks water.  Patient reports bowel movements pass normally.    No Additional Complaints Reported    The following portions of the patient's history were reviewed and updated as appropriate:current medications and allergies    Review of Systems   Respiratory: Negative for shortness of breath.    Cardiovascular: Negative for chest pain.   Gastrointestinal: Negative for abdominal pain.         Objective   /78   Ht 157.5 cm (62\")   Wt 58.5 kg (129 lb)   BMI 23.59 kg/m²     General:  well developed; well nourished  no acute distress   Abdomen: soft, non-tender; no masses   Pelvis: Clinical staff was present for exam  Incisions healing well.  Exam non-tender.  Vagina somewhat shortened from hysterectomy, but no more than prior to surgery          Assessment   1. Pt is 2 weeks s/p repair of cystocele (AR) and repair of rectocele (KS)  2. Doing well     Plan   1. Decrease caffeine to address frequency  2. Continue precautions  3. RTO in 4 weeks    No orders of the defined types were placed in this encounter.         This note was electronically signed.    Arlene Ocasio MD  2018  "

## 2018-09-04 NOTE — PROGRESS NOTES
Ms. Juarez is 73 y.o. female    CHIEF COMPLAINT: I am here for follow up recurrent cystitis    HPI  She returns today for recurrent cystitis that is associated atrophic vaginitis.  She has been doing much better.  This is been a prolonged issue for her for over 5 years.  She thinks she is doing the best she is done in a long time.  Much of this is attributed to use of topical estrogen replacement using Premarin cream.  She also underwent an anterior posterior repair by Dr. Ocasio. She is delighted with the outcome.  She is voiding with a good stream.    The following portions of the patient's history were reviewed and updated as appropriate: allergies, current medications, past family history, past medical history, past social history, past surgical history and problem list.      Review of Systems   Constitutional: Negative for chills and fever.   Gastrointestinal: Negative for abdominal pain, anal bleeding and blood in stool.   Genitourinary: Negative for flank pain, frequency, hematuria and urgency.           Current Outpatient Prescriptions:   •  calcium citrate-vitamin d (CITRACAL) 200-250 MG-UNIT tablet tablet, Take  by mouth Daily., Disp: , Rfl:   •  cetirizine (ZyrTEC) 10 MG tablet, Take 10 mg by mouth daily., Disp: , Rfl:   •  Estradiol-Estriol-Progesterone (BIEST/PROGESTERONE) cream, 1 application by Other route Daily., Disp: 60 g, Rfl: 5  •  fluticasone (FLONASE) 50 MCG/ACT nasal spray, 2 sprays into each nostril daily. Administer 2 sprays in each nostril for each dose., Disp: , Rfl:   •  glucosamine sulfate 500 MG capsule capsule, Take  by mouth 3 (Three) Times a Day With Meals., Disp: , Rfl:   •  lactulose 20 GM/30ML solution solution, Take 30 mL by mouth 2 (Two) Times a Day. Titrate as needed, Disp: 946 mL, Rfl: 1  •  levothyroxine (SYNTHROID, LEVOTHROID) 125 MCG tablet, Take 75 mcg by mouth Daily., Disp: , Rfl:   •  LORazepam (ATIVAN) 1 MG tablet, Take 1 mg by mouth every 8 (eight) hours as needed  for anxiety., Disp: , Rfl:   •  losartan (COZAAR) 50 MG tablet, Take 50 mg by mouth Daily., Disp: , Rfl:   •  meloxicam (MOBIC) 15 MG tablet, Take 1 tablet by mouth daily., Disp: , Rfl: 5  •  Multiple Vitamins-Minerals (MULTIVITAMIN ADULT PO), 1 tablet Daily., Disp: , Rfl:   •  nitrofurantoin, macrocrystal-monohydrate, (MACROBID) 100 MG capsule, Take  by mouth Daily., Disp: , Rfl:   •  Omega 3-6-9 Fatty Acids (OMEGA 3-6-9 COMPLEX PO), Take 1 capsule by mouth 2 (Two) Times a Day., Disp: , Rfl:   •  rOPINIRole (REQUIP) 1 MG tablet, Take 1 mg by mouth Every Night. Take 1 hour before bedtime., Disp: , Rfl:   •  temazepam (RESTORIL) 30 MG capsule, Take 1 capsule by mouth daily., Disp: , Rfl: 2  •  conjugated estrogens (PREMARIN) 0.625 MG/GM vaginal cream, Insert  into the vagina Daily., Disp: 30 g, Rfl: 2    Past Medical History:   Diagnosis Date   • Abdominal pain    • Anticoagulant-induced hematuria    • Anxiety    • Arthritis    • Calculus of kidney     LEFT   • Clot hematuria    • Cystocele with rectocele    • Dysuria    • Hypertension    • Hypothyroidism    • Rectocele    • Urgency of urination    • UTI (urinary tract infection) with pyuria        Past Surgical History:   Procedure Laterality Date   • ANTERIOR AND POSTERIOR VAGINAL REPAIR N/A 8/13/2018    Procedure: ANTERIOR AND POSTERIOR VAGINAL REPAIR;  Surgeon: Arlene Ocasio MD;  Location: [facility];  Service: Obstetrics/Gynecology   • CATARACT EXTRACTION W/ INTRAOCULAR LENS IMPLANT     • CHOLECYSTECTOMY     • CYSTOSCOPY  09/2002   • HERNIA REPAIR     • TONSILLECTOMY         Social History     Social History   • Marital status:      Social History Main Topics   • Smoking status: Former Smoker   • Smokeless tobacco: Never Used   • Alcohol use No   • Drug use: No   • Sexual activity: Defer     Other Topics Concern   • Not on file       Family History   Problem Relation Age of Onset   • Prostate cancer Father    • No Known Problems Mother          /70  "  Temp 97.6 °F (36.4 °C)   Ht 165.1 cm (65\")   Wt 59.7 kg (131 lb 9.6 oz)   BMI 21.90 kg/m²       Physical Exam  Constitutional:  They  appear well-developed and well-nourished. There are no obvious deformities. No distress.   Pulmonary/Chest: Effort normal.   GI: Soft. The patient exhibits no distension and no mass. There is no tenderness. There is no rebound and no guarding. No hernia.   Neurological: Patient is alert and oriented to person, place, and time.   Skin: Skin is warm and dry. Not diaphoretic.   Psychiatric:  normal mood and affect. Not agitated.         Data  Results for orders placed or performed in visit on 09/06/18   POC Urinalysis Dipstick, Multipro   Result Value Ref Range    Color Yellow Yellow, Straw, Dark Yellow, Arlene    Clarity, UA Clear Clear    Glucose, UA Negative Negative, 1000 mg/dL (3+) mg/dL    Bilirubin Negative Negative    Ketones, UA Negative Negative    Specific Gravity  1.010 1.005 - 1.030    Blood, UA Large (A) Negative    pH, Urine 6.5 5.0 - 8.0    Protein, POC Negative Negative mg/dL    Urobilinogen, UA Normal Normal    Nitrite, UA Negative Negative    Leukocytes Moderate (2+) (A) Negative       Microscopic Urinalysis  I inspected the urine myself based on the clinical situation including the dipstick urine. The urine is spun in a centrifuge for three minutes. The spun urine shows 0-2 rbc/hpf, 0-2 wbc/hpf, 0-2 epi/hpf, negative bacteria, negative crystals, and negative casts.       Assessment and Plan  Diagnoses and all orders for this visit:    Recurrent cystitis  -     POC Urinalysis Dipstick, Multipro      -Had A&P repair  -She is now on Premarin cream  -Concluded prophylactic antibiotic.       (Please note that portions of this note were completed with a voice recognition program.)  Lazaro Ba MD  09/06/18  11:03 AM      Cc: Hair Echols MD  "

## 2018-09-06 ENCOUNTER — OFFICE VISIT (OUTPATIENT)
Dept: UROLOGY | Facility: CLINIC | Age: 73
End: 2018-09-06

## 2018-09-06 VITALS
WEIGHT: 131.6 LBS | BODY MASS INDEX: 21.92 KG/M2 | HEIGHT: 65 IN | DIASTOLIC BLOOD PRESSURE: 70 MMHG | TEMPERATURE: 97.6 F | SYSTOLIC BLOOD PRESSURE: 130 MMHG

## 2018-09-06 DIAGNOSIS — N95.2 ATROPHIC VAGINITIS: ICD-10-CM

## 2018-09-06 DIAGNOSIS — N30.90 RECURRENT CYSTITIS: Primary | ICD-10-CM

## 2018-09-06 PROCEDURE — 81001 URINALYSIS AUTO W/SCOPE: CPT | Performed by: UROLOGY

## 2018-09-06 PROCEDURE — 99213 OFFICE O/P EST LOW 20 MIN: CPT | Performed by: UROLOGY

## 2018-09-26 PROBLEM — Z12.11 ENCOUNTER FOR SCREENING COLONOSCOPY: Status: RESOLVED | Noted: 2017-03-08 | Resolved: 2018-09-26

## 2018-09-28 ENCOUNTER — OFFICE VISIT (OUTPATIENT)
Dept: OBSTETRICS AND GYNECOLOGY | Facility: CLINIC | Age: 73
End: 2018-09-28

## 2018-09-28 VITALS
SYSTOLIC BLOOD PRESSURE: 118 MMHG | WEIGHT: 128 LBS | BODY MASS INDEX: 21.33 KG/M2 | DIASTOLIC BLOOD PRESSURE: 80 MMHG | HEIGHT: 65 IN

## 2018-09-28 DIAGNOSIS — Z09 S/P GYNECOLOGICAL SURGERY, FOLLOW-UP EXAM: Primary | ICD-10-CM

## 2018-09-28 PROCEDURE — 99024 POSTOP FOLLOW-UP VISIT: CPT | Performed by: OBSTETRICS & GYNECOLOGY

## 2018-09-28 RX ORDER — CETIRIZINE HCL/PSEUDOEPHEDRINE 5 MG-120MG
1 TABLET, EXTENDED RELEASE 12 HR ORAL DAILY
Refills: 1 | COMMUNITY
Start: 2018-09-19 | End: 2018-11-01

## 2018-10-05 ENCOUNTER — TELEPHONE (OUTPATIENT)
Dept: UROLOGY | Facility: CLINIC | Age: 73
End: 2018-10-05

## 2018-10-05 NOTE — TELEPHONE ENCOUNTER
Pt called and believes she has a UTI. Pt wanted to bring in urine specimen to clinic. Pt was informed we no longer take walk in urine specimens and she would need to go to her PCP or to an urgent care. Pt voiced understanding.

## 2018-10-29 ENCOUNTER — TELEPHONE (OUTPATIENT)
Dept: UROLOGY | Facility: CLINIC | Age: 73
End: 2018-10-29

## 2018-10-29 NOTE — TELEPHONE ENCOUNTER
Patient calls stating she has been seen at [facility] in [place] over the last months with recurrent UTI. She has been on several medications and still feels like the issue is not resolved. She requests appt with Dr Ba. I have faxed req for recs to [facility] and told patient I would call after they are reviewed. She voiced understanding.

## 2018-10-31 ENCOUNTER — TELEPHONE (OUTPATIENT)
Dept: VASCULAR SURGERY | Facility: CLINIC | Age: 73
End: 2018-10-31

## 2018-11-01 ENCOUNTER — OFFICE VISIT (OUTPATIENT)
Dept: VASCULAR SURGERY | Facility: CLINIC | Age: 73
End: 2018-11-01

## 2018-11-01 VITALS
DIASTOLIC BLOOD PRESSURE: 72 MMHG | HEART RATE: 83 BPM | BODY MASS INDEX: 23.04 KG/M2 | HEIGHT: 63 IN | WEIGHT: 130 LBS | OXYGEN SATURATION: 98 % | SYSTOLIC BLOOD PRESSURE: 122 MMHG

## 2018-11-01 DIAGNOSIS — I87.2 VENOUS (PERIPHERAL) INSUFFICIENCY: Primary | ICD-10-CM

## 2018-11-01 DIAGNOSIS — I10 ESSENTIAL HYPERTENSION: ICD-10-CM

## 2018-11-01 DIAGNOSIS — M79.89 LEG SWELLING: ICD-10-CM

## 2018-11-01 PROCEDURE — 99203 OFFICE O/P NEW LOW 30 MIN: CPT | Performed by: SURGERY

## 2018-11-01 RX ORDER — MULTIVITAMIN WITH IRON
500 TABLET ORAL DAILY
COMMUNITY

## 2018-11-01 RX ORDER — NITROFURANTOIN MACROCRYSTALS 100 MG/1
100 CAPSULE ORAL 2 TIMES DAILY
COMMUNITY
End: 2018-12-12

## 2018-11-01 NOTE — PATIENT INSTRUCTIONS
How to Use Compression Stockings  Compression stockings are elastic socks that squeeze the legs. They help to increase blood flow to the legs, decrease swelling in the legs, and reduce the chance of developing blood clots in the lower legs. Compression stockings are often used by people who:  · Are recovering from surgery.  · Have poor circulation in their legs.  · Are prone to getting blood clots in their legs.  · Have varicose veins.  · Sit or stay in bed for long periods of time.    How to use compression stockings  Before you put on your compression stockings:  · Make sure that they are the correct size. If you do not know your size, ask your health care provider.  · Make sure that they are clean, dry, and in good condition.  · Check them for rips and tears. Do not put them on if they are ripped or torn.    Put your stockings on first thing in the morning, before you get out of bed. Keep them on for as long as your health care provider advises. When you are wearing your stockings:  · Keep them as smooth as possible. Do not allow them to bunch up. It is especially important to prevent the stockings from bunching up around your toes or behind your knees.  · Do not roll the stockings downward and leave them rolled down. This can decrease blood flow to your leg.  · Change them right away if they become wet or dirty.    When you take off your stockings, inspect your legs and feet. Anything that does not seem normal may require medical attention. Look for:  · Open sores.  · Red spots.  · Swelling.    Information and tips  · Do not stop wearing your compression stockings without talking to your health care provider first.  · Wash your stockings every day with mild detergent in cold or warm water. Do not use bleach. Air-dry your stockings or dry them in a clothes dryer on low heat.  · Replace your stockings every 3-6 months.  · If skin moisturizing is part of your treatment plan, apply lotion or cream at night so that  your skin will be dry when you put on the stockings in the morning. It is harder to put the stockings on when you have lotion on your legs or feet.  Contact a health care provider if:  Remove your stockings and seek medical care if:  · You have a feeling of pins and needles in your feet or legs.  · You have any new changes in your skin.  · You have skin lesions that are getting worse.  · You have swelling or pain that is getting worse.    Get help right away if:  · You have numbness or tingling in your lower legs that does not get better right after you take the stockings off.  · Your toes or feet become cold and blue.  · You develop open sores or red spots on your legs that do not go away.  · You see or feel a warm spot on your leg.  · You have new swelling or soreness in your leg.  · You are short of breath or you have chest pain for no reason.  · You have a rapid or irregular heartbeat.  · You feel light-headed or dizzy.  This information is not intended to replace advice given to you by your health care provider. Make sure you discuss any questions you have with your health care provider.  Document Released: 10/15/2010 Document Revised: 05/17/2017 Document Reviewed: 11/25/2015  [employer] Interactive Patient Education © 2018 [employer]

## 2018-11-01 NOTE — PROGRESS NOTES
11/01/2018      Hair Echols MD  [address] [facility]  [address] [address]    Dorcas Juarez  1945    Chief Complaint   Patient presents with   • Leg Pain     Former Ibrahim Patient with severe leg ache and cramps. Patient denies any stroke like symptoms.    • other     Patient states bilateral legs right now is just like aching, and during the night severe cramps that have her up 2-5 times a night. Patient states sometimes they feel like they are asleep like a numb feeling all the way into the feet. Patient states sometimes she will get out of her chair and it feels like the are stilts and arent going to work.        Dear Hair Echols MD:      HPI  I had the pleasure of seeing your patient Dorcas Juarez in the office today.  Thank you kindly for this consultation.  As you recall, Dorcas Juarez is a 73 y.o.  female who has previously been seen by office by Dr. Ibrahim.  She was last seen in February 2015.  Per notes she underwent left greater saphenous vein stripping or ablation by Dr. Rodriguez and EVLT of tributaries by Dr. Ibrahim.  She has complaints of  Aching and burning.  She also has complaints of severe cramping at night when she sleeps.  She does have compression stockings and wears them sometimes.  She did have recent arterial and venous testing, which I did review.      Past Medical History:   Diagnosis Date   • Abdominal pain    • Anticoagulant-induced hematuria    • Anxiety    • Arthritis    • Calculus of kidney     LEFT   • Clot hematuria    • Cystocele with rectocele    • Dysuria    • Hypertension    • Hypothyroidism    • Rectocele    • Urgency of urination    • UTI (urinary tract infection) with pyuria        Past Surgical History:   Procedure Laterality Date   • ANTERIOR AND POSTERIOR VAGINAL REPAIR N/A 8/13/2018    Procedure: ANTERIOR AND POSTERIOR VAGINAL REPAIR;  Surgeon: Arlene Ocasio MD;  Location: [facility];  Service: Obstetrics/Gynecology   • CATARACT EXTRACTION W/  INTRAOCULAR LENS IMPLANT     • CHOLECYSTECTOMY     • CYSTOSCOPY  09/2002   • HERNIA REPAIR     • TONSILLECTOMY         Family History   Problem Relation Age of Onset   • Prostate cancer Father    • No Known Problems Mother        Social History     Social History   • Marital status:      Spouse name: N/A   • Number of children: N/A   • Years of education: N/A     Occupational History   • Not on file.     Social History Main Topics   • Smoking status: Former Smoker   • Smokeless tobacco: Never Used   • Alcohol use No   • Drug use: No   • Sexual activity: Defer     Other Topics Concern   • Not on file     Social History Narrative   • No narrative on file       Allergies   Allergen Reactions   • Bactrim [Sulfamethoxazole-Trimethoprim] Diarrhea   • Penicillins Hives   • Vioxx [Rofecoxib] Diarrhea   • Levaquin [Levofloxacin] Unknown (See Comments)     Pt cant remember reaction   • Sulfa Antibiotics Itching       Prior to Admission medications    Medication Sig Start Date End Date Taking? Authorizing Provider   calcium citrate-vitamin d (CITRACAL) 200-250 MG-UNIT tablet tablet Take  by mouth Daily.   Yes Josefina Kim MD   cetirizine (ZyrTEC) 10 MG tablet Take 10 mg by mouth daily.   Yes Josefina Kim MD   conjugated estrogens (PREMARIN) 0.625 MG/GM vaginal cream Insert  into the vagina Daily. 8/29/18  Yes Arlene Ocasio MD   fluticasone (FLONASE) 50 MCG/ACT nasal spray 2 sprays into each nostril daily. Administer 2 sprays in each nostril for each dose.   Yes Josefina Kim MD   glucosamine sulfate 500 MG capsule capsule Take  by mouth 3 (Three) Times a Day With Meals.   Yes Josefina Kim MD   levothyroxine (SYNTHROID, LEVOTHROID) 125 MCG tablet Take 75 mcg by mouth Daily.   Yes Josefina Kim MD   LORazepam (ATIVAN) 1 MG tablet Take 1 mg by mouth every 8 (eight) hours as needed for anxiety.   Yes Josefina Kim MD   losartan (COZAAR) 50 MG tablet Take 50 mg by mouth  "Daily. 12/8/17  Yes Josefina Kim MD   Magnesium 250 MG tablet Take 250 mg by mouth Daily.   Yes Josefina Kim MD   meloxicam (MOBIC) 15 MG tablet Take 1 tablet by mouth daily. 8/11/16  Yes Josefina Kim MD   Multiple Vitamins-Minerals (MULTIVITAMIN ADULT PO) 1 tablet Daily. 7/6/11  Yes Josefina Kim MD   nitrofurantoin (MACRODANTIN) 100 MG capsule Take 100 mg by mouth 2 (Two) Times a Day.   Yes Josefina Kim MD   Omega 3-6-9 Fatty Acids (OMEGA 3-6-9 COMPLEX PO) Take 1 capsule by mouth 2 (Two) Times a Day.   Yes Josefina Kim MD   temazepam (RESTORIL) 30 MG capsule Take 1 capsule by mouth daily. 7/30/16  Yes Provider, Historical, MD   rOPINIRole (REQUIP) 1 MG tablet Take 1 mg by mouth Every Night. Take 1 hour before bedtime.  11/1/18  Josefina Kim MD   ZYRTEC-D ALLERGY & CONGESTION 5-120 MG per 12 hr tablet Take 1 tablet by mouth Daily. 9/19/18 11/1/18  Josefina Kim MD       Review of Systems   Constitutional: Negative.    HENT: Negative.    Eyes: Negative.    Respiratory: Negative.    Cardiovascular: Negative.         Bilateral leg pain   Gastrointestinal: Negative.    Endocrine: Negative.    Genitourinary: Negative.    Musculoskeletal: Negative.    Skin: Negative.    Allergic/Immunologic: Negative.    Neurological: Positive for numbness.   Hematological: Negative.    Psychiatric/Behavioral: Negative.        /72 (BP Location: Right arm, Patient Position: Sitting, Cuff Size: Adult)   Pulse 83   Ht 160 cm (63\")   Wt 59 kg (130 lb)   SpO2 98%   BMI 23.03 kg/m²   Physical Exam   Constitutional: She is oriented to person, place, and time. She appears well-developed and well-nourished. No distress.   HENT:   Head: Normocephalic and atraumatic.   Mouth/Throat: Oropharynx is clear and moist.   Eyes: Pupils are equal, round, and reactive to light. No scleral icterus.   Neck: Normal range of motion. Neck supple. No JVD present. Carotid bruit is not " present. No thyromegaly present.   Cardiovascular: Normal rate, regular rhythm, S2 normal, normal heart sounds, intact distal pulses and normal pulses.  Exam reveals no gallop and no friction rub.    No murmur heard.  Varicose veins to bilateral lower extremities   Pulmonary/Chest: Effort normal and breath sounds normal.   Abdominal: Soft. Normal aorta and bowel sounds are normal. There is no hepatosplenomegaly.   Musculoskeletal: Normal range of motion.   Neurological: She is alert and oriented to person, place, and time. No cranial nerve deficit.   Skin: Skin is warm and dry. She is not diaphoretic.   Psychiatric: She has a normal mood and affect. Her behavior is normal. Judgment and thought content normal.   Nursing note and vitals reviewed.      Diagnostic Data:  History: PAD.     Comments: Gray scale imaging as well as color flow duplex were used to  evaluate bilateral lower extremity arterial systems.     On the right, the peak systolic velocity the common femoral artery  measured 121 cm/s. In the proximal SFA measured 145 cm/s. In the mid SFA  measured 134 cm/s. In the distal SFA measured 120 cm/s. In the popliteal  artery measured 75 cm/s. In the posterior tibial artery measured 126  cm/s. In the anterior tibial artery measured 45 cm/s.     On the left, the peak systolic velocity in the common femoral artery  measured 132 cm/s. In the proximal SFA measured 124 cm/s. In the mid SFA  measured 130 cm/s. In the distal SFA measured 123 cm/s. In the popliteal  artery measured 76 cm/s. In the posterior tibial artery measured 88  cm/s. In the anterior tibial artery measured 73 cm/s.     IMPRESSION:  Patent bilateral lower extremity arterial systems without  evidence of significant stenosis.        This report was finalized on 06/19/2018 17:32 by Dr. Rolando Anguiano MD.    US VENOUS DOPPLER LOWER EXTREMITY BILATERAL (DUPLEX)- 6/13/2018 12:38 PM  CDT     HISTORY: M79.604, M79.605, I73.9; M79.604-Pain in right  leg;  M79.605-Pain in left leg; I73.9-Peripheral vascular disease, unspecified     COMPARISON: NONE     FINDINGS:  Transverse and longitudinal grayscale and Doppler sonographic images of  bilateral lower extremities were obtained.     The common femoral, femoral and popliteal veins are all compressible and  demonstrate normal augmentation and color flow. There is normal flow and  compressibility of bilateral anterior tibial, and posterior tibial  veins.     IMPRESSION:  Duplex ultrasound of bilateral lower extremities without evidence of  DVT.     This report was finalized on 06/13/2018 13:12 by Dr. Veronique Parra MD.      Patient Active Problem List   Diagnosis   • Hypertension         ICD-10-CM ICD-9-CM   1. Venous (peripheral) insufficiency I87.2 459.81   2. Leg swelling M79.89 729.81   3. Essential hypertension I10 401.9           Plan: After thoroughly evaluating Dorcas Juarez, I believe the best course of action is to proceed with some further testing.  I would like for her to undergo a 2 leg VVI in 1 month from now.  In the meantime, she can continue continue to wear knee-high compression stockings in the 20-30 mm pressure gradient range.  She may be candidate for endovenous closure.  We instructed her on how to wear the stockings on a daily basis and to keep her legs elevated when she is not on them. I did discuss vascular risk factors as they pertain to the progression of vascular disease including controlling hypertension.  The patient can continue taking their current medication regimen as previously planned.  This was all discussed in full with complete understanding.    Thank you for allowing me to participate in the care of your patient.  Please do not hesitate with any questions or concerns.  I will keep you aware of any further encounters with Dorcas Juarez.        Sincerely yours,         Rolando Anguiano, DO

## 2018-11-12 ENCOUNTER — TELEPHONE (OUTPATIENT)
Dept: UROLOGY | Facility: CLINIC | Age: 73
End: 2018-11-12

## 2018-11-12 NOTE — TELEPHONE ENCOUNTER
Pt called complaining that symptoms began a week ago. Pt has been taking AZO because it relieves the symptoms. She has urinary frequency, burning w/ urination, no fever, some flank pain, nausea, and no blood in urine. Pt would like to know if she can be seen today or sooner than Monday 11/19/18. Pt stated she has been dealing with this since she went to Urgent Care. They tested her urine and it was negative of an infection.

## 2018-11-12 NOTE — TELEPHONE ENCOUNTER
Called to inform pt that we will keep her appt on Monday to see Dr. Ba after rceiving a message back for BETINA Robertson. Pt was unavailable so I left message for her to amy the office back.

## 2018-11-13 NOTE — PROGRESS NOTES
Ms. Juarez is 73 y.o. female    CHIEF COMPLAINT: I am here for follow up on recurrent cystitis.    HPI  She returns today for recurrent symptoms.  Per my last note she had some improvement after undergoing anterior posterior repair by .  She is continuing to get recurrent urgency and frequency.  I treated her for atrophic vaginitis and we will try prophylactic antibiotics for recurrent urinary tract infection.  She still has these persistent voiding symptoms that have not responded anticholinergic therapy in the past.  She is asking me about seeking another opinion if I have no other options for her.      The following portions of the patient's history were reviewed and updated as appropriate: allergies, current medications, past family history, past medical history, past social history, past surgical history and problem list.      Review of Systems   Constitutional: Negative for chills and fever.   Gastrointestinal: Negative for abdominal pain, anal bleeding and blood in stool.   Genitourinary: Positive for frequency, pelvic pain and urgency. Negative for dysuria and hematuria.           Current Outpatient Medications:   •  calcium citrate-vitamin d (CITRACAL) 200-250 MG-UNIT tablet tablet, Take  by mouth Daily., Disp: , Rfl:   •  cetirizine (ZyrTEC) 10 MG tablet, Take 10 mg by mouth daily., Disp: , Rfl:   •  conjugated estrogens (PREMARIN) 0.625 MG/GM vaginal cream, Insert  into the vagina Daily., Disp: 30 g, Rfl: 2  •  fluticasone (FLONASE) 50 MCG/ACT nasal spray, 2 sprays into each nostril daily. Administer 2 sprays in each nostril for each dose., Disp: , Rfl:   •  glucosamine sulfate 500 MG capsule capsule, Take  by mouth 3 (Three) Times a Day With Meals., Disp: , Rfl:   •  levothyroxine (SYNTHROID, LEVOTHROID) 125 MCG tablet, Take 75 mcg by mouth Daily., Disp: , Rfl:   •  LORazepam (ATIVAN) 1 MG tablet, Take 1 mg by mouth every 8 (eight) hours as needed for anxiety., Disp: , Rfl:   •  losartan  "(COZAAR) 50 MG tablet, Take 50 mg by mouth Daily., Disp: , Rfl:   •  Magnesium 250 MG tablet, Take 250 mg by mouth Daily., Disp: , Rfl:   •  meloxicam (MOBIC) 15 MG tablet, Take 1 tablet by mouth daily., Disp: , Rfl: 5  •  Multiple Vitamins-Minerals (MULTIVITAMIN ADULT PO), 1 tablet Daily., Disp: , Rfl:   •  nitrofurantoin (MACRODANTIN) 100 MG capsule, Take 100 mg by mouth 2 (Two) Times a Day., Disp: , Rfl:   •  Omega 3-6-9 Fatty Acids (OMEGA 3-6-9 COMPLEX PO), Take 1 capsule by mouth 2 (Two) Times a Day., Disp: , Rfl:   •  temazepam (RESTORIL) 30 MG capsule, Take 1 capsule by mouth daily., Disp: , Rfl: 2    Past Medical History:   Diagnosis Date   • Abdominal pain    • Anticoagulant-induced hematuria    • Anxiety    • Arthritis    • Calculus of kidney     LEFT   • Clot hematuria    • Cystocele with rectocele    • Dysuria    • Hypertension    • Hypothyroidism    • Rectocele    • Urgency of urination    • UTI (urinary tract infection) with pyuria        Past Surgical History:   Procedure Laterality Date   • CATARACT EXTRACTION W/ INTRAOCULAR LENS IMPLANT     • CHOLECYSTECTOMY     • CYSTOSCOPY  09/2002   • HERNIA REPAIR     • TONSILLECTOMY         Social History     Socioeconomic History   • Marital status:      Spouse name: Not on file   • Number of children: Not on file   • Years of education: Not on file   • Highest education level: Not on file   Tobacco Use   • Smoking status: Former Smoker   • Smokeless tobacco: Never Used   Substance and Sexual Activity   • Alcohol use: No   • Drug use: No   • Sexual activity: Defer       Family History   Problem Relation Age of Onset   • Prostate cancer Father    • No Known Problems Mother          /70   Temp 98.8 °F (37.1 °C)   Ht 160 cm (63\")   Wt 60.4 kg (133 lb 3.2 oz)   BMI 23.60 kg/m²       Physical Exam  Neuro: No confusion; No disorientation; Alert and oriented  Pulmonary: No respiratory distress.   Skin: No pallor or diaphoresis        Data  Results " for orders placed or performed in visit on 11/19/18   POC Urinalysis Dipstick, Multipro   Result Value Ref Range    Color Yellow Yellow, Straw, Dark Yellow, Arlene    Clarity, UA Clear Clear    Glucose,  mg/dL (A) Negative, 1000 mg/dL (3+) mg/dL    Bilirubin Negative Negative    Ketones, UA Negative Negative    Specific Gravity  1.020 1.005 - 1.030    Blood, UA Small (A) Negative    pH, Urine 7.0 5.0 - 8.0    Protein, POC Negative Negative mg/dL    Urobilinogen, UA Normal Normal    Nitrite, UA Positive (A) Negative    Leukocytes Small (1+) (A) Negative       Patient's Body mass index is 23.6 kg/m². BMI is within normal parameters. No follow-up required.    Assessment and Plan  Diagnoses and all orders for this visit:    Recurrent cystitis  -     POC Urinalysis Dipstick, Multipro    Dysuria    - I did tell her that I really do not know anything else to try with her.  I am going to send a urine culture today to see if she has got a recurrent infection.  If so we will obviously treat her with antibiotics.  I have also tried everything I noted due to prevent further urinary tract infections.  She has decided she would like to seek second opinion from [facility] in [place]. I do think that's appropriate.       (Please note that portions of this note were completed with a voice recognition program.)  Lazaro Ba MD  11/19/18  3:37 PM      Cc: Hair Echols MD

## 2018-11-14 ENCOUNTER — TELEPHONE (OUTPATIENT)
Dept: VASCULAR SURGERY | Facility: CLINIC | Age: 73
End: 2018-11-14

## 2018-11-14 NOTE — TELEPHONE ENCOUNTER
Spoke with pt about testing and appointment being changed due to scheduling conflict. Pt expressed understanding about the changed. Mailed new appt. Letter.

## 2018-11-19 ENCOUNTER — OFFICE VISIT (OUTPATIENT)
Dept: UROLOGY | Facility: CLINIC | Age: 73
End: 2018-11-19

## 2018-11-19 VITALS
TEMPERATURE: 98.8 F | DIASTOLIC BLOOD PRESSURE: 70 MMHG | BODY MASS INDEX: 23.6 KG/M2 | SYSTOLIC BLOOD PRESSURE: 140 MMHG | HEIGHT: 63 IN | WEIGHT: 133.2 LBS

## 2018-11-19 DIAGNOSIS — N30.90 RECURRENT CYSTITIS: Primary | ICD-10-CM

## 2018-11-19 DIAGNOSIS — R30.0 DYSURIA: ICD-10-CM

## 2018-11-19 LAB
BILIRUB BLD-MCNC: NEGATIVE MG/DL
CLARITY, POC: CLEAR
COLOR UR: YELLOW
GLUCOSE UR STRIP-MCNC: ABNORMAL MG/DL
KETONES UR QL: NEGATIVE
LEUKOCYTE EST, POC: ABNORMAL
NITRITE UR-MCNC: POSITIVE MG/ML
PH UR: 7 [PH] (ref 5–8)
PROT UR STRIP-MCNC: NEGATIVE MG/DL
RBC # UR STRIP: ABNORMAL /UL
SP GR UR: 1.02 (ref 1–1.03)
UROBILINOGEN UR QL: NORMAL

## 2018-11-19 PROCEDURE — 81001 URINALYSIS AUTO W/SCOPE: CPT | Performed by: UROLOGY

## 2018-11-19 PROCEDURE — 99213 OFFICE O/P EST LOW 20 MIN: CPT | Performed by: UROLOGY

## 2018-11-21 ENCOUNTER — TELEPHONE (OUTPATIENT)
Dept: UROLOGY | Facility: CLINIC | Age: 73
End: 2018-11-21

## 2018-11-21 NOTE — TELEPHONE ENCOUNTER
Patient was calling about her urine culture that was done on her last visit with Jesenia. She was told to call today to find out the results.

## 2018-12-06 ENCOUNTER — TELEPHONE (OUTPATIENT)
Dept: VASCULAR SURGERY | Facility: CLINIC | Age: 73
End: 2018-12-06

## 2018-12-06 NOTE — TELEPHONE ENCOUNTER
Reminded the patient of her appts for testing and office visit with Stacy tomorrow.  She was aware of these appts and confirmed.

## 2018-12-07 ENCOUNTER — HOSPITAL ENCOUNTER (OUTPATIENT)
Dept: ULTRASOUND IMAGING | Facility: HOSPITAL | Age: 73
Discharge: HOME | End: 2018-12-07
Admitting: NURSE PRACTITIONER

## 2018-12-07 ENCOUNTER — OFFICE VISIT (OUTPATIENT)
Dept: VASCULAR SURGERY | Facility: CLINIC | Age: 73
End: 2018-12-07

## 2018-12-07 VITALS
DIASTOLIC BLOOD PRESSURE: 76 MMHG | BODY MASS INDEX: 23.04 KG/M2 | WEIGHT: 130 LBS | SYSTOLIC BLOOD PRESSURE: 122 MMHG | HEIGHT: 63 IN | OXYGEN SATURATION: 96 % | HEART RATE: 90 BPM

## 2018-12-07 DIAGNOSIS — Z51.81 ENCOUNTER FOR MONITORING ANTIPLATELET THERAPY: ICD-10-CM

## 2018-12-07 DIAGNOSIS — I87.2 VENOUS (PERIPHERAL) INSUFFICIENCY: Primary | ICD-10-CM

## 2018-12-07 DIAGNOSIS — Z01.818 PREOP TESTING: ICD-10-CM

## 2018-12-07 DIAGNOSIS — M79.89 LEG SWELLING: ICD-10-CM

## 2018-12-07 DIAGNOSIS — Z79.02 ENCOUNTER FOR MONITORING ANTIPLATELET THERAPY: ICD-10-CM

## 2018-12-07 PROCEDURE — 93970 EXTREMITY STUDY: CPT | Performed by: SURGERY

## 2018-12-07 PROCEDURE — 99214 OFFICE O/P EST MOD 30 MIN: CPT | Performed by: NURSE PRACTITIONER

## 2018-12-07 PROCEDURE — 93970 EXTREMITY STUDY: CPT

## 2018-12-07 NOTE — PROGRESS NOTES
"12/7/2018        Hair Echols MD  [address] [facility] [address]  [place] [address]    Dorcas Juarez  1945    Chief Complaint   Patient presents with   • Follow-up     1 Month Follow UP for Venous (peripheral) Insufficiency. Test 12/07/2018 US pad venous lower ext bilat. Patient denies any stroke like symptoms.    • Leg Pain     Patient states bilateral legs ache deepthi and patient states they have leg cramps three or four times a night and then they just ached all day after that.        Dear Hair Echols MD:      HPI  I had the pleasure of seeing your patient Dorcas Juarez in the office today.  Thank you kindly for this consultation.  As you recall, Dorcas Juarez is a 73 y.o.  female who has previously been seen by office by Dr. Ibrahim.  She was last seen in February 2015.  Per notes she underwent left greater saphenous vein stripping or ablation by Dr. Rodriguez and EVLT of tributaries by Dr. Ibrahim.  She has complaints of  Aching and burning.  She also has complaints of severe cramping at night when she sleeps.  She does have compression stockings and wears them without relief.  She did have noninvasive testing performed today, which I did review in office.       Review of Systems   Constitutional: Negative.    HENT: Negative.    Eyes: Negative.    Respiratory: Negative.    Cardiovascular: Negative.         Bilateral leg pain   Gastrointestinal: Negative.    Endocrine: Negative.    Genitourinary: Negative.    Musculoskeletal: Negative.    Skin: Negative.    Allergic/Immunologic: Negative.    Hematological: Negative.    Psychiatric/Behavioral: Negative.        /76   Pulse 90   Ht 160 cm (63\")   Wt 59 kg (130 lb)   SpO2 96%   BMI 23.03 kg/m²   Physical Exam   Constitutional: She is oriented to person, place, and time. She appears well-developed and well-nourished. No distress.   HENT:   Head: Normocephalic and atraumatic.   Mouth/Throat: Oropharynx is clear and moist.   Eyes: Pupils are " equal, round, and reactive to light. No scleral icterus.   Neck: Normal range of motion. Neck supple. No JVD present. Carotid bruit is not present. No thyromegaly present.   Cardiovascular: Normal rate, regular rhythm, S2 normal, normal heart sounds, intact distal pulses and normal pulses. Exam reveals no gallop and no friction rub.   No murmur heard.  Varicose veins to bilateral lower extremities   Pulmonary/Chest: Effort normal and breath sounds normal.   Abdominal: Soft. Normal aorta and bowel sounds are normal. There is no hepatosplenomegaly.   Musculoskeletal: Normal range of motion.   Neurological: She is alert and oriented to person, place, and time. No cranial nerve deficit.   Skin: Skin is warm and dry. She is not diaphoretic.   Psychiatric: She has a normal mood and affect. Her behavior is normal. Judgment and thought content normal.   Nursing note and vitals reviewed.      Diagnostic Data:  Us Venous Doppler Lower Extremity Bilateral (duplex)    Result Date: 12/7/2018  Narrative: History: Bilateral lower extremity swelling  Comment: Venous valvular insufficiency testing was performed in both legs using duplex ultrasound with rapid cuff deflation technique.  The common femoral veins, popliteal veins, posterior tibial veins, greater saphenous veins, and lesser saphenous veins were interrogated bilaterally.   On the right, the greater saphenous vein at the junction measures 5.7 mm. In the mid thigh measures 4.4 mm. Above the knee measures 3.4 mm. The mid calf measures 1.5mm. The ankle measures 2.0 mm. There is greater than 0.5 seconds of reflux from the saphenofemoral junction to the ankle. Proximally there is also an anterior branch that extends from zone 1 to zone 3 that measures 3.3 mm and has greater than 0.5 seconds of reflux noted. Lesser saphenous vein is 2.0mm at the knee, 3.2 mm at the mid calf, and 2.8 mm at the ankle. There is greater than 0.5 seconds of reflux from mid calf to the ankle. There is  no evidence of DVT.  On the left, the greater saphenous vein has been stripped from the saphenofemoral junction to the above knee level.. The mid calf measures 3.8 mm. The ankle measured 2.4 mm. There is a large varicose vein in the proximal to mid thigh. There is also a  noted at the proximal calf which measures 4.9 mm and has 0.6 seconds of reflux. There is greater than 0.5 seconds of reflux from the midcalf to the ankle. Lesser saphenous vein is 1.5mm at the knee, 1.2 mm at the mid calf, and 3.1 mm at the ankle. There is no evidence of significant reflux.. There is no evidence of DVT.      Impression: Significant reflux in the bilateral lower extremity greater saphenous veins as noted above. There is also significant reflux noted in the right lower extremity lesser saphenous vein. There is no evidence of reflux in the left lower extremity lesser saphenous vein. There is no evidence of DVT in the bilateral lower extremities.  This report was finalized on 12/07/2018 15:12 by Dr. Rodrigo Valadez MD.       Patient Active Problem List   Diagnosis   • Hypertension         ICD-10-CM ICD-9-CM   1. Venous (peripheral) insufficiency I87.2 459.81   2. Preop testing Z01.818 V72.84   3. Encounter for monitoring antiplatelet therapy Z51.81 V58.83    Z79.02 V58.63         Plan: After thoroughly evaluating Dorcas Juarez, I believe the best course of action is to proceed with a right lower extremity radiofrequency ablation of the greater saphenous vein and anterior accessory branch.  Risks of radiofrequency ablation include, but are not limited to, bleeding, infection, vessel damage, nerve damage, DVT, phlebitis, and pulmonary embolus.  The patient understands these risks and wishes to proceed with procedure.  In the meantime, she can continue continue to wear knee-high compression stockings in the 20-30 mm pressure gradient range.   We instructed her on how to wear the stockings on a daily basis and to keep her legs  elevated when she is not on them. I did discuss vascular risk factors as they pertain to the progression of vascular disease including controlling hypertension.  The patient can continue taking their current medication regimen as previously planned.  This was all discussed in full with complete understanding.    Thank you for allowing me to participate in the care of your patient.  Please do not hesitate with any questions or concerns.  I will keep you aware of any further encounters with Dorcas Juarez.        Sincerely yours,         CRYSTAL Whitaker

## 2018-12-07 NOTE — H&P (VIEW-ONLY)
12/7/2018           Hair Echols MD  [address]  [facility] [address]  [place] [address]     Dorcas Juarez  1945          Chief Complaint   Patient presents with   • Follow-up       1 Month Follow UP for Venous (peripheral) Insufficiency. Test 12/07/2018  pad venous lower ext bilat. Patient denies any stroke like symptoms.    • Leg Pain       Patient states bilateral legs ache deepthi and patient states they have leg cramps three or four times a night and then they just ached all day after that.          Dear Hair Echols MD:        HPI  I had the pleasure of seeing your patient Dorcas Juarez in the office today.  Thank you kindly for this consultation.  As you recall, Dorcas Juarez is a 73 y.o.  female who has previously been seen by office by Dr. Ibrahim.  She was last seen in February 2015.  Per notes she underwent left greater saphenous vein stripping or ablation by Dr. Rodriguez and EVLT of tributaries by Dr. Ibrahim.  She has complaints of  Aching and burning.  She also has complaints of severe cramping at night when she sleeps.  She does have compression stockings and wears them without relief.  She did have noninvasive testing performed today, which I did review in office.     Past Medical History:   Diagnosis Date   • Abdominal pain    • Anticoagulant-induced hematuria    • Anxiety    • Arthritis    • Calculus of kidney     LEFT   • Clot hematuria    • Cystocele with rectocele    • Dysuria    • Hypertension    • Hypothyroidism    • Rectocele    • Urgency of urination    • UTI (urinary tract infection) with pyuria      Past Surgical History:   Procedure Laterality Date   • CATARACT EXTRACTION W/ INTRAOCULAR LENS IMPLANT     • CHOLECYSTECTOMY     • CYSTOSCOPY  09/2002   • HERNIA REPAIR     • TONSILLECTOMY       Family History   Problem Relation Age of Onset   • Prostate cancer Father    • No Known Problems Mother      Social History     Tobacco Use   • Smoking status: Former Smoker   • Smokeless  tobacco: Never Used   Substance Use Topics   • Alcohol use: No   • Drug use: No     Allergies   Allergen Reactions   • Bactrim [Sulfamethoxazole-Trimethoprim] Diarrhea   • Penicillins Hives   • Vioxx [Rofecoxib] Diarrhea   • Levaquin [Levofloxacin] Unknown (See Comments)     Pt cant remember reaction   • Sulfa Antibiotics Itching       Current Outpatient Medications:   •  calcium citrate-vitamin d (CITRACAL) 200-250 MG-UNIT tablet tablet, Take  by mouth Daily., Disp: , Rfl:   •  cetirizine (ZyrTEC) 10 MG tablet, Take 10 mg by mouth daily., Disp: , Rfl:   •  conjugated estrogens (PREMARIN) 0.625 MG/GM vaginal cream, Insert  into the vagina Daily., Disp: 30 g, Rfl: 2  •  fluticasone (FLONASE) 50 MCG/ACT nasal spray, 2 sprays into each nostril daily. Administer 2 sprays in each nostril for each dose., Disp: , Rfl:   •  glucosamine sulfate 500 MG capsule capsule, Take  by mouth 3 (Three) Times a Day With Meals., Disp: , Rfl:   •  levothyroxine (SYNTHROID, LEVOTHROID) 125 MCG tablet, Take 75 mcg by mouth Daily., Disp: , Rfl:   •  LORazepam (ATIVAN) 1 MG tablet, Take 1 mg by mouth every 8 (eight) hours as needed for anxiety., Disp: , Rfl:   •  losartan (COZAAR) 50 MG tablet, Take 50 mg by mouth Daily., Disp: , Rfl:   •  Magnesium 250 MG tablet, Take 250 mg by mouth Daily., Disp: , Rfl:   •  meloxicam (MOBIC) 15 MG tablet, Take 1 tablet by mouth daily., Disp: , Rfl: 5  •  Multiple Vitamins-Minerals (MULTIVITAMIN ADULT PO), 1 tablet Daily., Disp: , Rfl:   •  nitrofurantoin (MACRODANTIN) 100 MG capsule, Take 100 mg by mouth 2 (Two) Times a Day., Disp: , Rfl:   •  Omega 3-6-9 Fatty Acids (OMEGA 3-6-9 COMPLEX PO), Take 1 capsule by mouth 2 (Two) Times a Day., Disp: , Rfl:   •  temazepam (RESTORIL) 30 MG capsule, Take 1 capsule by mouth daily., Disp: , Rfl: 2        Review of Systems   Constitutional: Negative.    HENT: Negative.    Eyes: Negative.    Respiratory: Negative.    Cardiovascular: Negative.         Bilateral leg  "pain   Gastrointestinal: Negative.    Endocrine: Negative.    Genitourinary: Negative.    Musculoskeletal: Negative.    Skin: Negative.    Allergic/Immunologic: Negative.    Hematological: Negative.    Psychiatric/Behavioral: Negative.          /76   Pulse 90   Ht 160 cm (63\")   Wt 59 kg (130 lb)   SpO2 96%   BMI 23.03 kg/m²   Physical Exam   Constitutional: She is oriented to person, place, and time. She appears well-developed and well-nourished. No distress.   HENT:   Head: Normocephalic and atraumatic.   Mouth/Throat: Oropharynx is clear and moist.   Eyes: Pupils are equal, round, and reactive to light. No scleral icterus.   Neck: Normal range of motion. Neck supple. No JVD present. Carotid bruit is not present. No thyromegaly present.   Cardiovascular: Normal rate, regular rhythm, S2 normal, normal heart sounds, intact distal pulses and normal pulses. Exam reveals no gallop and no friction rub.   No murmur heard.  Varicose veins to bilateral lower extremities   Pulmonary/Chest: Effort normal and breath sounds normal.   Abdominal: Soft. Normal aorta and bowel sounds are normal. There is no hepatosplenomegaly.   Musculoskeletal: Normal range of motion.   Neurological: She is alert and oriented to person, place, and time. No cranial nerve deficit.   Skin: Skin is warm and dry. She is not diaphoretic.   Psychiatric: She has a normal mood and affect. Her behavior is normal. Judgment and thought content normal.   Nursing note and vitals reviewed.        Diagnostic Data:  Us Venous Doppler Lower Extremity Bilateral (duplex)     Result Date: 12/7/2018  Narrative: History: Bilateral lower extremity swelling  Comment: Venous valvular insufficiency testing was performed in both legs using duplex ultrasound with rapid cuff deflation technique.  The common femoral veins, popliteal veins, posterior tibial veins, greater saphenous veins, and lesser saphenous veins were interrogated bilaterally.   On the right, the " greater saphenous vein at the junction measures 5.7 mm. In the mid thigh measures 4.4 mm. Above the knee measures 3.4 mm. The mid calf measures 1.5mm. The ankle measures 2.0 mm. There is greater than 0.5 seconds of reflux from the saphenofemoral junction to the ankle. Proximally there is also an anterior branch that extends from zone 1 to zone 3 that measures 3.3 mm and has greater than 0.5 seconds of reflux noted. Lesser saphenous vein is 2.0mm at the knee, 3.2 mm at the mid calf, and 2.8 mm at the ankle. There is greater than 0.5 seconds of reflux from mid calf to the ankle. There is no evidence of DVT.  On the left, the greater saphenous vein has been stripped from the saphenofemoral junction to the above knee level.. The mid calf measures 3.8 mm. The ankle measured 2.4 mm. There is a large varicose vein in the proximal to mid thigh. There is also a  noted at the proximal calf which measures 4.9 mm and has 0.6 seconds of reflux. There is greater than 0.5 seconds of reflux from the midcalf to the ankle. Lesser saphenous vein is 1.5mm at the knee, 1.2 mm at the mid calf, and 3.1 mm at the ankle. There is no evidence of significant reflux.. There is no evidence of DVT.       Impression: Significant reflux in the bilateral lower extremity greater saphenous veins as noted above. There is also significant reflux noted in the right lower extremity lesser saphenous vein. There is no evidence of reflux in the left lower extremity lesser saphenous vein. There is no evidence of DVT in the bilateral lower extremities.  This report was finalized on 12/07/2018 15:12 by Dr. Rodrigo Valadez MD.           Patient Active Problem List   Diagnosis   • Hypertension             ICD-10-CM ICD-9-CM   1. Venous (peripheral) insufficiency I87.2 459.81   2. Preop testing Z01.818 V72.84   3. Encounter for monitoring antiplatelet therapy Z51.81 V58.83     Z79.02 V58.63            Plan: After thoroughly evaluating ARIAS Adams  believe the best course of action is to proceed with a right lower extremity radiofrequency ablation of the greater saphenous vein and anterior accessory branch.  Risks of radiofrequency ablation include, but are not limited to, bleeding, infection, vessel damage, nerve damage, DVT, phlebitis, and pulmonary embolus.  The patient understands these risks and wishes to proceed with procedure.  In the meantime, she can continue continue to wear knee-high compression stockings in the 20-30 mm pressure gradient range.   We instructed her on how to wear the stockings on a daily basis and to keep her legs elevated when she is not on them. I did discuss vascular risk factors as they pertain to the progression of vascular disease including controlling hypertension.  The patient can continue taking their current medication regimen as previously planned.  This was all discussed in full with complete understanding.     Thank you for allowing me to participate in the care of your patient.  Please do not hesitate with any questions or concerns.  I will keep you aware of any further encounters with Dorcas Juarez.           Sincerely yours,           CRYSTAL Whitaker

## 2018-12-07 NOTE — H&P
12/7/2018           Hair Echols MD  [address]  [facility] [address]  [place] [address]     Dorcas Juarez  1945          Chief Complaint   Patient presents with   • Follow-up       1 Month Follow UP for Venous (peripheral) Insufficiency. Test 12/07/2018  pad venous lower ext bilat. Patient denies any stroke like symptoms.    • Leg Pain       Patient states bilateral legs ache deepthi and patient states they have leg cramps three or four times a night and then they just ached all day after that.          Dear Hair Echols MD:        HPI  I had the pleasure of seeing your patient Dorcas Juarez in the office today.  Thank you kindly for this consultation.  As you recall, Dorcas Juarez is a 73 y.o.  female who has previously been seen by office by Dr. Ibrahim.  She was last seen in February 2015.  Per notes she underwent left greater saphenous vein stripping or ablation by Dr. Rodriguez and EVLT of tributaries by Dr. Ibrahim.  She has complaints of  Aching and burning.  She also has complaints of severe cramping at night when she sleeps.  She does have compression stockings and wears them without relief.  She did have noninvasive testing performed today, which I did review in office.     Past Medical History:   Diagnosis Date   • Abdominal pain    • Anticoagulant-induced hematuria    • Anxiety    • Arthritis    • Calculus of kidney     LEFT   • Clot hematuria    • Cystocele with rectocele    • Dysuria    • Hypertension    • Hypothyroidism    • Rectocele    • Urgency of urination    • UTI (urinary tract infection) with pyuria      Past Surgical History:   Procedure Laterality Date   • CATARACT EXTRACTION W/ INTRAOCULAR LENS IMPLANT     • CHOLECYSTECTOMY     • CYSTOSCOPY  09/2002   • HERNIA REPAIR     • TONSILLECTOMY       Family History   Problem Relation Age of Onset   • Prostate cancer Father    • No Known Problems Mother      Social History     Tobacco Use   • Smoking status: Former Smoker   • Smokeless  tobacco: Never Used   Substance Use Topics   • Alcohol use: No   • Drug use: No     Allergies   Allergen Reactions   • Bactrim [Sulfamethoxazole-Trimethoprim] Diarrhea   • Penicillins Hives   • Vioxx [Rofecoxib] Diarrhea   • Levaquin [Levofloxacin] Unknown (See Comments)     Pt cant remember reaction   • Sulfa Antibiotics Itching       Current Outpatient Medications:   •  calcium citrate-vitamin d (CITRACAL) 200-250 MG-UNIT tablet tablet, Take  by mouth Daily., Disp: , Rfl:   •  cetirizine (ZyrTEC) 10 MG tablet, Take 10 mg by mouth daily., Disp: , Rfl:   •  conjugated estrogens (PREMARIN) 0.625 MG/GM vaginal cream, Insert  into the vagina Daily., Disp: 30 g, Rfl: 2  •  fluticasone (FLONASE) 50 MCG/ACT nasal spray, 2 sprays into each nostril daily. Administer 2 sprays in each nostril for each dose., Disp: , Rfl:   •  glucosamine sulfate 500 MG capsule capsule, Take  by mouth 3 (Three) Times a Day With Meals., Disp: , Rfl:   •  levothyroxine (SYNTHROID, LEVOTHROID) 125 MCG tablet, Take 75 mcg by mouth Daily., Disp: , Rfl:   •  LORazepam (ATIVAN) 1 MG tablet, Take 1 mg by mouth every 8 (eight) hours as needed for anxiety., Disp: , Rfl:   •  losartan (COZAAR) 50 MG tablet, Take 50 mg by mouth Daily., Disp: , Rfl:   •  Magnesium 250 MG tablet, Take 250 mg by mouth Daily., Disp: , Rfl:   •  meloxicam (MOBIC) 15 MG tablet, Take 1 tablet by mouth daily., Disp: , Rfl: 5  •  Multiple Vitamins-Minerals (MULTIVITAMIN ADULT PO), 1 tablet Daily., Disp: , Rfl:   •  nitrofurantoin (MACRODANTIN) 100 MG capsule, Take 100 mg by mouth 2 (Two) Times a Day., Disp: , Rfl:   •  Omega 3-6-9 Fatty Acids (OMEGA 3-6-9 COMPLEX PO), Take 1 capsule by mouth 2 (Two) Times a Day., Disp: , Rfl:   •  temazepam (RESTORIL) 30 MG capsule, Take 1 capsule by mouth daily., Disp: , Rfl: 2        Review of Systems   Constitutional: Negative.    HENT: Negative.    Eyes: Negative.    Respiratory: Negative.    Cardiovascular: Negative.         Bilateral leg  "pain   Gastrointestinal: Negative.    Endocrine: Negative.    Genitourinary: Negative.    Musculoskeletal: Negative.    Skin: Negative.    Allergic/Immunologic: Negative.    Hematological: Negative.    Psychiatric/Behavioral: Negative.          /76   Pulse 90   Ht 160 cm (63\")   Wt 59 kg (130 lb)   SpO2 96%   BMI 23.03 kg/m²   Physical Exam   Constitutional: She is oriented to person, place, and time. She appears well-developed and well-nourished. No distress.   HENT:   Head: Normocephalic and atraumatic.   Mouth/Throat: Oropharynx is clear and moist.   Eyes: Pupils are equal, round, and reactive to light. No scleral icterus.   Neck: Normal range of motion. Neck supple. No JVD present. Carotid bruit is not present. No thyromegaly present.   Cardiovascular: Normal rate, regular rhythm, S2 normal, normal heart sounds, intact distal pulses and normal pulses. Exam reveals no gallop and no friction rub.   No murmur heard.  Varicose veins to bilateral lower extremities   Pulmonary/Chest: Effort normal and breath sounds normal.   Abdominal: Soft. Normal aorta and bowel sounds are normal. There is no hepatosplenomegaly.   Musculoskeletal: Normal range of motion.   Neurological: She is alert and oriented to person, place, and time. No cranial nerve deficit.   Skin: Skin is warm and dry. She is not diaphoretic.   Psychiatric: She has a normal mood and affect. Her behavior is normal. Judgment and thought content normal.   Nursing note and vitals reviewed.        Diagnostic Data:  Us Venous Doppler Lower Extremity Bilateral (duplex)     Result Date: 12/7/2018  Narrative: History: Bilateral lower extremity swelling  Comment: Venous valvular insufficiency testing was performed in both legs using duplex ultrasound with rapid cuff deflation technique.  The common femoral veins, popliteal veins, posterior tibial veins, greater saphenous veins, and lesser saphenous veins were interrogated bilaterally.   On the right, the " greater saphenous vein at the junction measures 5.7 mm. In the mid thigh measures 4.4 mm. Above the knee measures 3.4 mm. The mid calf measures 1.5mm. The ankle measures 2.0 mm. There is greater than 0.5 seconds of reflux from the saphenofemoral junction to the ankle. Proximally there is also an anterior branch that extends from zone 1 to zone 3 that measures 3.3 mm and has greater than 0.5 seconds of reflux noted. Lesser saphenous vein is 2.0mm at the knee, 3.2 mm at the mid calf, and 2.8 mm at the ankle. There is greater than 0.5 seconds of reflux from mid calf to the ankle. There is no evidence of DVT.  On the left, the greater saphenous vein has been stripped from the saphenofemoral junction to the above knee level.. The mid calf measures 3.8 mm. The ankle measured 2.4 mm. There is a large varicose vein in the proximal to mid thigh. There is also a  noted at the proximal calf which measures 4.9 mm and has 0.6 seconds of reflux. There is greater than 0.5 seconds of reflux from the midcalf to the ankle. Lesser saphenous vein is 1.5mm at the knee, 1.2 mm at the mid calf, and 3.1 mm at the ankle. There is no evidence of significant reflux.. There is no evidence of DVT.       Impression: Significant reflux in the bilateral lower extremity greater saphenous veins as noted above. There is also significant reflux noted in the right lower extremity lesser saphenous vein. There is no evidence of reflux in the left lower extremity lesser saphenous vein. There is no evidence of DVT in the bilateral lower extremities.  This report was finalized on 12/07/2018 15:12 by Dr. Rodrigo Valadez MD.           Patient Active Problem List   Diagnosis   • Hypertension             ICD-10-CM ICD-9-CM   1. Venous (peripheral) insufficiency I87.2 459.81   2. Preop testing Z01.818 V72.84   3. Encounter for monitoring antiplatelet therapy Z51.81 V58.83     Z79.02 V58.63            Plan: After thoroughly evaluating ARIAS Adams  believe the best course of action is to proceed with a right lower extremity radiofrequency ablation of the greater saphenous vein and anterior accessory branch.  Risks of radiofrequency ablation include, but are not limited to, bleeding, infection, vessel damage, nerve damage, DVT, phlebitis, and pulmonary embolus.  The patient understands these risks and wishes to proceed with procedure.  In the meantime, she can continue continue to wear knee-high compression stockings in the 20-30 mm pressure gradient range.   We instructed her on how to wear the stockings on a daily basis and to keep her legs elevated when she is not on them. I did discuss vascular risk factors as they pertain to the progression of vascular disease including controlling hypertension.  The patient can continue taking their current medication regimen as previously planned.  This was all discussed in full with complete understanding.     Thank you for allowing me to participate in the care of your patient.  Please do not hesitate with any questions or concerns.  I will keep you aware of any further encounters with Dorcas Juarez.           Sincerely yours,           CRYSTAL Whitaker

## 2018-12-10 ENCOUNTER — TELEPHONE (OUTPATIENT)
Dept: VASCULAR SURGERY | Facility: CLINIC | Age: 73
End: 2018-12-10

## 2018-12-10 PROBLEM — I87.2 VENOUS (PERIPHERAL) INSUFFICIENCY: Status: ACTIVE | Noted: 2018-12-10

## 2018-12-10 PROBLEM — Z01.818 PREOP TESTING: Status: ACTIVE | Noted: 2018-12-10

## 2018-12-10 NOTE — TELEPHONE ENCOUNTER
Spoke with patient and advised of upcoming procedure and pre work.  Patient is scheduled for procedure on 12/17/2018 with an arrival time of 5:15 am.  Patient pre work is scheduled for 12/12/2018 with a 11:15 am arrival time.  Patient advised of location, time, and prep.  Patient expressed understanding for all that was discussed

## 2018-12-12 ENCOUNTER — APPOINTMENT (OUTPATIENT)
Dept: PREADMISSION TESTING | Facility: HOSPITAL | Age: 73
End: 2018-12-12

## 2018-12-12 ENCOUNTER — HOSPITAL ENCOUNTER (OUTPATIENT)
Dept: GENERAL RADIOLOGY | Facility: HOSPITAL | Age: 73
Discharge: HOME | End: 2018-12-12
Admitting: NURSE PRACTITIONER

## 2018-12-12 VITALS
DIASTOLIC BLOOD PRESSURE: 59 MMHG | OXYGEN SATURATION: 97 % | HEART RATE: 81 BPM | HEIGHT: 63 IN | WEIGHT: 133.6 LBS | BODY MASS INDEX: 23.67 KG/M2 | SYSTOLIC BLOOD PRESSURE: 143 MMHG | RESPIRATION RATE: 16 BRPM

## 2018-12-12 DIAGNOSIS — Z79.02 ENCOUNTER FOR MONITORING ANTIPLATELET THERAPY: ICD-10-CM

## 2018-12-12 DIAGNOSIS — Z01.818 PREOP TESTING: ICD-10-CM

## 2018-12-12 DIAGNOSIS — I87.2 VENOUS (PERIPHERAL) INSUFFICIENCY: ICD-10-CM

## 2018-12-12 DIAGNOSIS — Z51.81 ENCOUNTER FOR MONITORING ANTIPLATELET THERAPY: ICD-10-CM

## 2018-12-12 LAB
ANION GAP SERPL CALCULATED.3IONS-SCNC: 9 MMOL/L (ref 4–13)
APTT PPP: 27.4 SECONDS (ref 24.1–34.8)
BASOPHILS # BLD AUTO: 0.07 10*3/MM3 (ref 0–0.2)
BASOPHILS NFR BLD AUTO: 0.8 % (ref 0–2)
BUN BLD-MCNC: 23 MG/DL (ref 5–21)
BUN/CREAT SERPL: 26.4 (ref 7–25)
CALCIUM SPEC-SCNC: 9.1 MG/DL (ref 8.4–10.4)
CHLORIDE SERPL-SCNC: 105 MMOL/L (ref 98–110)
CO2 SERPL-SCNC: 25 MMOL/L (ref 24–31)
CREAT BLD-MCNC: 0.87 MG/DL (ref 0.5–1.4)
DEPRECATED RDW RBC AUTO: 52.4 FL (ref 40–54)
EOSINOPHIL # BLD AUTO: 0.43 10*3/MM3 (ref 0–0.7)
EOSINOPHIL NFR BLD AUTO: 5.1 % (ref 0–4)
ERYTHROCYTE [DISTWIDTH] IN BLOOD BY AUTOMATED COUNT: 14.3 % (ref 12–15)
GFR SERPL CREATININE-BSD FRML MDRD: 64 ML/MIN/1.73
GLUCOSE BLD-MCNC: 83 MG/DL (ref 70–100)
HCT VFR BLD AUTO: 31 % (ref 37–47)
HGB BLD-MCNC: 10 G/DL (ref 12–16)
IMM GRANULOCYTES # BLD: 0.03 10*3/MM3 (ref 0–0.03)
IMM GRANULOCYTES NFR BLD: 0.4 % (ref 0–5)
INR PPP: 0.88 (ref 0.91–1.09)
LYMPHOCYTES # BLD AUTO: 2.2 10*3/MM3 (ref 0.72–4.86)
LYMPHOCYTES NFR BLD AUTO: 26.3 % (ref 15–45)
MCH RBC QN AUTO: 31.9 PG (ref 28–32)
MCHC RBC AUTO-ENTMCNC: 32.3 G/DL (ref 33–36)
MCV RBC AUTO: 99 FL (ref 82–98)
MONOCYTES # BLD AUTO: 0.74 10*3/MM3 (ref 0.19–1.3)
MONOCYTES NFR BLD AUTO: 8.8 % (ref 4–12)
NEUTROPHILS # BLD AUTO: 4.9 10*3/MM3 (ref 1.87–8.4)
NEUTROPHILS NFR BLD AUTO: 58.6 % (ref 39–78)
NRBC BLD MANUAL-RTO: 0 /100 WBC (ref 0–0)
PLATELET # BLD AUTO: 289 10*3/MM3 (ref 130–400)
PMV BLD AUTO: 9.8 FL (ref 6–12)
POTASSIUM BLD-SCNC: 4 MMOL/L (ref 3.5–5.3)
PROTHROMBIN TIME: 12.2 SECONDS (ref 11.9–14.6)
RBC # BLD AUTO: 3.13 10*6/MM3 (ref 4.2–5.4)
SODIUM BLD-SCNC: 139 MMOL/L (ref 135–145)
WBC NRBC COR # BLD: 8.37 10*3/MM3 (ref 4.8–10.8)

## 2018-12-12 PROCEDURE — 85025 COMPLETE CBC W/AUTO DIFF WBC: CPT | Performed by: NURSE PRACTITIONER

## 2018-12-12 PROCEDURE — 80048 BASIC METABOLIC PNL TOTAL CA: CPT | Performed by: NURSE PRACTITIONER

## 2018-12-12 PROCEDURE — 93010 ELECTROCARDIOGRAM REPORT: CPT | Performed by: INTERNAL MEDICINE

## 2018-12-12 PROCEDURE — 85730 THROMBOPLASTIN TIME PARTIAL: CPT | Performed by: NURSE PRACTITIONER

## 2018-12-12 PROCEDURE — 36415 COLL VENOUS BLD VENIPUNCTURE: CPT

## 2018-12-12 PROCEDURE — 71046 X-RAY EXAM CHEST 2 VIEWS: CPT

## 2018-12-12 PROCEDURE — 85610 PROTHROMBIN TIME: CPT | Performed by: NURSE PRACTITIONER

## 2018-12-12 PROCEDURE — 93005 ELECTROCARDIOGRAM TRACING: CPT

## 2018-12-12 RX ORDER — IBUPROFEN 200 MG
400 TABLET ORAL EVERY 6 HOURS PRN
COMMUNITY

## 2018-12-12 RX ORDER — OXYCODONE HYDROCHLORIDE AND ACETAMINOPHEN 5; 325 MG/1; MG/1
1 TABLET ORAL EVERY 4 HOURS PRN
COMMUNITY
End: 2019-03-26

## 2018-12-12 RX ORDER — LEVOTHYROXINE SODIUM 100 UG/1
100 CAPSULE ORAL DAILY
COMMUNITY

## 2018-12-12 RX ORDER — ROPINIROLE 1 MG/1
1 TABLET, FILM COATED ORAL NIGHTLY
COMMUNITY
End: 2019-03-26

## 2018-12-12 RX ORDER — NAPROXEN SODIUM 220 MG
220 TABLET ORAL 2 TIMES DAILY PRN
COMMUNITY

## 2018-12-12 RX ORDER — ASPIRIN 325 MG
325 TABLET ORAL DAILY PRN
COMMUNITY
End: 2019-03-26

## 2018-12-12 NOTE — DISCHARGE INSTRUCTIONS
DAY OF SURGERY INSTRUCTIONS        YOUR SURGEON: ELVIN JIANG    PROCEDURE: RIGHT SAPHENOUS VEIN RADIO FREQUENCY ABLATION    DATE OF SURGERY: December 17, 2018    ARRIVAL TIME: AS DIRECTED BY OFFICE    YOU MAY TAKE THE FOLLOWING MEDICATION(S) THE MORNING OF SURGERY WITH A SIP OF WATER: NONE                MANAGING PAIN AFTER SURGERY    We know you are probably wondering what your pain will be like after surgery.  Following surgery it is unrealistic to expect you will not have pain.   Pain is how our bodies let us know that something is wrong or cautions us to be careful.  That said, our goal is to make your pain tolerable.    Methods we may use to treat your pain include (oral or IV medications, PCAs, epidurals, nerve blocks, etc.)   While some procedures require IV pain medications for a short time after surgery, transitioning to pain medications by mouth allows for better management of pain.   Your nurse will encourage you to take oral pain medications whenever possible.  IV medications work almost immediately, but only last a short while.  Taking medications by mouth allows for a more constant level of medication in your blood stream for a longer period of time.      Once your pain is out of control it is harder to get back under control.  It is important you are aware when your next dose of pain medication is due.  If you are admitted, your nurse may write the time of your next dose on the white board in your room to help you remember.      We are interested in your pain and encourage you to inform us about aggravating factors during your visit.   Many times a simple repositioning every few hours can make a big difference.    If your physician says it is okay, do not let your pain prevent you from getting out of bed. Be sure to call your nurse for assistance prior to getting up so you do not fall.      Before surgery, please decide your tolerable pain goal.  These faces help describe the pain ratings we use  on a 0-10 scale.   Be prepared to tell us your goal and whether or not you take pain or anxiety medications at home.          BEFORE YOU COME TO THE HOSPITAL  (Pre-op instructions)  • Do not eat, drink, smoke or chew gum after midnight the night before surgery.  This also includes no mints.  • Morning of surgery take only the medicines you have been instructed with a sip of water unless otherwise instructed  by your physician.  • Do not shave, wear makeup or dark nail polish.  • Remove all jewelry including rings.  • Leave anything you consider valuable at home.  • Leave your suitcase in the car until after your surgery.  • Bring the following with you if applicable:  o Picture ID and insurance, Medicare or Medicaid cards  o Co-pay/deductible required by insurance (cash, check, credit card)  o Copy of advance directive, living will or power-of- documents if not brought to PAT  o CPAP or BIPAP mask and tubing  o Relaxation aids (MP3 player, book, magazine)  • On the day of surgery check in at registration located at the main entrance of the hospital.       Outpatient Surgery Guidelines, Adult  Outpatient procedures are those for which the person having the procedure is allowed to go home the same day as the procedure. Various procedures are done on an outpatient basis. You should follow some general guidelines if you will be having an outpatient procedure.  LET YOUR HEALTH CARE PROVIDER KNOW ABOUT:  · Any allergies you have.  · All medicines you are taking, including vitamins, herbs, eye drops, creams, and over-the-counter medicines.  · Previous problems you or members of your family have had with the use of anesthetics.  · Any blood disorders you have.  · Previous surgeries you have had.  · Medical conditions you have.  RISKS AND COMPLICATIONS  Your health care provider will discuss possible risks and complications with you before surgery. Common risks and complications include:    · Problems due to the use  of anesthetics.  · Blood loss and replacement (does not apply to minor surgical procedures).  · Temporary increase in pain due to surgery.  · Uncorrected pain or problems that the surgery was meant to correct.  · Infection.  · New damage.  BEFORE THE PROCEDURE  · Ask your health care provider about changing or stopping your regular medicines. You may need to stop taking certain medicines in the days or weeks before the procedure.  · Stop smoking at least 2 weeks before surgery. This lowers your risk for complications during and after surgery. Ask your health care provider for help with this if needed.  · Eat your usual meals and a light supper the day before surgery. Continue fluid intake. Do not drink alcohol.  · Do not eat or drink after midnight the night before your surgery.   · Arrange for someone to take you home and to stay with you for 24 hours after the procedure. Medicine given for your procedure may affect your ability to drive or to care for yourself.  · Call your health care provider's office if you develop an illness or problem that may prevent you from safely having your procedure.  AFTER THE PROCEDURE  After surgery, you will be taken to a recovery area, where your progress will be monitored. If there are no complications, you will be allowed to go home when you are awake, stable, and taking fluids well. You may have numbness around the surgical site. Healing will take some time. You will have tenderness at the surgical site and may have some swelling and bruising. You may also have some nausea.  HOME CARE INSTRUCTIONS  · Do not drive for 24 hours, or as directed by your health care provider. Do not drive while taking prescription pain medicines.  · Do not drink alcohol for 24 hours.  · Do not make important decisions or sign legal documents for 24 hours.  · You may resume a normal diet and activities as directed.  · Do not lift anything heavier than 10 pounds (4.5 kg) or play contact sports until  your health care provider says it is okay.  · Change your bandages (dressings) as directed.  · Only take over-the-counter or prescription medicines as directed by your health care provider.  · Follow up with your health care provider as directed.  SEEK MEDICAL CARE IF:  · You have increased bleeding (more than a small spot) from the surgical site.  · You have redness, swelling, or increasing pain in the wound.  · You see pus coming from the wound.  · You have a fever.  · You notice a bad smell coming from the wound or dressing.  · You feel lightheaded or faint.  · You develop a rash.  · You have trouble breathing.  · You develop allergies.  MAKE SURE YOU:  · Understand these instructions.  · Will watch your condition.  · Will get help right away if you are not doing well or get worse.     This information is not intended to replace advice given to you by your health care provider. Make sure you discuss any questions you have with your health care provider.     Document Released: 09/12/2002 Document Revised: 05/03/2016 Document Reviewed: 05/22/2014  [employer] Interactive Patient Education ©2016 [employer]       Fall Prevention in Hospitals, Adult  As a hospital patient, your condition and the treatments you receive can increase your risk for falls. Some additional risk factors for falls in a hospital include:  · Being in an unfamiliar environment.  · Being on bed rest.  · Your surgery.  · Taking certain medicines.  · Your tubing requirements, such as intravenous (IV) therapy or catheters.  It is important that you learn how to decrease fall risks while at the hospital. Below are important tips that can help prevent falls.  SAFETY TIPS FOR PREVENTING FALLS  Talk about your risk of falling.  · Ask your health care provider why you are at risk for falling. Is it your medicine, illness, tubing placement, or something else?  · Make a plan with your health care provider to keep you safe from falls.  · Ask your health  care provider or pharmacist about side effects of your medicines. Some medicines can make you dizzy or affect your coordination.  Ask for help.  · Ask for help before getting out of bed. You may need to press your call button.  · Ask for assistance in getting safely to the toilet.  · Ask for a walker or cane to be put at your bedside. Ask that most of the side rails on your bed be placed up before your health care provider leaves the room.  · Ask family or friends to sit with you.  · Ask for things that are out of your reach, such as your glasses, hearing aids, telephone, bedside table, or call button.  Follow these tips to avoid falling:  · Stay lying or seated, rather than standing, while waiting for help.  · Wear rubber-soled slippers or shoes whenever you walk in the hospital.  · Avoid quick, sudden movements.  ¨ Change positions slowly.  ¨ Sit on the side of your bed before standing.  ¨ Stand up slowly and wait before you start to walk.  · Let your health care provider know if there is a spill on the floor.  · Pay careful attention to the medical equipment, electrical cords, and tubes around you.  · When you need help, use your call button by your bed or in the bathroom. Wait for one of your health care providers to help you.  · If you feel dizzy or unsure of your footing, return to bed and wait for assistance.  · Avoid being distracted by the TV, telephone, or another person in your room.  · Do not lean or support yourself on rolling objects, such as IV poles or bedside tables.     This information is not intended to replace advice given to you by your health care provider. Make sure you discuss any questions you have with your health care provider.     Document Released: 12/15/2001 Document Revised: 01/08/2016 Document Reviewed: 08/25/2013  Elsevier Interactive Patient Education ©2016 [employer]       Surgical Site Infections FAQs  What is a Surgical Site Infection (SSI)?  A surgical site infection is an  infection that occurs after surgery in the part of the body where the surgery took place. Most patients who have surgery do not develop an infection. However, infections develop in about 1 to 3 out of every 100 patients who have surgery.  Some of the common symptoms of a surgical site infection are:  · Redness and pain around the area where you had surgery  · Drainage of cloudy fluid from your surgical wound  · Fever  Can SSIs be treated?  Yes. Most surgical site infections can be treated with antibiotics. The antibiotic given to you depends on the bacteria (germs) causing the infection. Sometimes patients with SSIs also need another surgery to treat the infection.  What are some of the things that hospitals are doing to prevent SSIs?  To prevent SSIs, doctors, nurses, and other healthcare providers:  · Clean their hands and arms up to their elbows with an antiseptic agent just before the surgery.  · Clean their hands with soap and water or an alcohol-based hand rub before and after caring for each patient.  · May remove some of your hair immediately before your surgery using electric clippers if the hair is in the same area where the procedure will occur. They should not shave you with a razor.  · Wear special hair covers, masks, gowns, and gloves during surgery to keep the surgery area clean.  · Give you antibiotics before your surgery starts. In most cases, you should get antibiotics within 60 minutes before the surgery starts and the antibiotics should be stopped within 24 hours after surgery.  · Clean the skin at the site of your surgery with a special soap that kills germs.  What can I do to help prevent SSIs?  Before your surgery:  · Tell your doctor about other medical problems you may have. Health problems such as allergies, diabetes, and obesity could affect your surgery and your treatment.  · Quit smoking. Patients who smoke get more infections. Talk to your doctor about how you can quit before your  surgery.  · Do not shave near where you will have surgery. Shaving with a razor can irritate your skin and make it easier to develop an infection.  At the time of your surgery:  · Speak up if someone tries to shave you with a razor before surgery. Ask why you need to be shaved and talk with your surgeon if you have any concerns.  · Ask if you will get antibiotics before surgery.  After your surgery:  · Make sure that your healthcare providers clean their hands before examining you, either with soap and water or an alcohol-based hand rub.  · If you do not see your providers clean their hands, please ask them to do so.  · Family and friends who visit you should not touch the surgical wound or dressings.  · Family and friends should clean their hands with soap and water or an alcohol-based hand rub before and after visiting you. If you do not see them clean their hands, ask them to clean their hands.  What do I need to do when I go home from the hospital?  · Before you go home, your doctor or nurse should explain everything you need to know about taking care of your wound. Make sure you understand how to care for your wound before you leave the hospital.  · Always clean your hands before and after caring for your wound.  · Before you go home, make sure you know who to contact if you have questions or problems after you get home.  · If you have any symptoms of an infection, such as redness and pain at the surgery site, drainage, or fever, call your doctor immediately.  If you have additional questions, please ask your doctor or nurse.  Developed and co-sponsored by The Society for Healthcare Epidemiology of [place] (SHEA); Infectious Diseases Society of [place] (IDSA); American Hospital Association; Association for Professionals in Infection Control and Epidemiology (APIC); Centers for Disease Control and Prevention (CDC); and The Joint Commission.     This information is not intended to replace advice given to you by  your health care provider. Make sure you discuss any questions you have with your health care provider.     Document Released: 12/23/2014 Document Revised: 01/08/2016 Document Reviewed: 03/02/2016  [employer] Interactive Patient Education ©2016 [employer] Inc.       [facility]  CHG 4% Patient Instruction Sheet    Preparing the Skin Before Surgery  Preparing or “prepping” skin before surgery can reduce the risk of infection at the surgical site. To make the process easier,[facility] has chosen 4% Chlorhexidine Gluconate (CHG) antiseptic solution.   The steps below outline the prepping process and should be carefully followed.                                                                                                                                                      Prep the skin at the following time(s):                                                      We recommend you shower the night before surgery, and again the morning of surgery with the 4% CHG antiseptic solution using half of the bottle and a cloth each time.  Dress in clean clothes/sleepwear after showering.  See instructions below for application.          Do not apply any lotions or moisturizers.       Do not shave the area to be prepped for at least 2 days prior to surgery.    Clipping the hair may be done immediately prior to your surgery at the hospital    if needed.    Directions:  Thoroughly rinse your body with water.  Apply 4% CHG to a cloth and wash skin gently, paying special attention to the operative site.  Rinse again thoroughly.  Once you have begun using this product do not apply anything else to your skin. If itching or redness persists, rinse affected areas and discontinue use.    When using this product:  • Keep out of eyes, ears, and mouth.  • If solution should contact these areas, rinse out promptly and thoroughly with water.  • For external use only.  • Do not use in genital area, on your face or  head.      PATIENT/FAMILY/RESPONSIBLE PARTY VERBALIZES UNDERSTANDING OF ABOVE EDUCATION.  COPY OF PAIN SCALE GIVEN AND REVIEWED WITH VERBALIZED UNDERSTANDING.

## 2018-12-17 ENCOUNTER — HOSPITAL ENCOUNTER (OUTPATIENT)
Facility: HOSPITAL | Age: 73
Setting detail: HOSPITAL OUTPATIENT SURGERY
Discharge: HOME | End: 2018-12-17
Attending: SURGERY | Admitting: SURGERY

## 2018-12-17 ENCOUNTER — ANESTHESIA EVENT (OUTPATIENT)
Dept: PERIOP | Facility: HOSPITAL | Age: 73
End: 2018-12-17

## 2018-12-17 ENCOUNTER — APPOINTMENT (OUTPATIENT)
Dept: ULTRASOUND IMAGING | Facility: HOSPITAL | Age: 73
End: 2018-12-17

## 2018-12-17 ENCOUNTER — ANESTHESIA (OUTPATIENT)
Dept: PERIOP | Facility: HOSPITAL | Age: 73
End: 2018-12-17

## 2018-12-17 VITALS
TEMPERATURE: 96.6 F | DIASTOLIC BLOOD PRESSURE: 55 MMHG | HEART RATE: 73 BPM | RESPIRATION RATE: 14 BRPM | SYSTOLIC BLOOD PRESSURE: 150 MMHG | OXYGEN SATURATION: 97 %

## 2018-12-17 DIAGNOSIS — Z01.818 PREOP TESTING: ICD-10-CM

## 2018-12-17 DIAGNOSIS — I87.393 CHRONIC VENOUS HYPERTENSION (IDIOPATHIC) WITH OTHER COMPLICATIONS OF BILATERAL LOWER EXTREMITY: ICD-10-CM

## 2018-12-17 DIAGNOSIS — I87.2 VENOUS (PERIPHERAL) INSUFFICIENCY: ICD-10-CM

## 2018-12-17 PROCEDURE — C1894 INTRO/SHEATH, NON-LASER: HCPCS | Performed by: SURGERY

## 2018-12-17 PROCEDURE — 86900 BLOOD TYPING SEROLOGIC ABO: CPT | Performed by: NURSE PRACTITIONER

## 2018-12-17 PROCEDURE — 25010000002 PROPOFOL 1000 MG/ML EMULSION: Performed by: NURSE ANESTHETIST, CERTIFIED REGISTERED

## 2018-12-17 PROCEDURE — 86850 RBC ANTIBODY SCREEN: CPT | Performed by: NURSE PRACTITIONER

## 2018-12-17 PROCEDURE — 86901 BLOOD TYPING SEROLOGIC RH(D): CPT | Performed by: NURSE PRACTITIONER

## 2018-12-17 PROCEDURE — C1888 ENDOVAS NON-CARDIAC ABL CATH: HCPCS | Performed by: SURGERY

## 2018-12-17 PROCEDURE — 25010000002 ONDANSETRON PER 1 MG: Performed by: NURSE ANESTHETIST, CERTIFIED REGISTERED

## 2018-12-17 PROCEDURE — 25010000002 ONDANSETRON PER 1 MG: Performed by: SURGERY

## 2018-12-17 PROCEDURE — 76937 US GUIDE VASCULAR ACCESS: CPT

## 2018-12-17 PROCEDURE — 25010000002 KETOROLAC TROMETHAMINE PER 15 MG: Performed by: NURSE ANESTHETIST, CERTIFIED REGISTERED

## 2018-12-17 PROCEDURE — 25010000002 VANCOMYCIN PER 500 MG: Performed by: NURSE PRACTITIONER

## 2018-12-17 PROCEDURE — 36475 ENDOVENOUS RF 1ST VEIN: CPT | Performed by: SURGERY

## 2018-12-17 RX ORDER — FENTANYL CITRATE 50 UG/ML
25 INJECTION, SOLUTION INTRAMUSCULAR; INTRAVENOUS AS NEEDED
Status: CANCELLED | OUTPATIENT
Start: 2018-12-17

## 2018-12-17 RX ORDER — MIDAZOLAM HYDROCHLORIDE 1 MG/ML
1 INJECTION INTRAMUSCULAR; INTRAVENOUS
Status: DISCONTINUED | OUTPATIENT
Start: 2018-12-17 | End: 2018-12-17 | Stop reason: HOSPADM

## 2018-12-17 RX ORDER — SODIUM CHLORIDE 0.9 % (FLUSH) 0.9 %
1-10 SYRINGE (ML) INJECTION AS NEEDED
Status: DISCONTINUED | OUTPATIENT
Start: 2018-12-17 | End: 2018-12-17 | Stop reason: HOSPADM

## 2018-12-17 RX ORDER — SODIUM CHLORIDE 0.9 % (FLUSH) 0.9 %
3 SYRINGE (ML) INJECTION AS NEEDED
Status: DISCONTINUED | OUTPATIENT
Start: 2018-12-17 | End: 2018-12-17 | Stop reason: HOSPADM

## 2018-12-17 RX ORDER — LIDOCAINE HYDROCHLORIDE 20 MG/ML
INJECTION, SOLUTION INFILTRATION; PERINEURAL AS NEEDED
Status: DISCONTINUED | OUTPATIENT
Start: 2018-12-17 | End: 2018-12-17 | Stop reason: SURG

## 2018-12-17 RX ORDER — METOCLOPRAMIDE HYDROCHLORIDE 5 MG/ML
5 INJECTION INTRAMUSCULAR; INTRAVENOUS
Status: CANCELLED | OUTPATIENT
Start: 2018-12-17

## 2018-12-17 RX ORDER — OXYCODONE AND ACETAMINOPHEN 7.5; 325 MG/1; MG/1
2 TABLET ORAL EVERY 4 HOURS PRN
Status: CANCELLED | OUTPATIENT
Start: 2018-12-17 | End: 2018-12-27

## 2018-12-17 RX ORDER — MIDAZOLAM HYDROCHLORIDE 1 MG/ML
2 INJECTION INTRAMUSCULAR; INTRAVENOUS
Status: DISCONTINUED | OUTPATIENT
Start: 2018-12-17 | End: 2018-12-17 | Stop reason: HOSPADM

## 2018-12-17 RX ORDER — ONDANSETRON 2 MG/ML
4 INJECTION INTRAMUSCULAR; INTRAVENOUS ONCE AS NEEDED
Status: COMPLETED | OUTPATIENT
Start: 2018-12-17 | End: 2018-12-17

## 2018-12-17 RX ORDER — IPRATROPIUM BROMIDE AND ALBUTEROL SULFATE 2.5; .5 MG/3ML; MG/3ML
3 SOLUTION RESPIRATORY (INHALATION) ONCE AS NEEDED
Status: CANCELLED | OUTPATIENT
Start: 2018-12-17

## 2018-12-17 RX ORDER — ONDANSETRON 2 MG/ML
INJECTION INTRAMUSCULAR; INTRAVENOUS AS NEEDED
Status: DISCONTINUED | OUTPATIENT
Start: 2018-12-17 | End: 2018-12-17 | Stop reason: SURG

## 2018-12-17 RX ORDER — SODIUM CHLORIDE 9 MG/ML
INJECTION, SOLUTION INTRAVENOUS AS NEEDED
Status: DISCONTINUED | OUTPATIENT
Start: 2018-12-17 | End: 2018-12-17 | Stop reason: HOSPADM

## 2018-12-17 RX ORDER — OXYCODONE HYDROCHLORIDE AND ACETAMINOPHEN 5; 325 MG/1; MG/1
1 TABLET ORAL ONCE AS NEEDED
Status: DISCONTINUED | OUTPATIENT
Start: 2018-12-17 | End: 2018-12-17 | Stop reason: HOSPADM

## 2018-12-17 RX ORDER — SODIUM CHLORIDE, SODIUM LACTATE, POTASSIUM CHLORIDE, CALCIUM CHLORIDE 600; 310; 30; 20 MG/100ML; MG/100ML; MG/100ML; MG/100ML
100 INJECTION, SOLUTION INTRAVENOUS CONTINUOUS
Status: DISCONTINUED | OUTPATIENT
Start: 2018-12-17 | End: 2018-12-17 | Stop reason: HOSPADM

## 2018-12-17 RX ORDER — SODIUM CHLORIDE 0.9 % (FLUSH) 0.9 %
3 SYRINGE (ML) INJECTION EVERY 12 HOURS SCHEDULED
Status: DISCONTINUED | OUTPATIENT
Start: 2018-12-17 | End: 2018-12-17 | Stop reason: HOSPADM

## 2018-12-17 RX ORDER — IBUPROFEN 600 MG/1
600 TABLET ORAL ONCE AS NEEDED
Status: CANCELLED | OUTPATIENT
Start: 2018-12-17

## 2018-12-17 RX ORDER — ONDANSETRON 2 MG/ML
4 INJECTION INTRAMUSCULAR; INTRAVENOUS ONCE AS NEEDED
Status: CANCELLED | OUTPATIENT
Start: 2018-12-17

## 2018-12-17 RX ORDER — MEPERIDINE HYDROCHLORIDE 25 MG/ML
12.5 INJECTION INTRAMUSCULAR; INTRAVENOUS; SUBCUTANEOUS
Status: CANCELLED | OUTPATIENT
Start: 2018-12-17 | End: 2018-12-18

## 2018-12-17 RX ORDER — ACETAMINOPHEN 500 MG
1000 TABLET ORAL ONCE
Status: DISCONTINUED | OUTPATIENT
Start: 2018-12-17 | End: 2018-12-17

## 2018-12-17 RX ORDER — NALOXONE HCL 0.4 MG/ML
0.4 VIAL (ML) INJECTION AS NEEDED
Status: CANCELLED | OUTPATIENT
Start: 2018-12-17

## 2018-12-17 RX ORDER — OXYCODONE AND ACETAMINOPHEN 10; 325 MG/1; MG/1
1 TABLET ORAL ONCE AS NEEDED
Status: CANCELLED | OUTPATIENT
Start: 2018-12-17

## 2018-12-17 RX ORDER — KETOROLAC TROMETHAMINE 30 MG/ML
INJECTION, SOLUTION INTRAMUSCULAR; INTRAVENOUS AS NEEDED
Status: DISCONTINUED | OUTPATIENT
Start: 2018-12-17 | End: 2018-12-17 | Stop reason: SURG

## 2018-12-17 RX ORDER — SODIUM CHLORIDE, SODIUM LACTATE, POTASSIUM CHLORIDE, CALCIUM CHLORIDE 600; 310; 30; 20 MG/100ML; MG/100ML; MG/100ML; MG/100ML
1000 INJECTION, SOLUTION INTRAVENOUS CONTINUOUS
Status: DISCONTINUED | OUTPATIENT
Start: 2018-12-17 | End: 2018-12-17 | Stop reason: HOSPADM

## 2018-12-17 RX ORDER — LABETALOL HYDROCHLORIDE 5 MG/ML
5 INJECTION, SOLUTION INTRAVENOUS
Status: CANCELLED | OUTPATIENT
Start: 2018-12-17

## 2018-12-17 RX ADMIN — ONDANSETRON HYDROCHLORIDE 4 MG: 2 SOLUTION INTRAMUSCULAR; INTRAVENOUS at 08:55

## 2018-12-17 RX ADMIN — VANCOMYCIN HYDROCHLORIDE 1000 MG: 1 INJECTION, POWDER, LYOPHILIZED, FOR SOLUTION INTRAVENOUS at 07:34

## 2018-12-17 RX ADMIN — PROPOFOL 150 MCG/KG/MIN: 10 INJECTION, EMULSION INTRAVENOUS at 08:00

## 2018-12-17 RX ADMIN — KETOROLAC TROMETHAMINE 30 MG: 30 INJECTION, SOLUTION INTRAMUSCULAR at 08:56

## 2018-12-17 RX ADMIN — OXYCODONE HYDROCHLORIDE AND ACETAMINOPHEN 1 TABLET: 5; 325 TABLET ORAL at 10:48

## 2018-12-17 RX ADMIN — SODIUM CHLORIDE, POTASSIUM CHLORIDE, SODIUM LACTATE AND CALCIUM CHLORIDE 100 ML/HR: 600; 310; 30; 20 INJECTION, SOLUTION INTRAVENOUS at 06:59

## 2018-12-17 RX ADMIN — ONDANSETRON 4 MG: 2 SOLUTION INTRAMUSCULAR; INTRAVENOUS at 10:48

## 2018-12-17 RX ADMIN — LIDOCAINE HYDROCHLORIDE 100 MG: 20 INJECTION, SOLUTION INFILTRATION; PERINEURAL at 08:00

## 2018-12-17 NOTE — DISCHARGE INSTRUCTIONS
YOUR NEXT PAIN MEDICATION IS DUE AT______________        Moderate Conscious Sedation, Adult, Care After  Refer to this sheet in the next few weeks. These instructions provide you with information on caring for yourself after your procedure. Your health care provider may also give you more specific instructions. Your treatment has been planned according to current medical practices, but problems sometimes occur. Call your health care provider if you have any problems or questions after your procedure.  WHAT TO EXPECT AFTER THE PROCEDURE    After your procedure:  · You may feel sleepy, clumsy, and have poor balance for several hours.  · Vomiting may occur if you eat too soon after the procedure.  HOME CARE INSTRUCTIONS  · Do not participate in any activities where you could become injured for at least 24 hours. Do not:  ¨ Drive.  ¨ Swim.  ¨ Ride a bicycle.  ¨ Operate heavy machinery.  ¨ Cook.  ¨ Use power tools.  ¨ Climb ladders.  ¨ Work from a high place.  · Do not make important decisions or sign legal documents until you are improved.  · If you vomit, drink water, juice, or soup when you can drink without vomiting. Make sure you have little or no nausea before eating solid foods.  · Only take over-the-counter or prescription medicines for pain, discomfort, or fever as directed by your health care provider.  · Make sure you and your family fully understand everything about the medicines given to you, including what side effects may occur.  · You should not drink alcohol, take sleeping pills, or take medicines that cause drowsiness for at least 24 hours.  · If you smoke, do not smoke without supervision.  · If you are feeling better, you may resume normal activities 24 hours after you were sedated.  · Keep all appointments with your health care provider.  SEEK MEDICAL CARE IF:  · Your skin is pale or bluish in color.  · You continue to feel nauseous or vomit.  · Your pain is getting worse and is not helped by  medicine.  · You have bleeding or swelling.  · You are still sleepy or feeling clumsy after 24 hours.  SEEK IMMEDIATE MEDICAL CARE IF:  · You develop a rash.  · You have difficulty breathing.  · You develop any type of allergic problem.  · You have a fever.  MAKE SURE YOU:  · Understand these instructions.  · Will watch your condition.  · Will get help right away if you are not doing well or get worse.     This information is not intended to replace advice given to you by your health care provider. Make sure you discuss any questions you have with your health care provider.     Document Released: 10/08/2014 Document Revised: 01/08/2016 Document Reviewed: 10/08/2014  [employer] Interactive Patient Education ©2016 Elsevier Inc.         CALL YOUR PHYSICIAN IF YOU EXPERIENCE  INCREASED PAIN NOT HELPED BY YOUR PAIN MEDICATION.        Fall Prevention in the Home      Falls can cause injuries. They can happen to people of all ages. There are many things you can do to make your home safe and to help prevent falls.    WHAT CAN I DO ON THE OUTSIDE OF MY HOME?  · Regularly fix the edges of walkways and driveways and fix any cracks.  · Remove anything that might make you trip as you walk through a door, such as a raised step or threshold.  · Trim any bushes or trees on the path to your home.  · Use bright outdoor lighting.  · Clear any walking paths of anything that might make someone trip, such as rocks or tools.  · Regularly check to see if handrails are loose or broken. Make sure that both sides of any steps have handrails.  · Any raised decks and porches should have guardrails on the edges.  · Have any leaves, snow, or ice cleared regularly.  · Use sand or salt on walking paths during winter.  · Clean up any spills in your garage right away. This includes oil or grease spills.  WHAT CAN I DO IN THE BATHROOM?    · Use night lights.  · Install grab bars by the toilet and in the tub and shower. Do not use towel bars as grab  bars.  · Use non-skid mats or decals in the tub or shower.  · If you need to sit down in the shower, use a plastic, non-slip stool.  · Keep the floor dry. Clean up any water that spills on the floor as soon as it happens.  · Remove soap buildup in the tub or shower regularly.  · Attach bath mats securely with double-sided non-slip rug tape.  · Do not have throw rugs and other things on the floor that can make you trip.  WHAT CAN I DO IN THE BEDROOM?  · Use night lights.  · Make sure that you have a light by your bed that is easy to reach.  · Do not use any sheets or blankets that are too big for your bed. They should not hang down onto the floor.  · Have a firm chair that has side arms. You can use this for support while you get dressed.  · Do not have throw rugs and other things on the floor that can make you trip.  WHAT CAN I DO IN THE KITCHEN?  · Clean up any spills right away.  · Avoid walking on wet floors.  · Keep items that you use a lot in easy-to-reach places.  · If you need to reach something above you, use a strong step stool that has a grab bar.  · Keep electrical cords out of the way.  · Do not use floor polish or wax that makes floors slippery. If you must use wax, use non-skid floor wax.  · Do not have throw rugs and other things on the floor that can make you trip.  WHAT CAN I DO WITH MY STAIRS?  · Do not leave any items on the stairs.  · Make sure that there are handrails on both sides of the stairs and use them. Fix handrails that are broken or loose. Make sure that handrails are as long as the stairways.  · Check any carpeting to make sure that it is firmly attached to the stairs. Fix any carpet that is loose or worn.  · Avoid having throw rugs at the top or bottom of the stairs. If you do have throw rugs, attach them to the floor with carpet tape.  · Make sure that you have a light switch at the top of the stairs and the bottom of the stairs. If you do not have them, ask someone to add them for  you.  WHAT ELSE CAN I DO TO HELP PREVENT FALLS?  · Wear shoes that:  ¨ Do not have high heels.  ¨ Have rubber bottoms.  ¨ Are comfortable and fit you well.  ¨ Are closed at the toe. Do not wear sandals.  · If you use a stepladder:  ¨ Make sure that it is fully opened. Do not climb a closed stepladder.  ¨ Make sure that both sides of the stepladder are locked into place.  ¨ Ask someone to hold it for you, if possible.  · Clearly marlon and make sure that you can see:  ¨ Any grab bars or handrails.  ¨ First and last steps.  ¨ Where the edge of each step is.  · Use tools that help you move around (mobility aids) if they are needed. These include:  ¨ Canes.  ¨ Walkers.  ¨ Scooters.  ¨ Crutches.  · Turn on the lights when you go into a dark area. Replace any light bulbs as soon as they burn out.  · Set up your furniture so you have a clear path. Avoid moving your furniture around.  · If any of your floors are uneven, fix them.  · If there are any pets around you, be aware of where they are.  · Review your medicines with your doctor. Some medicines can make you feel dizzy. This can increase your chance of falling.  Ask your doctor what other things that you can do to help prevent falls.     This information is not intended to replace advice given to you by your health care provider. Make sure you discuss any questions you have with your health care provider.     Document Released: 10/14/2010 Document Revised: 05/03/2016 Document Reviewed: 01/22/2016  Elsevier Interactive Patient Education ©2016 [employer]     PATIENT/FAMILY/RESPONSIBLE PARTY VERBALIZES UNDERSTANDING OF ABOVE EDUCATION.  COPY OF PAIN SCALE GIVEN AND REVIEWED WITH VERBALIZED UNDERSTANDING.

## 2018-12-17 NOTE — OP NOTE
Dorcas Juarez  12/17/2018     PREOPERATIVE DIAGNOSIS: Venous (peripheral) insufficiency [I87.2]  Preop testing [Z01.818]     POSTOPERATIVE DIAGNOSIS: Post-Op Diagnosis Codes:     * Venous (peripheral) insufficiency [I87.2]     * Preop testing [Z01.818]     PROCEDURE PERFORMED:   1.  Ultrasound-guided cannulation of the right lower extremity greater saphenous vein  2.  Radio frequency ablation of the right lower extremity greater saphenous vein  3.  Attempted cannulation of the right lower extremity anterior accessory saphenous vein     SURGEON: Rolando Anguiano DO      ANESTHESIA: Mac    PREPARATION: Routine.    STAFF: Circulator: Mila Urban RN  Scrub Person: Denys Xie; Tash Colorado  Assistant: Elisha Paiz  Vascular Ultrasound Technician: Nic Yung    ESTIMATED BLOOD LOSS: 5 ML    SPECIMENS: None    COMPLICATIONS: None    INDICATIONS: Dorcas Juarez is a 73 y.o. female who previously underwent left greater saphenous vein stripping or ablation of the left lower extremity many years ago.  She has complaints of  Aching and burning.  She also has complaints of severe cramping at night when she sleeps.  She does have compression stockings and wears them without relief.  She did have noninvasive testing performed today, which I did review in office. The indications, risks, and possible complications of the procedure were explained to the patient, who voiced understanding and wished to proceed with surgery.     PROCEDURE IN DETAIL:   The patient was taken to the operating room and placed on the operating table in a supine position. After Mac anesthesia was obtained, the right lower extremity was prepped and draped in a sterile manner.  Under ultrasound guidance and using a micropuncture technique the right lower extremity greater saphenous vein was cannulated and a microwire was placed.  This was confirmed under ultrasound.  A small stab incision was made with 11 blade and a 7 Yoruba  sheath was placed.  The patient was placed in Trendelenburg position and the saphenofemoral junction was identified under ultrasound.  The catheter was placed up to the junction and pulled back 3 cm and marked.  Next, tumescent fluid was instilled along the entire length of the vein under ultrasound guidance.  Once sufficient tumescent fluid was placed radio frequency ablation had commenced.  There was a total of 6 RF cycles for a total treatment length of 33 cm for a total treatment time of 2 minutes.  There was an average of 15 W at an average temperature 120°C.  Upon completion of the ablation the catheter and sheath were removed.  Direct pressure was held for an additional 5-10 minutes of ensure hemostasis.  An Ace wrap was placed from the toes to the groin.  Sterile dressings were applied. The patient tolerated the procedure well. Sponge and needle counts were correct. The patient was then awakened and transferred to the outpatient center in stable condition.  Rolando Anguiano,   Date: 12/17/2018 Time: 9:03 AM     CC:Hair Echols MD

## 2018-12-17 NOTE — ANESTHESIA POSTPROCEDURE EVALUATION
Patient: Dorcas Juarez    Procedure Summary     Date:  12/17/18 Room / Location:  [facility] OR  / [facility] HYBRID OR 12    Anesthesia Start:  0758 Anesthesia Stop:  0911    Procedure:  RIGHT SAPHENOUS VEIN RADIO FREQUENCY ABLATION and anterior accessory branch (Right Leg Lower) Diagnosis:       Venous (peripheral) insufficiency      Preop testing      (Venous (peripheral) insufficiency [I87.2])      (Preop testing [Z01.818])    Surgeon:  Rolando Anguiano DO Provider:  BRYCE Lund CRNA    Anesthesia Type:  general ASA Status:  2          Anesthesia Type: general  Last vitals  BP   158/69 (12/17/18 0930)   Temp   96.6 °F (35.9 °C) (12/17/18 0920)   Pulse   71 (12/17/18 0930)   Resp   11 (12/17/18 0920)     SpO2   98 % (12/17/18 0930)     Post Anesthesia Care and Evaluation    Patient location during evaluation: PACU  Patient participation: complete - patient participated  Level of consciousness: awake and alert  Pain management: adequate  Airway patency: patent  Anesthetic complications: No anesthetic complications    Cardiovascular status: acceptable  Respiratory status: acceptable  Hydration status: acceptable    Comments: Blood pressure 158/69, pulse 71, temperature 96.6 °F (35.9 °C), temperature source Temporal, resp. rate 11, SpO2 98 %.    Pt discharged from PACU based on mini score >8

## 2018-12-18 ENCOUNTER — TELEPHONE (OUTPATIENT)
Dept: PODIATRY | Facility: CLINIC | Age: 73
End: 2018-12-18

## 2018-12-18 NOTE — TELEPHONE ENCOUNTER
Patient called and stated that she is experiencing cramping in her right left. She stated that it starts in her foot and goes up her leg. The cramping started approximately two to three hours ago. She said when the cramp occurs, it doesn't last long but she is concerned. Patient had right saphenous vein radio frequency ablation on 12/17/2018 by Dr. Anguiano.

## 2018-12-19 ENCOUNTER — TELEPHONE (OUTPATIENT)
Dept: VASCULAR SURGERY | Facility: CLINIC | Age: 73
End: 2018-12-19

## 2018-12-19 NOTE — TELEPHONE ENCOUNTER
Mrs Juarez called in and stated that she had taken a shower and put on her compression stockings. After putting on her stockings she stated her right foot started to tingle and little and got a cramp in it. Mrs Juarez stated it didn't last very long but it did happen 3 times today. Mrs Juarez had RFA 12/17/18 she states there isn't any pain, redness, swelling to the areas just the cramp to her right foot.     Spoke with Dr Anguiano and Stacy ROJAS they advised it was okay as it has only been one day since the procedure that if she has any other problems to give the office a call.     Spoke with Mrs Juarez letting her know it should be okay but if she was to experience any other pain or problems to give the office a call. Mrs Juarez verbalized understanding.

## 2018-12-26 ENCOUNTER — HOSPITAL ENCOUNTER (OUTPATIENT)
Dept: ULTRASOUND IMAGING | Facility: HOSPITAL | Age: 73
Discharge: HOME | End: 2018-12-26
Attending: SURGERY | Admitting: SURGERY

## 2018-12-26 DIAGNOSIS — I87.393 CHRONIC VENOUS HYPERTENSION (IDIOPATHIC) WITH OTHER COMPLICATIONS OF BILATERAL LOWER EXTREMITY: ICD-10-CM

## 2018-12-26 DIAGNOSIS — I87.2 VENOUS (PERIPHERAL) INSUFFICIENCY: ICD-10-CM

## 2018-12-26 PROCEDURE — 93971 EXTREMITY STUDY: CPT | Performed by: SURGERY

## 2018-12-26 PROCEDURE — 93971 EXTREMITY STUDY: CPT

## 2018-12-31 ENCOUNTER — TELEPHONE (OUTPATIENT)
Dept: VASCULAR SURGERY | Facility: CLINIC | Age: 73
End: 2018-12-31

## 2019-01-03 ENCOUNTER — TELEPHONE (OUTPATIENT)
Dept: VASCULAR SURGERY | Facility: CLINIC | Age: 74
End: 2019-01-03

## 2019-01-03 NOTE — TELEPHONE ENCOUNTER
Spoke with patient daughter and confirmed that patient appointment was tomorrow with Stacy Santos.

## 2019-01-03 NOTE — TELEPHONE ENCOUNTER
Spoke with Mrs Juarez reminding her of her appointment for Friday, January 4th, 2019 at 1030 am with Stacy ROJAS. Mrs Juarez confirmed she would be here.    Test Completed [id number]

## 2019-01-04 ENCOUNTER — OFFICE VISIT (OUTPATIENT)
Dept: VASCULAR SURGERY | Facility: CLINIC | Age: 74
End: 2019-01-04

## 2019-01-04 VITALS
WEIGHT: 132 LBS | HEIGHT: 63 IN | HEART RATE: 75 BPM | OXYGEN SATURATION: 94 % | SYSTOLIC BLOOD PRESSURE: 122 MMHG | BODY MASS INDEX: 23.39 KG/M2 | DIASTOLIC BLOOD PRESSURE: 76 MMHG

## 2019-01-04 DIAGNOSIS — R25.2 MUSCLE CRAMPING: ICD-10-CM

## 2019-01-04 DIAGNOSIS — I87.2 VENOUS INSUFFICIENCY: Primary | ICD-10-CM

## 2019-01-04 DIAGNOSIS — I10 ESSENTIAL HYPERTENSION: ICD-10-CM

## 2019-01-04 PROBLEM — Z01.818 PREOP TESTING: Status: RESOLVED | Noted: 2018-12-10 | Resolved: 2019-01-04

## 2019-01-04 PROCEDURE — 99213 OFFICE O/P EST LOW 20 MIN: CPT | Performed by: NURSE PRACTITIONER

## 2019-01-04 NOTE — PROGRESS NOTES
"1/4/2019    Hair Echols MD  [address] [facility] [address]  [facility] [address]    Dorcas Juarez  1945    Chief Complaint   Patient presents with   • Follow-up     1 Week Follow Up For Venous (Peripheral) Insufficiency. Test 12/26/18 US pad venous lower ext right. Patient denies any stroke like symptoms.        Dear Hair Echols MD        HPI  I had the pleasure of seeing your patient Dorcas Juarez in the office today.  As you recall, Dorcas Juarez is a 73 y.o.  female who has previously been seen by office by Dr. Ibrahim.  She was last seen in February 2015.  Per notes she underwent left greater saphenous vein stripping or ablation by Dr. Rodriguez and EVLT of tributaries by Dr. Ibrahim.  She has complaints of  Aching and burning.  She also has complaints of severe cramping at night when she sleeps.  She reports cramping has completely decreased in her right lower extremity status post recent radiofrequency ablation on 12/17/2018.  She complains of severe cramping to her left lower extremity and is also having cramping and other muscles even in her abdomen.  She does have compression stockings and wears them without relief.  She did have noninvasive testing performed today, which I did review in office.       Review of Systems   Constitutional: Negative.    HENT: Negative.    Eyes: Negative.    Respiratory: Negative.    Cardiovascular: Negative.         Bilateral leg pain, severe cramping   Gastrointestinal: Negative.         Muscle cramping and abdomen   Endocrine: Negative.    Genitourinary: Negative.    Musculoskeletal: Negative.    Skin: Negative.    Allergic/Immunologic: Negative.    Hematological: Negative.    Psychiatric/Behavioral: Negative.        /76 (BP Location: Right arm, Patient Position: Sitting, Cuff Size: Adult)   Pulse 75   Ht 160 cm (63\")   Wt 59.9 kg (132 lb)   SpO2 94%   BMI 23.38 kg/m²   Physical Exam   Constitutional: She is oriented to person, place, and time. She appears " well-developed and well-nourished. No distress.   HENT:   Head: Normocephalic and atraumatic.   Mouth/Throat: Oropharynx is clear and moist.   Eyes: Pupils are equal, round, and reactive to light. No scleral icterus.   Neck: Normal range of motion. Neck supple. No JVD present. Carotid bruit is not present. No thyromegaly present.   Cardiovascular: Normal rate, regular rhythm, S2 normal, normal heart sounds, intact distal pulses and normal pulses. Exam reveals no gallop and no friction rub.   No murmur heard.  Varicose veins to bilateral lower extremities   Pulmonary/Chest: Effort normal and breath sounds normal.   Abdominal: Soft. Normal aorta and bowel sounds are normal. There is no hepatosplenomegaly.   Musculoskeletal: Normal range of motion.   Neurological: She is alert and oriented to person, place, and time. No cranial nerve deficit.   Skin: Skin is warm and dry. She is not diaphoretic.   Psychiatric: She has a normal mood and affect. Her behavior is normal. Judgment and thought content normal.   Nursing note and vitals reviewed.      Diagnostic Data:  Xr Chest 2 Vw    Result Date: 12/12/2018  Narrative: EXAMINATION: XR CHEST 2 VW-  12/12/2018 1:20 PM CST  TWO-VIEW CHEST:  HISTORY: Hypertension  Frontal and lateral projection chest radiograph obtained.  COMPARISON:  7/30/2018  FINDINGS:  COPD changes noted.  The lungs are clear without acute infiltrates  The heart is normal in size, pulmonary circulation appropriate, without heart failure.  There are mild scoliotic changes in the thoracic spine with spondylosis.  Radiographically, the chest is unchanged.                                                                                                                  Impression: 1. No acute cardiopulmonary process.   This report was finalized on 12/12/2018 13:20 by Dr. Akil Paiz MD.    Us Guided Vascular Access    Result Date: 12/17/2018  Narrative: History: Limited right lower extremity venous duplex was  performed at the time of right greater saphenous vein radiofrequency ablation.  Comment: Limited right lower extremity venous duplex was performed with grayscale imaging color flow Doppler.  FINDINGS: The right greater saphenous vein was successfully accessed under ultrasound guidance and radial frequency ablation catheter was advanced with tip positioned 3 cm distal to the saphenofemoral junction.      Impression: Successful access and radiofrequency ablation catheter during right lower extremity greater saphenous vein radiofrequency ablation. This report was finalized on 12/17/2018 13:03 by Dr. Rodrigo Valadez MD.    Us Venous Doppler Lower Extremity Right (duplex)    Result Date: 12/26/2018  Narrative: History: Swelling      Impression: Impression: 1. There is no evidence of deep venous thrombosis of the right lower extremity. 2. The greater saphenous vein is closed from zones 2 through 6.  Comments: Right lower extremity venous duplex exam was performed using color Doppler flow, Doppler wave form analysis, and grayscale imaging, with and without compression. There is no evidence of deep venous thrombosis of the common femoral, superficial femoral, popliteal, posterior tibial, and peroneal veins. There is no thrombus identified in the saphenofemoral junction. There is no evidence of clot in the left common femoral vein.  This report was finalized on 12/26/2018 16:07 by Dr. Rolando Anguiano MD.    Us Venous Doppler Lower Extremity Bilateral (duplex)    Result Date: 12/7/2018  Narrative: History: Bilateral lower extremity swelling  Comment: Venous valvular insufficiency testing was performed in both legs using duplex ultrasound with rapid cuff deflation technique.  The common femoral veins, popliteal veins, posterior tibial veins, greater saphenous veins, and lesser saphenous veins were interrogated bilaterally.   On the right, the greater saphenous vein at the junction measures 5.7 mm. In the mid thigh measures 4.4  mm. Above the knee measures 3.4 mm. The mid calf measures 1.5mm. The ankle measures 2.0 mm. There is greater than 0.5 seconds of reflux from the saphenofemoral junction to the ankle. Proximally there is also an anterior branch that extends from zone 1 to zone 3 that measures 3.3 mm and has greater than 0.5 seconds of reflux noted. Lesser saphenous vein is 2.0mm at the knee, 3.2 mm at the mid calf, and 2.8 mm at the ankle. There is greater than 0.5 seconds of reflux from mid calf to the ankle. There is no evidence of DVT.  On the left, the greater saphenous vein has been stripped from the saphenofemoral junction to the above knee level.. The mid calf measures 3.8 mm. The ankle measured 2.4 mm. There is a large varicose vein in the proximal to mid thigh. There is also a  noted at the proximal calf which measures 4.9 mm and has 0.6 seconds of reflux. There is greater than 0.5 seconds of reflux from the midcalf to the ankle. Lesser saphenous vein is 1.5mm at the knee, 1.2 mm at the mid calf, and 3.1 mm at the ankle. There is no evidence of significant reflux.. There is no evidence of DVT.      Impression: Significant reflux in the bilateral lower extremity greater saphenous veins as noted above. There is also significant reflux noted in the right lower extremity lesser saphenous vein. There is no evidence of reflux in the left lower extremity lesser saphenous vein. There is no evidence of DVT in the bilateral lower extremities.  This report was finalized on 12/07/2018 15:12 by Dr. Rodrigo Valadez MD.       Patient Active Problem List   Diagnosis   • Hypertension   • Venous (peripheral) insufficiency         ICD-10-CM ICD-9-CM   1. Venous insufficiency I87.2 459.81   2. Essential hypertension I10 401.9   3. Muscle cramping R25.2 729.82         Plan: After thoroughly evaluating Dorcas Juarez, I am pleased to report she is doing well status post right lower extremity radiofrequency ablation of the greater  saphenous vein and anterior accessory branch.  She reports her cramping to her right lower extremity is significantly decreased.  She does still complain of severe cramping to her left lower extremity as well as muscle spasms in her abdomen.  In the meantime, she can continue continue to wear knee-high compression stockings in the 20-30 mm pressure gradient range.   We instructed her on how to wear the stockings on a daily basis and to keep her legs elevated when she is not on them.  I did discuss staying well-hydrated, taking a multivitamin, possibly checking with PCP regarding labs and level.  Will discuss if anything further is to be done regarding her left lower extremity with Dr. Anguiano.  I will have her return in 3 months.  I did discuss vascular risk factors as they pertain to the progression of vascular disease including controlling hypertension.  The patient can continue taking their current medication regimen as previously planned.  This was all discussed in full with complete understanding.    Thank you for allowing me to participate in the care of your patient.  Please do not hesitate with any questions or concerns.  I will keep you aware of any further encounters with Dorcas Juarez.        Sincerely yours,         CRYSTAL Whitaker

## 2019-01-15 ENCOUNTER — TELEPHONE (OUTPATIENT)
Dept: VASCULAR SURGERY | Facility: CLINIC | Age: 74
End: 2019-01-15

## 2019-01-15 NOTE — TELEPHONE ENCOUNTER
Spoke with Serafin at [facility].  I was attempting to do a  predetermination for a procedure.  Serafin stated that patient has the extended plan F supplemental plan.  Serafin stated that this plan will pay and cover whatever Medicare does.  He stated that there was no need to do a predetermination because if Medicare pays the claim then [employer] will pay the claim.

## 2019-01-30 ENCOUNTER — PREP FOR SURGERY (OUTPATIENT)
Dept: OTHER | Facility: HOSPITAL | Age: 74
End: 2019-01-30

## 2019-02-06 ENCOUNTER — PREP FOR SURGERY (OUTPATIENT)
Dept: OTHER | Facility: HOSPITAL | Age: 74
End: 2019-02-06

## 2019-02-06 DIAGNOSIS — Z79.02 ENCOUNTER FOR MONITORING ANTIPLATELET THERAPY: ICD-10-CM

## 2019-02-06 DIAGNOSIS — I87.2 VENOUS (PERIPHERAL) INSUFFICIENCY: Primary | ICD-10-CM

## 2019-02-06 DIAGNOSIS — Z01.818 PREOP TESTING: ICD-10-CM

## 2019-02-06 DIAGNOSIS — Z51.81 ENCOUNTER FOR MONITORING ANTIPLATELET THERAPY: ICD-10-CM

## 2019-02-06 NOTE — H&P
"02/06/2019      Hair Echols MD  [address] [facility] [address]  [place] [address]     Dorcas Juarez  1945          Chief Complaint   Patient presents with   • Follow-up       1 Week Follow Up For Venous (Peripheral) Insufficiency. Test 12/26/18 US pad venous lower ext right. Patient denies any stroke like symptoms.          Dear Hair Echols MD           HPI  I had the pleasure of seeing your patient Dorcas Juarez in the office today.  As you recall, Dorcas Juarez is a 73 y.o.  female who has previously been seen by office by Dr. Ibrahim.  She was last seen in February 2015.  Per notes she underwent left greater saphenous vein stripping or ablation by Dr. Rodriguez and EVLT of tributaries by Dr. Ibrahim.  She has complaints of  Aching and burning.  She also has complaints of severe cramping at night when she sleeps.  She reports cramping has completely decreased in her right lower extremity status post recent radiofrequency ablation on 12/17/2018.  She complains of severe cramping to her left lower extremity and is also having cramping and other muscles even in her abdomen.  She does have compression stockings and wears them without relief.  She did have noninvasive testing performed today, which I did review in office.         Review of Systems   Constitutional: Negative.    HENT: Negative.    Eyes: Negative.    Respiratory: Negative.    Cardiovascular: Negative.         Bilateral leg pain, severe cramping   Gastrointestinal: Negative.         Muscle cramping and abdomen   Endocrine: Negative.    Genitourinary: Negative.    Musculoskeletal: Negative.    Skin: Negative.    Allergic/Immunologic: Negative.    Hematological: Negative.    Psychiatric/Behavioral: Negative.          /76 (BP Location: Right arm, Patient Position: Sitting, Cuff Size: Adult)   Pulse 75   Ht 160 cm (63\")   Wt 59.9 kg (132 lb)   SpO2 94%   BMI 23.38 kg/m²   Physical Exam   Constitutional: She is oriented to person, place, " and time. She appears well-developed and well-nourished. No distress.   HENT:   Head: Normocephalic and atraumatic.   Mouth/Throat: Oropharynx is clear and moist.   Eyes: Pupils are equal, round, and reactive to light. No scleral icterus.   Neck: Normal range of motion. Neck supple. No JVD present. Carotid bruit is not present. No thyromegaly present.   Cardiovascular: Normal rate, regular rhythm, S2 normal, normal heart sounds, intact distal pulses and normal pulses. Exam reveals no gallop and no friction rub.   No murmur heard.  Varicose veins to bilateral lower extremities   Pulmonary/Chest: Effort normal and breath sounds normal.   Abdominal: Soft. Normal aorta and bowel sounds are normal. There is no hepatosplenomegaly.   Musculoskeletal: Normal range of motion.   Neurological: She is alert and oriented to person, place, and time. No cranial nerve deficit.   Skin: Skin is warm and dry. She is not diaphoretic.   Psychiatric: She has a normal mood and affect. Her behavior is normal. Judgment and thought content normal.   Nursing note and vitals reviewed.        Diagnostic Data:  Xr Chest 2 Vw     Result Date: 12/12/2018  Narrative: EXAMINATION: XR CHEST 2 VW-  12/12/2018 1:20 PM CST  TWO-VIEW CHEST:  HISTORY: Hypertension  Frontal and lateral projection chest radiograph obtained.  COMPARISON:  7/30/2018  FINDINGS:  COPD changes noted.  The lungs are clear without acute infiltrates  The heart is normal in size, pulmonary circulation appropriate, without heart failure.  There are mild scoliotic changes in the thoracic spine with spondylosis.  Radiographically, the chest is unchanged.                                                                                                                   Impression: 1. No acute cardiopulmonary process.   This report was finalized on 12/12/2018 13:20 by Dr. Akil Paiz MD.     Us Guided Vascular Access     Result Date: 12/17/2018  Narrative: History: Limited right lower  extremity venous duplex was performed at the time of right greater saphenous vein radiofrequency ablation.  Comment: Limited right lower extremity venous duplex was performed with grayscale imaging color flow Doppler.  FINDINGS: The right greater saphenous vein was successfully accessed under ultrasound guidance and radial frequency ablation catheter was advanced with tip positioned 3 cm distal to the saphenofemoral junction.       Impression: Successful access and radiofrequency ablation catheter during right lower extremity greater saphenous vein radiofrequency ablation. This report was finalized on 12/17/2018 13:03 by Dr. Rodrigo Valadez MD.     Us Venous Doppler Lower Extremity Right (duplex)     Result Date: 12/26/2018  Narrative: History: Swelling       Impression: Impression: 1. There is no evidence of deep venous thrombosis of the right lower extremity. 2. The greater saphenous vein is closed from zones 2 through 6.  Comments: Right lower extremity venous duplex exam was performed using color Doppler flow, Doppler wave form analysis, and grayscale imaging, with and without compression. There is no evidence of deep venous thrombosis of the common femoral, superficial femoral, popliteal, posterior tibial, and peroneal veins. There is no thrombus identified in the saphenofemoral junction. There is no evidence of clot in the left common femoral vein.  This report was finalized on 12/26/2018 16:07 by Dr. Rolando Anguiano MD.     Us Venous Doppler Lower Extremity Bilateral (duplex)     Result Date: 12/7/2018  Narrative: History: Bilateral lower extremity swelling  Comment: Venous valvular insufficiency testing was performed in both legs using duplex ultrasound with rapid cuff deflation technique.  The common femoral veins, popliteal veins, posterior tibial veins, greater saphenous veins, and lesser saphenous veins were interrogated bilaterally.   On the right, the greater saphenous vein at the junction measures 5.7  mm. In the mid thigh measures 4.4 mm. Above the knee measures 3.4 mm. The mid calf measures 1.5mm. The ankle measures 2.0 mm. There is greater than 0.5 seconds of reflux from the saphenofemoral junction to the ankle. Proximally there is also an anterior branch that extends from zone 1 to zone 3 that measures 3.3 mm and has greater than 0.5 seconds of reflux noted. Lesser saphenous vein is 2.0mm at the knee, 3.2 mm at the mid calf, and 2.8 mm at the ankle. There is greater than 0.5 seconds of reflux from mid calf to the ankle. There is no evidence of DVT.  On the left, the greater saphenous vein has been stripped from the saphenofemoral junction to the above knee level.. The mid calf measures 3.8 mm. The ankle measured 2.4 mm. There is a large varicose vein in the proximal to mid thigh. There is also a  noted at the proximal calf which measures 4.9 mm and has 0.6 seconds of reflux. There is greater than 0.5 seconds of reflux from the midcalf to the ankle. Lesser saphenous vein is 1.5mm at the knee, 1.2 mm at the mid calf, and 3.1 mm at the ankle. There is no evidence of significant reflux.. There is no evidence of DVT.       Impression: Significant reflux in the bilateral lower extremity greater saphenous veins as noted above. There is also significant reflux noted in the right lower extremity lesser saphenous vein. There is no evidence of reflux in the left lower extremity lesser saphenous vein. There is no evidence of DVT in the bilateral lower extremities.  This report was finalized on 12/07/2018 15:12 by Dr. Rodrigo Valadez MD.           Patient Active Problem List   Diagnosis   • Hypertension   • Venous (peripheral) insufficiency             ICD-10-CM ICD-9-CM   1. Venous insufficiency I87.2 459.81   2. Essential hypertension I10 401.9   3. Muscle cramping R25.2 729.82            Plan: After thoroughly evaluating Dorcas Juarez, I am pleased to report she is doing well status post right lower extremity  radiofrequency ablation of the greater saphenous vein and anterior accessory branch.  She reports her cramping to her right lower extremity is significantly decreased.  She does still complain of severe cramping to her left lower extremity as well as muscle spasms in her abdomen.    I did discuss with Dr. kraft any further intervention of her left lower extremity.  We can venaseal from her knee to her ankle and possibly the large varicosity from the groin to the distal thigh and possible stab phlebectomy.  Risks of saphenous vein closure include, but are not limited to, bleeding, infection, vessel damage, sensitivity reaction, DVT, phlebitis, and pulmonary embolus.  The patient understands these risks and wishes to proceed with procedure.    In the meantime, she can continue continue to wear knee-high compression stockings in the 20-30 mm pressure gradient range.   We instructed her on how to wear the stockings on a daily basis and to keep her legs elevated when she is not on them.  I did discuss vascular risk factors as they pertain to the progression of vascular disease including controlling hypertension.  The patient can continue taking their current medication regimen as previously planned.  This was all discussed in full with complete understanding.     Thank you for allowing me to participate in the care of your patient.  Please do not hesitate with any questions or concerns.  I will keep you aware of any further encounters with Dorcas Juarez.           Sincerely yours,           CRYSTAL Whitaker

## 2019-02-12 ENCOUNTER — TELEPHONE (OUTPATIENT)
Dept: VASCULAR SURGERY | Facility: CLINIC | Age: 74
End: 2019-02-12

## 2019-02-12 NOTE — TELEPHONE ENCOUNTER
Left message for patient to return my call in reference to upcoming procedure.  Left all contact information for patient to return my call.

## 2019-03-25 ENCOUNTER — TELEPHONE (OUTPATIENT)
Dept: VASCULAR SURGERY | Facility: CLINIC | Age: 74
End: 2019-03-25

## 2019-03-25 NOTE — TELEPHONE ENCOUNTER
Left message reminding Mrs Juarez of her appointment for Tuesday, March 26th, 2019 at 9 am with Dr Anguiano. Also advised if she had any questions or needed to reschedule to please call the office at [phone].

## 2019-03-26 ENCOUNTER — OFFICE VISIT (OUTPATIENT)
Dept: VASCULAR SURGERY | Facility: CLINIC | Age: 74
End: 2019-03-26

## 2019-03-26 VITALS
BODY MASS INDEX: 23.39 KG/M2 | DIASTOLIC BLOOD PRESSURE: 58 MMHG | WEIGHT: 132 LBS | OXYGEN SATURATION: 94 % | HEART RATE: 71 BPM | SYSTOLIC BLOOD PRESSURE: 108 MMHG | HEIGHT: 63 IN

## 2019-03-26 DIAGNOSIS — R25.2 MUSCLE CRAMPING: ICD-10-CM

## 2019-03-26 DIAGNOSIS — I10 ESSENTIAL HYPERTENSION: ICD-10-CM

## 2019-03-26 DIAGNOSIS — I87.2 VENOUS (PERIPHERAL) INSUFFICIENCY: Primary | ICD-10-CM

## 2019-03-26 PROCEDURE — 99213 OFFICE O/P EST LOW 20 MIN: CPT | Performed by: NURSE PRACTITIONER

## 2019-03-26 RX ORDER — OXYBUTYNIN CHLORIDE 5 MG/1
TABLET ORAL
Refills: 2 | COMMUNITY
Start: 2019-02-26

## 2019-03-26 NOTE — PROGRESS NOTES
"3/27/2019    Hair Echols MD  [address] [facility] [address]  [facility] [address]    Dorcas Juarez  1945    Chief Complaint   Patient presents with   • Follow-up     Leg cramps and spasms have returned.  This has been going on for about 3-4 weeks.        Dear Hair Echols MD        HPI  I had the pleasure of seeing your patient Dorcas Juarez in the office today.  As you recall, Dorcas Juarez is a 73 y.o.  female who has previously been seen by office by Dr. Ibrahim.  She was last seen in February 2015.  Per notes she underwent left greater saphenous vein stripping or ablation by Dr. Rodriguez and EVLT of tributaries by Dr. Ibrahim.  She has complaints of  Aching and burning.  She also has complaints of severe cramping at night when she sleeps.  She reports cramping has completely decreased in her right lower extremity status post recent radiofrequency ablation on 12/17/2018.  She complains of severe cramping to her left lower extremity and is also having cramping and other muscles even in her abdomen.  She does have compression stockings and wears them without relief.  She reports severe leg cramping bilaterally however this did improve when we scheduled a previous procedure to her left lower extremity.  She does take vitamins and supplements in hopes this would decrease.      Review of Systems   Constitutional: Negative.    HENT: Negative.    Eyes: Negative.    Respiratory: Negative.    Cardiovascular: Negative.         Bilateral leg pain, severe cramping   Gastrointestinal: Negative.         Muscle cramping and abdomen   Endocrine: Negative.    Genitourinary: Negative.    Musculoskeletal: Negative.    Skin: Negative.    Allergic/Immunologic: Negative.    Hematological: Negative.    Psychiatric/Behavioral: Negative.        /58   Pulse 71   Ht 160 cm (63\")   Wt 59.9 kg (132 lb)   SpO2 94%   BMI 23.38 kg/m²   Physical Exam   Constitutional: She is oriented to person, place, and time. She appears " well-developed and well-nourished. No distress.   HENT:   Head: Normocephalic and atraumatic.   Mouth/Throat: Oropharynx is clear and moist.   Eyes: Pupils are equal, round, and reactive to light. No scleral icterus.   Neck: Normal range of motion. Neck supple. No JVD present. Carotid bruit is not present. No thyromegaly present.   Cardiovascular: Normal rate, regular rhythm, S2 normal, normal heart sounds, intact distal pulses and normal pulses. Exam reveals no gallop and no friction rub.   No murmur heard.  Varicose veins to bilateral lower extremities   Pulmonary/Chest: Effort normal and breath sounds normal.   Abdominal: Soft. Normal aorta and bowel sounds are normal. There is no hepatosplenomegaly.   Musculoskeletal: Normal range of motion.   Neurological: She is alert and oriented to person, place, and time. No cranial nerve deficit.   Skin: Skin is warm and dry. She is not diaphoretic.   Psychiatric: She has a normal mood and affect. Her behavior is normal. Judgment and thought content normal.   Nursing note and vitals reviewed.      Diagnostic Data:  No results found.     Patient Active Problem List   Diagnosis   • Hypertension   • Venous (peripheral) insufficiency         ICD-10-CM ICD-9-CM   1. Venous (peripheral) insufficiency I87.2 459.81   2. Essential hypertension I10 401.9   3. Muscle cramping R25.2 729.82         Plan: After thoroughly evaluating Dorcas Juarez, I believe the best course of action is to remain conservative from a vascular standpoint.  I did discuss with Dr. Anguiano and unsure that her venous insufficiency is the sole reason for her muscle cramping.  She does complain of severe cramping to her right lower extremity and this was closed with the venous heel procedure.  Previously she was having severe cramping and scheduled surgery of her left lower extremity however was canceled because her symptoms resolved.  She does still complain of cramping to her abdomen at times as well.  I did  discuss with Dr. Anguiano and did not necessarily feel that any further procedures would resolve her symptoms.    In the meantime, she can continue continue to wear knee-high compression stockings in the 20-30 mm pressure gradient range.   We instructed her on how to wear the stockings on a daily basis and to keep her legs elevated when she is not on them.  We can continue to follow-up in 6 months.  I did discuss vascular risk factors as they pertain to the progression of vascular disease including controlling hypertension.  The patient can continue taking their current medication regimen as previously planned.  This was all discussed in full with complete understanding.    Thank you for allowing me to participate in the care of your patient.  Please do not hesitate with any questions or concerns.  I will keep you aware of any further encounters with Dorcas Juarez.        Sincerely yours,         CRYSTAL Whitaker

## 2019-03-28 ENCOUNTER — TELEPHONE (OUTPATIENT)
Dept: VASCULAR SURGERY | Facility: CLINIC | Age: 74
End: 2019-03-28

## 2019-03-28 NOTE — TELEPHONE ENCOUNTER
Spoke with Mrs Juarez letting her know that Stacy ROJAS did speak with Dr Anguiano and they did not necessarily think all of her muscle cramps were related to her venous insufficiency given she has also been having abdominal cramps. They did discuss that her cramping has much improved to her left lower extremity without any intervention afraid that anything further would not improve her symptoms. I did let her know they would follow up with her in 6 months. Mrs Juarez verbalized understanding but stated she couldn't wait 6 months as she only slept one hour last night with her legs and they just quit cramping shortly before I had called her after she had taken Aleve. Mrs Juarez did state she thinks she needs muscle relaxer's to help her legs. I did let Mrs Juarez that is something her PCP would have to prescribe as we don't usually prescribe that kind of medication. Mrs Juarez stated she does have an appointment with her PCP next week so she will check with them .        ----- Message from CRYSTAL Whitaker sent at 3/27/2019  7:28 PM CDT -----  Please let her know I did discuss with Dr. Anguiano and agree did not necessarily think that all of her muscle cramps were related to her venous insufficiency given she also has abdominal cramps.  We did also discuss given that her cramping much improved to her left lower extremity without any intervention afraid that anything further would not improve her symptoms.  We can follow-up in 6 months for continued surveillance.  Please just make her an appointment with Dr. Anguiano at that time.    
given to family

## 2019-04-09 ENCOUNTER — TRANSCRIBE ORDERS (OUTPATIENT)
Dept: ADMINISTRATIVE | Facility: HOSPITAL | Age: 74
End: 2019-04-09

## 2019-04-09 DIAGNOSIS — Z12.39 SCREENING BREAST EXAMINATION: Primary | ICD-10-CM

## 2019-05-07 ENCOUNTER — TELEPHONE (OUTPATIENT)
Dept: GASTROENTEROLOGY | Facility: CLINIC | Age: 74
End: 2019-05-07

## 2019-05-07 NOTE — TELEPHONE ENCOUNTER
Heme positive stool x 2/3 received from Mee Bustamante on 5/2/19    Pt had appointment with me on 5/9, she cancelled and rescheduled to 5/21

## 2019-05-14 ENCOUNTER — TELEPHONE (OUTPATIENT)
Dept: VASCULAR SURGERY | Facility: CLINIC | Age: 74
End: 2019-05-14

## 2019-05-14 NOTE — TELEPHONE ENCOUNTER
Attempted to contact patient in reference to cancelled appointment on 4/10/2019 via televox.  There was no answer.

## 2019-05-21 ENCOUNTER — OFFICE VISIT (OUTPATIENT)
Dept: GASTROENTEROLOGY | Facility: CLINIC | Age: 74
End: 2019-05-21

## 2019-05-21 VITALS
TEMPERATURE: 98 F | OXYGEN SATURATION: 98 % | WEIGHT: 132 LBS | BODY MASS INDEX: 23.39 KG/M2 | DIASTOLIC BLOOD PRESSURE: 70 MMHG | HEART RATE: 58 BPM | HEIGHT: 63 IN | SYSTOLIC BLOOD PRESSURE: 118 MMHG

## 2019-05-21 DIAGNOSIS — R19.5 HEME POSITIVE STOOL: Primary | ICD-10-CM

## 2019-05-21 DIAGNOSIS — K21.9 GASTROESOPHAGEAL REFLUX DISEASE, ESOPHAGITIS PRESENCE NOT SPECIFIED: ICD-10-CM

## 2019-05-21 PROCEDURE — 99214 OFFICE O/P EST MOD 30 MIN: CPT | Performed by: NURSE PRACTITIONER

## 2019-05-21 NOTE — PROGRESS NOTES
Chief Complaint   Patient presents with   • Rectal Bleeding     had blood in stool sees blood sometimes had stool cards showed blood had colon dr. latosha blanchard        PCP: Hair Echols MD  REFER: Blake Lugo MD    Subjective     HPI    Followed by hematology for macrocytic anemia. Occult stool found to be positive 2 out of 3. She feels blood is related to external hemorrhoids.  Small amount of bright red blood on tissue paper.    She had been on medication for treatment of kidney stones.  She has experience constipation.  Stool will be flat without Use of miralax.   No abdominal pain.  Increase in heartburn.   She utilizes tums.  No dysphagia.     Colonoscopy Dr Monzon 2017-no polyp  Colonoscopy Dr Guadalupe hyperplastic polyp   She does have a previous history of tubular adenoma per note in Care Everywhere     No previous EGD    Past Medical History:   Diagnosis Date   • Abdominal pain    • Anticoagulant-induced hematuria    • Anxiety    • Arthritis    • Bilateral leg cramps    • Calculus of kidney     LEFT   • Clot hematuria    • Cystocele with rectocele    • Dysuria    • Hypertension    • Hypothyroidism    • Rectocele    • Urgency of urination    • UTI (urinary tract infection) with pyuria    • Venous insufficiency of both lower extremities        Past Surgical History:   Procedure Laterality Date   • ANTERIOR AND POSTERIOR VAGINAL REPAIR N/A 8/13/2018    Procedure: ANTERIOR AND POSTERIOR VAGINAL REPAIR;  Surgeon: Arlene Ocasio MD;  Location: [facility] OR;  Service: Obstetrics/Gynecology   • CATARACT EXTRACTION W/ INTRAOCULAR LENS IMPLANT     • CHOLECYSTECTOMY     • CYSTOSCOPY  09/2002   • HERNIA REPAIR     • LAPAROSCOPIC TUBAL LIGATION     • TONSILLECTOMY     • VARICOSE VEIN SURGERY Right 12/17/2018    Procedure: RIGHT SAPHENOUS VEIN RADIO FREQUENCY ABLATION;  Surgeon: Rolando Anguiano DO;  Location: [facility] HYBRID OR 12;  Service: Vascular       Outpatient Medications Marked as Taking for  the 5/21/19 encounter (Office Visit) with Brian Maher APRN   Medication Sig Dispense Refill   • calcium citrate-vitamin d (CITRACAL) 200-250 MG-UNIT tablet tablet Take  by mouth Daily.     • Cetirizine-Pseudoephedrine (ZYRTEC-D PO) Take 1 tablet by mouth Daily.     • conjugated estrogens (PREMARIN) 0.625 MG/GM vaginal cream Insert  into the vagina Daily. (Patient taking differently: Insert 0.5 g into the vagina 2 (Two) Times a Week.) 30 g 2   • Cranberry 500 MG tablet Take 1 tablet by mouth Daily.     • fluticasone (FLONASE) 50 MCG/ACT nasal spray 2 sprays into the nostril(s) as directed by provider Daily As Needed for Rhinitis or Allergies. Administer 2 sprays in each nostril for each dose.      • ibuprofen (ADVIL,MOTRIN) 200 MG tablet Take 400 mg by mouth Every 6 (Six) Hours As Needed for Mild Pain .     • levothyroxine (SYNTHROID, LEVOTHROID) 75 MCG tablet Take 75 mcg by mouth Daily.     • LORazepam (ATIVAN) 1 MG tablet Take 1 mg by mouth every 8 (eight) hours as needed for anxiety.     • losartan (COZAAR) 50 MG tablet Take 50 mg by mouth Daily.     • Magnesium 250 MG tablet Take 500 mg by mouth Daily.     • meloxicam (MOBIC) 15 MG tablet Take 1 tablet by mouth daily.  5   • Multiple Vitamins-Minerals (MULTIVITAMIN ADULT PO) 1 tablet Daily.     • naproxen sodium (ALEVE) 220 MG tablet Take 220 mg by mouth 2 (Two) Times a Day As Needed for Mild Pain .     • Omega 3-6-9 Fatty Acids (OMEGA 3-6-9 COMPLEX PO) Take 1 capsule by mouth 2 (Two) Times a Day.     • oxybutynin (DITROPAN) 5 MG tablet TK 1 T PO  TID PRF BLADDER SPASMS  2   • Phenazopyridine HCl (AZO URINARY PAIN PO) Take 1 tablet by mouth 3 (Three) Times a Day As Needed (bladder pain/uti).     • Phenylephrine-Acetaminophen (TYLENOL SINUS CONGESTION/PAIN PO) Take 1 tablet by mouth 2 (Two) Times a Day As Needed.     • temazepam (RESTORIL) 30 MG capsule Take 1 capsule by mouth daily.  2       Allergies   Allergen Reactions   • Bactrim  "[Sulfamethoxazole-Trimethoprim] Diarrhea   • Penicillins Hives   • Vioxx [Rofecoxib] Diarrhea   • Levaquin [Levofloxacin] Unknown (See Comments)     Pt cant remember reaction   • Sulfa Antibiotics Itching       Social History     Socioeconomic History   • Marital status:      Spouse name: Not on file   • Number of children: Not on file   • Years of education: Not on file   • Highest education level: Not on file   Tobacco Use   • Smoking status: Former Smoker     Years: 30.00     Types: Cigarettes     Last attempt to quit:      Years since quittin.4   • Smokeless tobacco: Never Used   Substance and Sexual Activity   • Alcohol use: No   • Drug use: No   • Sexual activity: Defer       Family History   Problem Relation Age of Onset   • Prostate cancer Father    • Colon polyps Mother    • Colon cancer Neg Hx        Review of Systems   Constitutional: Negative for fatigue, fever and unexpected weight change.   HENT: Negative for hearing loss, sore throat and voice change.    Eyes: Negative for visual disturbance.   Respiratory: Negative for cough, shortness of breath and wheezing.    Cardiovascular: Negative for chest pain and palpitations.   Gastrointestinal: Positive for blood in stool. Negative for abdominal pain and vomiting.   Endocrine: Negative for polydipsia and polyuria.   Genitourinary: Negative for difficulty urinating, dysuria, hematuria and urgency.   Musculoskeletal: Negative for joint swelling and myalgias.   Skin: Negative for color change, rash and wound.   Neurological: Negative for dizziness, tremors, seizures and syncope.   Hematological: Does not bruise/bleed easily.   Psychiatric/Behavioral: Negative for agitation and confusion. The patient is not nervous/anxious.        Objective     Vitals:    19 0850   BP: 118/70   Pulse: 58   Temp: 98 °F (36.7 °C)   SpO2: 98%   Weight: 59.9 kg (132 lb)   Height: 160 cm (63\")     Body mass index is 23.38 kg/m².    Physical Exam "   Constitutional: She is oriented to person, place, and time. She appears well-developed and well-nourished. She is cooperative.   HENT:   Head: Normocephalic and atraumatic.   Eyes: Conjunctivae are normal. Pupils are equal, round, and reactive to light. No scleral icterus.   Neck: Normal range of motion. Neck supple. No JVD present. No thyroid mass and no thyromegaly present.   Cardiovascular: Normal rate, regular rhythm and normal heart sounds. Exam reveals no gallop and no friction rub.   No murmur heard.  Pulmonary/Chest: Effort normal and breath sounds normal. No accessory muscle usage. No respiratory distress. She has no wheezes. She has no rales.   Abdominal: Soft. Normal appearance and bowel sounds are normal. She exhibits no distension, no ascites and no mass. There is no hepatosplenomegaly. There is no tenderness. There is no rebound and no guarding.   Musculoskeletal: Normal range of motion. She exhibits no edema or tenderness.     Vascular Status -  Her right foot exhibits normal foot vasculature  and no edema. Her left foot exhibits normal foot vasculature  and no edema.  Lymphadenopathy:     She has no cervical adenopathy.   Neurological: She is alert and oriented to person, place, and time. She has normal strength. Gait normal.   Skin: Skin is warm, dry and intact. No rash noted.       Imaging Results (most recent)     None          Body mass index is 23.38 kg/m².    Assessment/Plan     Dorcas was seen today for rectal bleeding.    Diagnoses and all orders for this visit:    Heme positive stool  -     Case Request; Standing  -     Implement Anesthesia Orders Day of Procedure; Standing  -     Obtain Informed Consent; Standing  -     Case Request    Gastroesophageal reflux disease, esophagitis presence not specified        ESOPHAGOGASTRODUODENOSCOPY WITH ANESTHESIA (N/A)    If EGD negative consider colonoscopy, to be determined after Dr Cleaning evaluation  Pt wished to wait until after egd prior to  initiation of medication    The risk of the endoscopy were discussed in detail.  We discussed the risk of perforation (one out of 3033-2098, riskier with dilation), bleeding (one out of 500), and the rare risks of infection, adverse reaction to anesthesia, respiratory failure, cardiac failure including MI and adverse reaction to medications, etc.  We discussed consequences that could occur if a risk were to develop such as the need for hospitalization, blood transfusion, surgical intervention, medications, pain and disability and death.  Alternatives include not doing anything, or pursuing an UGI series which only offers a diagnosis with potential less accuracy compared to egd.  The patient verbalizes understanding and agrees to proceed.            There are no Patient Instructions on file for this visit.

## 2019-05-30 ENCOUNTER — ANESTHESIA EVENT (OUTPATIENT)
Dept: GASTROENTEROLOGY | Facility: HOSPITAL | Age: 74
End: 2019-05-30

## 2019-05-30 ENCOUNTER — TELEPHONE (OUTPATIENT)
Dept: GASTROENTEROLOGY | Facility: CLINIC | Age: 74
End: 2019-05-30

## 2019-05-30 ENCOUNTER — ANESTHESIA (OUTPATIENT)
Dept: GASTROENTEROLOGY | Facility: HOSPITAL | Age: 74
End: 2019-05-30

## 2019-05-30 ENCOUNTER — HOSPITAL ENCOUNTER (OUTPATIENT)
Facility: HOSPITAL | Age: 74
Setting detail: HOSPITAL OUTPATIENT SURGERY
Discharge: HOME | End: 2019-05-30
Attending: INTERNAL MEDICINE | Admitting: INTERNAL MEDICINE

## 2019-05-30 ENCOUNTER — PREP FOR SURGERY (OUTPATIENT)
Dept: OTHER | Facility: HOSPITAL | Age: 74
End: 2019-05-30

## 2019-05-30 VITALS
OXYGEN SATURATION: 98 % | RESPIRATION RATE: 18 BRPM | BODY MASS INDEX: 24.11 KG/M2 | HEART RATE: 61 BPM | WEIGHT: 131 LBS | SYSTOLIC BLOOD PRESSURE: 134 MMHG | TEMPERATURE: 97.4 F | HEIGHT: 62 IN | DIASTOLIC BLOOD PRESSURE: 86 MMHG

## 2019-05-30 DIAGNOSIS — R19.5 HEME POSITIVE STOOL: ICD-10-CM

## 2019-05-30 DIAGNOSIS — R19.5 HEME POSITIVE STOOL: Primary | ICD-10-CM

## 2019-05-30 PROCEDURE — 87081 CULTURE SCREEN ONLY: CPT | Performed by: INTERNAL MEDICINE

## 2019-05-30 PROCEDURE — 25010000002 PROPOFOL 10 MG/ML EMULSION: Performed by: NURSE ANESTHETIST, CERTIFIED REGISTERED

## 2019-05-30 PROCEDURE — 43239 EGD BIOPSY SINGLE/MULTIPLE: CPT | Performed by: INTERNAL MEDICINE

## 2019-05-30 RX ORDER — SODIUM CHLORIDE 0.9 % (FLUSH) 0.9 %
3 SYRINGE (ML) INJECTION AS NEEDED
Status: DISCONTINUED | OUTPATIENT
Start: 2019-05-30 | End: 2019-05-30 | Stop reason: HOSPADM

## 2019-05-30 RX ORDER — SODIUM CHLORIDE 9 MG/ML
500 INJECTION, SOLUTION INTRAVENOUS CONTINUOUS PRN
Status: DISCONTINUED | OUTPATIENT
Start: 2019-05-30 | End: 2019-05-30 | Stop reason: HOSPADM

## 2019-05-30 RX ORDER — PROPOFOL 10 MG/ML
VIAL (ML) INTRAVENOUS AS NEEDED
Status: DISCONTINUED | OUTPATIENT
Start: 2019-05-30 | End: 2019-05-30 | Stop reason: SURG

## 2019-05-30 RX ADMIN — PROPOFOL 100 MG: 10 INJECTION, EMULSION INTRAVENOUS at 11:45

## 2019-05-30 RX ADMIN — SODIUM CHLORIDE 500 ML: 9 INJECTION, SOLUTION INTRAVENOUS at 10:45

## 2019-05-30 NOTE — ANESTHESIA PREPROCEDURE EVALUATION
Anesthesia Evaluation     Patient summary reviewed   no history of anesthetic complications:  NPO Solid Status: > 8 hours             Airway   Mallampati: I  TM distance: >3 FB  Neck ROM: full  Dental      Pulmonary - negative pulmonary ROS   Cardiovascular   Exercise tolerance: excellent (>7 METS)    (+) hypertension,       Neuro/Psych- negative ROS  GI/Hepatic/Renal/Endo - negative ROS     Musculoskeletal     Abdominal    Substance History      OB/GYN          Other                        Anesthesia Plan    ASA 2     MAC     Anesthetic plan, all risks, benefits, and alternatives have been provided, discussed and informed consent has been obtained with: patient.

## 2019-05-30 NOTE — ANESTHESIA POSTPROCEDURE EVALUATION
Patient: Dorcas Juarez    Procedure Summary     Date:  05/30/19 Room / Location:  [facility] ENDOSCOPY 4 / [facility] ENDOSCOPY    Anesthesia Start:  1144 Anesthesia Stop:  1148    Procedure:  ESOPHAGOGASTRODUODENOSCOPY WITH ANESTHESIA (N/A ) Diagnosis:       Heme positive stool      (Heme positive stool [R19.5])    Surgeon:  Armin Cleaning DO Provider:  Carlton Dia CRNA    Anesthesia Type:  MAC ASA Status:  2          Anesthesia Type: MAC  Last vitals  BP   127/69 (05/30/19 1032)   Temp   97.4 °F (36.3 °C) (05/30/19 1032)   Pulse   69 (05/30/19 1032)   Resp   18 (05/30/19 1032)     SpO2   95 % (05/30/19 1032)     Post Anesthesia Care and Evaluation    Patient location during evaluation: PHASE II  Patient participation: complete - patient participated  Level of consciousness: awake and alert  Pain management: adequate  Airway patency: patent  Anesthetic complications: No anesthetic complications  PONV Status: none  Cardiovascular status: acceptable and stable  Respiratory status: acceptable and unassisted  Hydration status: acceptable

## 2019-05-31 LAB — UREASE TISS QL: POSITIVE

## 2019-06-07 ENCOUNTER — ANESTHESIA EVENT (OUTPATIENT)
Dept: GASTROENTEROLOGY | Facility: HOSPITAL | Age: 74
End: 2019-06-07

## 2019-06-07 ENCOUNTER — HOSPITAL ENCOUNTER (OUTPATIENT)
Facility: HOSPITAL | Age: 74
Setting detail: HOSPITAL OUTPATIENT SURGERY
Discharge: HOME | End: 2019-06-07
Attending: INTERNAL MEDICINE | Admitting: INTERNAL MEDICINE

## 2019-06-07 ENCOUNTER — ANESTHESIA (OUTPATIENT)
Dept: GASTROENTEROLOGY | Facility: HOSPITAL | Age: 74
End: 2019-06-07

## 2019-06-07 VITALS
DIASTOLIC BLOOD PRESSURE: 78 MMHG | HEIGHT: 62 IN | BODY MASS INDEX: 24.29 KG/M2 | TEMPERATURE: 97 F | HEART RATE: 69 BPM | RESPIRATION RATE: 18 BRPM | WEIGHT: 132 LBS | OXYGEN SATURATION: 96 % | SYSTOLIC BLOOD PRESSURE: 131 MMHG

## 2019-06-07 PROCEDURE — 25010000002 PROPOFOL 10 MG/ML EMULSION: Performed by: NURSE ANESTHETIST, CERTIFIED REGISTERED

## 2019-06-07 PROCEDURE — 45378 DIAGNOSTIC COLONOSCOPY: CPT | Performed by: INTERNAL MEDICINE

## 2019-06-07 RX ORDER — SODIUM CHLORIDE 9 MG/ML
500 INJECTION, SOLUTION INTRAVENOUS CONTINUOUS PRN
Status: DISCONTINUED | OUTPATIENT
Start: 2019-06-07 | End: 2019-06-07 | Stop reason: HOSPADM

## 2019-06-07 RX ORDER — PROPOFOL 10 MG/ML
VIAL (ML) INTRAVENOUS AS NEEDED
Status: DISCONTINUED | OUTPATIENT
Start: 2019-06-07 | End: 2019-06-07 | Stop reason: SURG

## 2019-06-07 RX ORDER — SODIUM CHLORIDE 0.9 % (FLUSH) 0.9 %
3 SYRINGE (ML) INJECTION AS NEEDED
Status: DISCONTINUED | OUTPATIENT
Start: 2019-06-07 | End: 2019-06-07 | Stop reason: HOSPADM

## 2019-06-07 RX ADMIN — PROPOFOL 50 MG: 10 INJECTION, EMULSION INTRAVENOUS at 12:01

## 2019-06-07 RX ADMIN — PROPOFOL 50 MG: 10 INJECTION, EMULSION INTRAVENOUS at 12:03

## 2019-06-07 RX ADMIN — SODIUM CHLORIDE: 0.9 INJECTION, SOLUTION INTRAVENOUS at 11:51

## 2019-06-07 NOTE — ANESTHESIA POSTPROCEDURE EVALUATION
Patient: Dorcas Juarez    Procedure Summary     Date:  06/07/19 Room / Location:  [facility] ENDOSCOPY 5 / [facility]    Anesthesia Start:  1156 Anesthesia Stop:  1210    Procedure:  COLONOSCOPY WITH ANESTHESIA (N/A ) Diagnosis:       Heme positive stool      (Heme positive stool [R19.5])    Surgeon:  Armin Cleaning DO Provider:  Carlton Dia CRNA    Anesthesia Type:  MAC ASA Status:  2          Anesthesia Type: MAC  Last vitals  BP   157/65 (06/07/19 1101)   Temp   97 °F (36.1 °C) (06/07/19 1101)   Pulse   89 (06/07/19 1101)   Resp   20 (06/07/19 1101)     SpO2   94 % (06/07/19 1101)     Post Anesthesia Care and Evaluation    Patient location during evaluation: PHASE II  Patient participation: complete - patient participated  Level of consciousness: awake and alert  Pain score: 0  Pain management: adequate  Airway patency: patent  Anesthetic complications: No anesthetic complications  PONV Status: none  Cardiovascular status: acceptable and stable  Respiratory status: acceptable and unassisted  Hydration status: acceptable

## 2019-06-07 NOTE — ANESTHESIA PREPROCEDURE EVALUATION
Anesthesia Evaluation     Patient summary reviewed   no history of anesthetic complications:  NPO Solid Status: > 8 hours  NPO Liquid Status: > 2 hours           Airway   Mallampati: I  TM distance: >3 FB  Neck ROM: full  Dental      Pulmonary - negative pulmonary ROS   Cardiovascular   Exercise tolerance: excellent (>7 METS)    (+) hypertension,       Neuro/Psych  (+) psychiatric history Anxiety,     GI/Hepatic/Renal/Endo    (+)   hypothyroidism,     Musculoskeletal     Abdominal    Substance History      OB/GYN          Other   (+) arthritis                       Anesthesia Plan    ASA 2     MAC     Anesthetic plan, all risks, benefits, and alternatives have been provided, discussed and informed consent has been obtained with: patient.

## 2019-06-14 ENCOUNTER — APPOINTMENT (OUTPATIENT)
Dept: MAMMOGRAPHY | Facility: HOSPITAL | Age: 74
End: 2019-06-14

## 2019-06-14 ENCOUNTER — HOSPITAL ENCOUNTER (OUTPATIENT)
Dept: MAMMOGRAPHY | Facility: HOSPITAL | Age: 74
Discharge: HOME | End: 2019-06-14
Admitting: INTERNAL MEDICINE

## 2019-06-14 DIAGNOSIS — Z12.39 SCREENING BREAST EXAMINATION: ICD-10-CM

## 2019-06-14 PROCEDURE — 77067 SCR MAMMO BI INCL CAD: CPT

## 2019-06-14 PROCEDURE — 77063 BREAST TOMOSYNTHESIS BI: CPT

## 2019-07-23 ENCOUNTER — TRANSCRIBE ORDERS (OUTPATIENT)
Dept: ADMINISTRATIVE | Facility: HOSPITAL | Age: 74
End: 2019-07-23

## 2019-07-23 ENCOUNTER — HOSPITAL ENCOUNTER (OUTPATIENT)
Dept: GENERAL RADIOLOGY | Facility: HOSPITAL | Age: 74
Discharge: HOME | End: 2019-07-23
Admitting: NURSE PRACTITIONER

## 2019-07-23 DIAGNOSIS — M25.551 PAIN IN RIGHT HIP: ICD-10-CM

## 2019-07-23 DIAGNOSIS — M54.5 LOW BACK PAIN, UNSPECIFIED BACK PAIN LATERALITY, UNSPECIFIED CHRONICITY, WITH SCIATICA PRESENCE UNSPECIFIED: Primary | ICD-10-CM

## 2019-07-23 PROCEDURE — 72110 X-RAY EXAM L-2 SPINE 4/>VWS: CPT

## 2019-07-23 PROCEDURE — 73502 X-RAY EXAM HIP UNI 2-3 VIEWS: CPT

## 2020-05-13 ENCOUNTER — TRANSCRIBE ORDERS (OUTPATIENT)
Dept: ADMINISTRATIVE | Facility: HOSPITAL | Age: 75
End: 2020-05-13

## 2020-05-13 DIAGNOSIS — Z78.0 POSTMENOPAUSAL STATUS: ICD-10-CM

## 2020-05-13 DIAGNOSIS — Z12.31 ENCOUNTER FOR SCREENING MAMMOGRAM FOR MALIGNANT NEOPLASM OF BREAST: Primary | ICD-10-CM

## 2020-06-15 ENCOUNTER — HOSPITAL ENCOUNTER (OUTPATIENT)
Dept: MAMMOGRAPHY | Facility: HOSPITAL | Age: 75
Discharge: HOME | End: 2020-06-15
Admitting: NURSE PRACTITIONER

## 2020-06-15 ENCOUNTER — HOSPITAL ENCOUNTER (OUTPATIENT)
Dept: BONE DENSITY | Facility: HOSPITAL | Age: 75
Discharge: HOME | End: 2020-06-15

## 2020-06-15 DIAGNOSIS — Z78.0 POSTMENOPAUSAL STATUS: ICD-10-CM

## 2020-06-15 DIAGNOSIS — Z12.31 ENCOUNTER FOR SCREENING MAMMOGRAM FOR MALIGNANT NEOPLASM OF BREAST: ICD-10-CM

## 2020-06-15 PROCEDURE — 77063 BREAST TOMOSYNTHESIS BI: CPT

## 2020-06-15 PROCEDURE — 77080 DXA BONE DENSITY AXIAL: CPT

## 2020-06-15 PROCEDURE — 77067 SCR MAMMO BI INCL CAD: CPT

## 2020-07-09 RX ORDER — TETRACYCLINE HYDROCHLORIDE 500 MG/1
500 CAPSULE ORAL 4 TIMES DAILY
Qty: 40 CAPSULE | Refills: 0 | Status: SHIPPED | OUTPATIENT
Start: 2020-07-09 | End: 2020-07-14

## 2020-07-09 RX ORDER — METRONIDAZOLE 250 MG/1
250 TABLET ORAL 4 TIMES DAILY
Qty: 40 TABLET | Refills: 0 | Status: SHIPPED | OUTPATIENT
Start: 2020-07-09 | End: 2020-07-14 | Stop reason: SDUPTHER

## 2020-07-10 ENCOUNTER — TELEPHONE (OUTPATIENT)
Dept: GASTROENTEROLOGY | Facility: CLINIC | Age: 75
End: 2020-07-10

## 2020-07-14 RX ORDER — CLARITHROMYCIN 500 MG/1
500 TABLET, COATED ORAL 2 TIMES DAILY
Qty: 20 TABLET | Refills: 0 | Status: SHIPPED | OUTPATIENT
Start: 2020-07-14 | End: 2020-07-24

## 2020-07-14 RX ORDER — OMEPRAZOLE 40 MG/1
40 CAPSULE, DELAYED RELEASE ORAL 2 TIMES DAILY
Qty: 20 CAPSULE | Refills: 0 | Status: SHIPPED | OUTPATIENT
Start: 2020-07-14 | End: 2020-08-13

## 2020-07-14 RX ORDER — METRONIDAZOLE 250 MG/1
250 TABLET ORAL 3 TIMES DAILY
Qty: 30 TABLET | Refills: 0 | Status: SHIPPED | OUTPATIENT
Start: 2020-07-14 | End: 2020-07-24

## 2020-08-17 DIAGNOSIS — A04.8 H. PYLORI INFECTION: Primary | ICD-10-CM

## 2020-09-04 ENCOUNTER — LAB (OUTPATIENT)
Dept: LAB | Facility: HOSPITAL | Age: 75
End: 2020-09-04

## 2020-09-04 DIAGNOSIS — A04.8 H. PYLORI INFECTION: ICD-10-CM

## 2020-09-08 ENCOUNTER — LAB (OUTPATIENT)
Dept: LAB | Facility: HOSPITAL | Age: 75
End: 2020-09-08

## 2020-09-08 PROCEDURE — 87338 HPYLORI STOOL AG IA: CPT | Performed by: INTERNAL MEDICINE

## 2020-09-11 LAB — H PYLORI AG STL QL IA: NEGATIVE

## 2021-02-24 ENCOUNTER — HOSPITAL ENCOUNTER (OUTPATIENT)
Dept: ULTRASOUND IMAGING | Facility: HOSPITAL | Age: 76
Discharge: HOME | End: 2021-02-24
Admitting: NURSE PRACTITIONER

## 2021-02-24 ENCOUNTER — TRANSCRIBE ORDERS (OUTPATIENT)
Dept: ADMINISTRATIVE | Facility: HOSPITAL | Age: 76
End: 2021-02-24

## 2021-02-24 DIAGNOSIS — R09.89 OTHER SPECIFIED SYMPTOMS AND SIGNS INVOLVING THE CIRCULATORY AND RESPIRATORY SYSTEMS: ICD-10-CM

## 2021-02-24 DIAGNOSIS — R41.3 OTHER AMNESIA: Primary | ICD-10-CM

## 2021-02-24 DIAGNOSIS — R41.3 OTHER AMNESIA: ICD-10-CM

## 2021-02-24 PROCEDURE — 93880 EXTRACRANIAL BILAT STUDY: CPT

## 2021-03-04 ENCOUNTER — TRANSCRIBE ORDERS (OUTPATIENT)
Dept: ADMINISTRATIVE | Facility: HOSPITAL | Age: 76
End: 2021-03-04

## 2021-03-04 DIAGNOSIS — M79.604 RIGHT LEG PAIN: ICD-10-CM

## 2021-03-04 DIAGNOSIS — M79.605 LEFT LEG PAIN: Primary | ICD-10-CM

## 2021-03-12 ENCOUNTER — HOSPITAL ENCOUNTER (OUTPATIENT)
Dept: MRI IMAGING | Facility: HOSPITAL | Age: 76
Discharge: HOME | End: 2021-03-12
Admitting: INTERNAL MEDICINE

## 2021-03-12 DIAGNOSIS — M79.605 LEFT LEG PAIN: ICD-10-CM

## 2021-03-12 DIAGNOSIS — M79.604 RIGHT LEG PAIN: ICD-10-CM

## 2021-03-12 PROCEDURE — 72148 MRI LUMBAR SPINE W/O DYE: CPT

## 2021-03-19 ENCOUNTER — TRANSCRIBE ORDERS (OUTPATIENT)
Dept: PULMONOLOGY | Facility: HOSPITAL | Age: 76
End: 2021-03-19

## 2021-03-19 DIAGNOSIS — M79.605 LEFT LEG PAIN: ICD-10-CM

## 2021-03-19 DIAGNOSIS — M79.604 RIGHT LEG PAIN: Primary | ICD-10-CM

## 2021-04-06 ENCOUNTER — HOSPITAL ENCOUNTER (OUTPATIENT)
Dept: NEUROLOGY | Facility: HOSPITAL | Age: 76
Discharge: HOME | End: 2021-04-06
Admitting: INTERNAL MEDICINE

## 2021-04-06 PROCEDURE — 95886 MUSC TEST DONE W/N TEST COMP: CPT

## 2021-04-06 PROCEDURE — 95911 NRV CNDJ TEST 9-10 STUDIES: CPT

## 2021-05-14 ENCOUNTER — TRANSCRIBE ORDERS (OUTPATIENT)
Dept: ADMINISTRATIVE | Facility: HOSPITAL | Age: 76
End: 2021-05-14

## 2021-05-14 DIAGNOSIS — Z12.31 ENCOUNTER FOR SCREENING MAMMOGRAM FOR MALIGNANT NEOPLASM OF BREAST: Primary | ICD-10-CM

## 2021-06-15 ENCOUNTER — APPOINTMENT (OUTPATIENT)
Dept: MAMMOGRAPHY | Facility: HOSPITAL | Age: 76
End: 2021-06-15

## 2021-06-23 ENCOUNTER — HOSPITAL ENCOUNTER (OUTPATIENT)
Dept: GENERAL RADIOLOGY | Facility: HOSPITAL | Age: 76
Discharge: HOME | End: 2021-06-23
Admitting: NURSE PRACTITIONER

## 2021-06-23 ENCOUNTER — OFFICE VISIT (OUTPATIENT)
Dept: NEUROSURGERY | Facility: CLINIC | Age: 76
End: 2021-06-23

## 2021-06-23 VITALS
HEIGHT: 62 IN | DIASTOLIC BLOOD PRESSURE: 82 MMHG | SYSTOLIC BLOOD PRESSURE: 124 MMHG | WEIGHT: 140.8 LBS | BODY MASS INDEX: 25.91 KG/M2

## 2021-06-23 DIAGNOSIS — Z78.9 NON-SMOKER: ICD-10-CM

## 2021-06-23 DIAGNOSIS — M43.16 SPONDYLOLISTHESIS OF LUMBAR REGION: Primary | ICD-10-CM

## 2021-06-23 DIAGNOSIS — M43.16 SPONDYLOLISTHESIS OF LUMBAR REGION: ICD-10-CM

## 2021-06-23 DIAGNOSIS — M48.061 SPINAL STENOSIS OF LUMBAR REGION WITHOUT NEUROGENIC CLAUDICATION: ICD-10-CM

## 2021-06-23 DIAGNOSIS — E66.3 OVERWEIGHT WITH BODY MASS INDEX (BMI) OF 25 TO 25.9 IN ADULT: ICD-10-CM

## 2021-06-23 PROCEDURE — 99214 OFFICE O/P EST MOD 30 MIN: CPT | Performed by: NURSE PRACTITIONER

## 2021-06-23 PROCEDURE — 72114 X-RAY EXAM L-S SPINE BENDING: CPT

## 2021-06-23 NOTE — PROGRESS NOTES
Chief complaint:   Chief Complaint   Patient presents with   • bilateral leg pain     Dorcas has been referred her today from Dr. Mancuso after an MRI of the lumbar for her bilateral leg pain.  She states she has been dealing with this for a couple years, she has not tried any physical therapy.        Subjective     HPI: This is a 75-year-old female patient who was referred to us by Dr. Taurus Mancuso for back and leg pain.  She is here to be evaluated today.  The patient says that this is been going on for about 2 years.  She does have intermittent back pain that is worse with strenuous activities and better with resting.  She also does have intermittent leg pain bilaterally with the right being worse than the left.  The pain is worse with bending and better with reclining.  Denies any bowel or bladder incontinence.  She has not done any recent physical therapy or chiropractic care or pain management injections.  She says that she can have some improvement with NSAIDs.  She is right-hand dominant.  She does work with Dr. Taurus Mancuso in regards to her arthritis issues.  She is retired.  She denies any tobacco, alcohol, or illicit drug use.  Rates her pain on a scale 0-10 at a 7.  She says it can interfere with actives of daily living.    Review of Systems   HENT: Positive for sinus pressure.    Neurological: Positive for numbness.   All other systems reviewed and are negative.       Past Medical History:   Diagnosis Date   • Abdominal pain    • Anticoagulant-induced hematuria    • Anxiety    • Arthritis    • Bilateral leg cramps    • Bilateral leg pain    • Calculus of kidney     LEFT   • Clot hematuria    • Cystocele with rectocele    • Dysuria    • Hypertension    • Hypothyroidism    • Rectocele    • Urgency of urination    • UTI (urinary tract infection) with pyuria    • Venous insufficiency of both lower extremities      Past Surgical History:   Procedure Laterality Date   • ANTERIOR AND POSTERIOR  "VAGINAL REPAIR N/A 2018    Procedure: ANTERIOR AND POSTERIOR VAGINAL REPAIR;  Surgeon: Arlene Ocasio MD;  Location: [facility] OR;  Service: Obstetrics/Gynecology   • CATARACT EXTRACTION W/ INTRAOCULAR LENS IMPLANT     • CHOLECYSTECTOMY     • COLONOSCOPY N/A 2019    Procedure: COLONOSCOPY WITH ANESTHESIA;  Surgeon: Armin Cleaning DO;  Location: [facility] ENDOSCOPY;  Service: Gastroenterology   • CYSTOSCOPY  2002   • ENDOSCOPY N/A 2019    Procedure: ESOPHAGOGASTRODUODENOSCOPY WITH ANESTHESIA;  Surgeon: Armin Cleaning DO;  Location: [facility] ENDOSCOPY;  Service: Gastroenterology   • HERNIA REPAIR     • LAPAROSCOPIC TUBAL LIGATION     • TONSILLECTOMY     • VARICOSE VEIN SURGERY Right 2018    Procedure: RIGHT SAPHENOUS VEIN RADIO FREQUENCY ABLATION;  Surgeon: Rolando Anguiano DO;  Location: [facility] HYBRID OR 12;  Service: Vascular     Family History   Problem Relation Age of Onset   • Prostate cancer Father    • Colon polyps Mother    • Kidney disease Mother    • Colon cancer Neg Hx    • Breast cancer Neg Hx      Social History     Tobacco Use   • Smoking status: Former Smoker     Years: 30.00     Types: Cigarettes     Quit date:      Years since quittin.4   • Smokeless tobacco: Never Used   Substance Use Topics   • Alcohol use: No   • Drug use: No     (Not in a hospital admission)    Allergies:  Bactrim [sulfamethoxazole-trimethoprim], Penicillins, Vioxx [rofecoxib], Levaquin [levofloxacin], and Sulfa antibiotics    Objective      Vital Signs  /82   Ht 157.5 cm (62\")   Wt 63.9 kg (140 lb 12.8 oz)   BMI 25.75 kg/m²     Physical Exam  Constitutional:       Appearance: Normal appearance. She is well-developed.   HENT:      Head: Normocephalic.   Eyes:      General: Lids are normal.      Extraocular Movements: EOM normal.      Conjunctiva/sclera: Conjunctivae normal.      Pupils: Pupils are equal, round, and reactive to light.   Cardiovascular:      Rate and Rhythm: Normal rate " and regular rhythm.   Pulmonary:      Effort: Pulmonary effort is normal.      Breath sounds: Normal breath sounds.   Musculoskeletal:         General: Normal range of motion.      Cervical back: Normal range of motion.   Skin:     General: Skin is warm.   Neurological:      Mental Status: She is alert and oriented to person, place, and time.      GCS: GCS eye subscore is 4. GCS verbal subscore is 5. GCS motor subscore is 6.      Cranial Nerves: No cranial nerve deficit.      Sensory: No sensory deficit.      Gait: Gait is intact.      Deep Tendon Reflexes: Strength normal and reflexes are normal and symmetric. Reflexes normal.   Psychiatric:         Speech: Speech normal.         Behavior: Behavior normal.         Thought Content: Thought content normal.         Neurologic Exam     Mental Status   Oriented to person, place, and time.   Attention: normal. Concentration: normal.   Speech: speech is normal   Level of consciousness: alert  Normal comprehension.     Cranial Nerves     CN II   Visual fields full to confrontation.     CN III, IV, VI   Pupils are equal, round, and reactive to light.  Extraocular motions are normal.     CN V   Facial sensation intact.     CN VII   Facial expression full, symmetric.     CN VIII   CN VIII normal.     CN IX, X   CN IX normal.   CN X normal.     CN XI   CN XI normal.     CN XII   CN XII normal.     Motor Exam   Muscle bulk: normal    Strength   Strength 5/5 throughout.     Sensory Exam   Light touch normal.     Gait, Coordination, and Reflexes     Gait  Gait: normal    Reflexes   Reflexes 2+ except as noted.       Imaging review: MRI of the lumbar spine that was done 3/12/2021 shows patient does have a spondylolisthesis at L L5-S1.  There is a disc protrusion at L L5-S1.  Facet arthropathy is present with bilateral foraminal narrowing with the right being severe in the left being moderate.        Assessment/Plan: I am going to send the patient for stat x-rays of the lumbar  spine to include standing flexion and extension  For first line conservative care of lumbar pain, I would like to send Ms. Juarez for a dedicated course of physician directed physical therapy consisting of 2-3 times a week for 4-6 weeks.    Return for reassessment with Dr. Murray after physical therapy.     I advised the patient to call and return sooner for new or worsening complaints of weakness, paresthesias, gait disturbances, or any additional concerns.  Treatment options discussed in detail with Dorcas and the patient voiced understanding.  Ms. Juarez agrees with this plan of care.     Patient is a nonsmoker  The patient's Body mass index is 25.75 kg/m².. BMI is above normal parameters. Recommendations include: educational material and nutrition counseling  Advance Care Planning   ACP discussion was held with the patient during this visit. Patient does not have an advance directive, information provided.      Diagnoses and all orders for this visit:    1. Spondylolisthesis of lumbar region (Primary)  -     XR Spine Lumbar Complete With Flex & Ext; Future  -     Ambulatory Referral to Physical Therapy Evaluate and treat (2-3 days a week for 4-6 weeks )    2. Spinal stenosis of lumbar region without neurogenic claudication  -     XR Spine Lumbar Complete With Flex & Ext; Future  -     Ambulatory Referral to Physical Therapy Evaluate and treat (2-3 days a week for 4-6 weeks )    3. Overweight with body mass index (BMI) of 25 to 25.9 in adult    4. Non-smoker          I discussed the patients findings and my recommendations with patient    Brad BRYNN Franklin, APRN  06/23/21  09:36 CDT

## 2021-06-29 ENCOUNTER — HOSPITAL ENCOUNTER (OUTPATIENT)
Dept: MAMMOGRAPHY | Facility: HOSPITAL | Age: 76
Discharge: HOME | End: 2021-06-29
Admitting: PHYSICIAN ASSISTANT

## 2021-06-29 DIAGNOSIS — Z12.31 ENCOUNTER FOR SCREENING MAMMOGRAM FOR MALIGNANT NEOPLASM OF BREAST: ICD-10-CM

## 2021-06-29 PROCEDURE — 77063 BREAST TOMOSYNTHESIS BI: CPT

## 2021-06-29 PROCEDURE — 77067 SCR MAMMO BI INCL CAD: CPT

## 2021-08-09 ENCOUNTER — TELEPHONE (OUTPATIENT)
Dept: NEUROSURGERY | Facility: CLINIC | Age: 76
End: 2021-08-09

## 2021-08-09 NOTE — TELEPHONE ENCOUNTER
Dorcas has called the office requesting a call back, she states she has just completed her physical therapy and it has made absolutely no difference in her pain, her therapist felt she should call the office, she doesn't see Dr. Murray until October.    Please call patient back and advise her.

## 2021-08-09 NOTE — TELEPHONE ENCOUNTER
Called patient back to let her know that she is on Dr. Murray's cancellation list for a sooner appointment than her October appointment, I also let her know that we could make her a pain management referral, she did not want to do that at this time but if she decides too she knows that she can call the office and we will put a referral in for her.  She understood and wait to hear back for a sooner appointment.

## 2021-08-10 ENCOUNTER — OFFICE VISIT (OUTPATIENT)
Dept: NEUROSURGERY | Facility: CLINIC | Age: 76
End: 2021-08-10

## 2021-08-10 VITALS — HEIGHT: 62 IN | BODY MASS INDEX: 25.76 KG/M2 | WEIGHT: 140 LBS

## 2021-08-10 DIAGNOSIS — E66.3 OVERWEIGHT WITH BODY MASS INDEX (BMI) OF 25 TO 25.9 IN ADULT: ICD-10-CM

## 2021-08-10 DIAGNOSIS — Z78.9 NON-SMOKER: ICD-10-CM

## 2021-08-10 DIAGNOSIS — M43.16 SPONDYLOLISTHESIS OF LUMBAR REGION: Primary | ICD-10-CM

## 2021-08-10 DIAGNOSIS — M51.37 DEGENERATION OF LUMBAR OR LUMBOSACRAL INTERVERTEBRAL DISC: ICD-10-CM

## 2021-08-10 DIAGNOSIS — M48.061 SPINAL STENOSIS OF LUMBAR REGION WITHOUT NEUROGENIC CLAUDICATION: ICD-10-CM

## 2021-08-10 PROBLEM — M51.379 DEGENERATION OF LUMBAR OR LUMBOSACRAL INTERVERTEBRAL DISC: Status: ACTIVE | Noted: 2021-08-10

## 2021-08-10 PROCEDURE — 99214 OFFICE O/P EST MOD 30 MIN: CPT | Performed by: NEUROLOGICAL SURGERY

## 2021-08-10 RX ORDER — LOSARTAN POTASSIUM 100 MG/1
100 TABLET ORAL DAILY
COMMUNITY
Start: 2021-07-16

## 2021-08-10 RX ORDER — CELECOXIB 100 MG/1
100 CAPSULE ORAL 2 TIMES DAILY WITH MEALS
COMMUNITY
Start: 2021-07-06

## 2021-08-10 RX ORDER — GABAPENTIN 100 MG/1
CAPSULE ORAL
COMMUNITY
Start: 2021-06-25

## 2021-08-10 RX ORDER — LORAZEPAM 2 MG/1
TABLET ORAL
COMMUNITY
Start: 2021-07-16

## 2021-08-10 NOTE — PATIENT INSTRUCTIONS
"PATIENT TO CONTINUE TO FOLLOW UP WITH HER PRIMARY CARE PROVIDER FOR YEARLY PHYSICAL EXAMS TO ENSURE COMPLETE HEALTH MAINTENANCE      BMI for Adults  What is BMI?  Body mass index (BMI) is a number that is calculated from a person's weight and height. BMI can help estimate how much of a person's weight is composed of fat. BMI does not measure body fat directly. Rather, it is an alternative to procedures that directly measure body fat, which can be difficult and expensive.  BMI can help identify people who may be at higher risk for certain medical problems.  What are BMI measurements used for?  BMI is used as a screening tool to identify possible weight problems. It helps determine whether a person is obese, overweight, a healthy weight, or underweight.  BMI is useful for:  · Identifying a weight problem that may be related to a medical condition or may increase the risk for medical problems.  · Promoting changes, such as changes in diet and exercise, to help reach a healthy weight. BMI screening can be repeated to see if these changes are working.  How is BMI calculated?  BMI involves measuring your weight in relation to your height. Both height and weight are measured, and the BMI is calculated from those numbers. This can be done either in English (U[place] or metric measurements. Note that charts and online BMI calculators are available to help you find your BMI quickly and easily without having to do these calculations yourself.  To calculate your BMI in English (U[place] measurements:    1. Measure your weight in pounds (lb).  2. Multiply the number of pounds by 703.  ? For example, for a person who weighs 180 lb, multiply that number by 703, which equals 126,540.  3. Measure your height in inches. Then multiply that number by itself to get a measurement called \"inches squared.\"  ? For example, for a person who is 70 inches tall, the \"inches squared\" measurement is 70 inches x 70 inches, which equals 4,900 inches " "squared.  4. Divide the total from step 2 (number of lb x 703) by the total from step 3 (inches squared): 126,540 ÷ 4,900 = 25.8. This is your BMI.  To calculate your BMI in metric measurements:  1. Measure your weight in kilograms (kg).  2. Measure your height in meters (m). Then multiply that number by itself to get a measurement called \"meters squared.\"  ? For example, for a person who is 1.75 m tall, the \"meters squared\" measurement is 1.75 m x 1.75 m, which is equal to 3.1 meters squared.  3. Divide the number of kilograms (your weight) by the meters squared number. In this example: 70 ÷ 3.1 = 22.6. This is your BMI.  What do the results mean?  BMI charts are used to identify whether you are underweight, normal weight, overweight, or obese. The following guidelines will be used:  · Underweight: BMI less than 18.5.  · Normal weight: BMI between 18.5 and 24.9.  · Overweight: BMI between 25 and 29.9.  · Obese: BMI of 30 or above.  Keep these notes in mind:  · Weight includes both fat and muscle, so someone with a muscular build, such as an athlete, may have a BMI that is higher than 24.9. In cases like these, BMI is not an accurate measure of body fat.  · To determine if excess body fat is the cause of a BMI of 25 or higher, further assessments may need to be done by a health care provider.  · BMI is usually interpreted in the same way for men and women.  Where to find more information  For more information about BMI, including tools to quickly calculate your BMI, go to these websites:  · Centers for Disease Control and Prevention: www.cdc.gov  · American Heart Association: www.heart.org  · National Heart, Lung, and Blood [place]: www.nhlbi.nih.gov  Summary  · Body mass index (BMI) is a number that is calculated from a person's weight and height.  · BMI may help estimate how much of a person's weight is composed of fat. BMI can help identify those who may be at higher risk for certain medical problems.  · BMI " "can be measured using English measurements or metric measurements.  · BMI charts are used to identify whether you are underweight, normal weight, overweight, or obese.  This information is not intended to replace advice given to you by your health care provider. Make sure you discuss any questions you have with your health care provider.  Document Revised: 09/09/2020 Document Reviewed: 07/17/2020  [place] Patient Education © 2021 [employer]      https://www.nhlbi.nih.gov/files/docs/public/heart/dash_brief.pdf\">   DASH Eating Plan  DASH stands for Dietary Approaches to Stop Hypertension. The DASH eating plan is a healthy eating plan that has been shown to:  · Reduce high blood pressure (hypertension).  · Reduce your risk for type 2 diabetes, heart disease, and stroke.  · Help with weight loss.  What are tips for following this plan?  Reading food labels  · Check food labels for the amount of salt (sodium) per serving. Choose foods with less than 5 percent of the Daily Value of sodium. Generally, foods with less than 300 milligrams (mg) of sodium per serving fit into this eating plan.  · To find whole grains, look for the word \"whole\" as the first word in the ingredient list.  Shopping  · Buy products labeled as \"low-sodium\" or \"no salt added.\"  · Buy fresh foods. Avoid canned foods and pre-made or frozen meals.  Cooking  · Avoid adding salt when cooking. Use salt-free seasonings or herbs instead of table salt or sea salt. Check with your health care provider or pharmacist before using salt substitutes.  · Do not mejia foods. Cook foods using healthy methods such as baking, boiling, grilling, roasting, and broiling instead.  · Cook with heart-healthy oils, such as olive, canola, avocado, soybean, or sunflower oil.  Meal planning    · Eat a balanced diet that includes:  ? 4 or more servings of fruits and 4 or more servings of vegetables each day. Try to fill one-half of your plate with fruits and vegetables.  ? 6-8 " servings of whole grains each day.  ? Less than 6 oz (170 g) of lean meat, poultry, or fish each day. A 3-oz (85-g) serving of meat is about the same size as a deck of cards. One egg equals 1 oz (28 g).  ? 2-3 servings of low-fat dairy each day. One serving is 1 cup (237 mL).  ? 1 serving of nuts, seeds, or beans 5 times each week.  ? 2-3 servings of heart-healthy fats. Healthy fats called omega-3 fatty acids are found in foods such as walnuts, flaxseeds, fortified milks, and eggs. These fats are also found in cold-water fish, such as sardines, salmon, and mackerel.  · Limit how much you eat of:  ? Canned or prepackaged foods.  ? Food that is high in trans fat, such as some fried foods.  ? Food that is high in saturated fat, such as fatty meat.  ? Desserts and other sweets, sugary drinks, and other foods with added sugar.  ? Full-fat dairy products.  · Do not salt foods before eating.  · Do not eat more than 4 egg yolks a week.  · Try to eat at least 2 vegetarian meals a week.  · Eat more home-cooked food and less restaurant, buffet, and fast food.  Lifestyle  · When eating at a restaurant, ask that your food be prepared with less salt or no salt, if possible.  · If you drink alcohol:  ? Limit how much you use to:  § 0-1 drink a day for women who are not pregnant.  § 0-2 drinks a day for men.  ? Be aware of how much alcohol is in your drink. In the [place] one drink equals one 12 oz bottle of beer (355 mL), one 5 oz glass of wine (148 mL), or one 1½ oz glass of hard liquor (44 mL).  General information  · Avoid eating more than 2,300 mg of salt a day. If you have hypertension, you may need to reduce your sodium intake to 1,500 mg a day.  · Work with your health care provider to maintain a healthy body weight or to lose weight. Ask what an ideal weight is for you.  · Get at least 30 minutes of exercise that causes your heart to beat faster (aerobic exercise) most days of the week. Activities may include walking,  swimming, or biking.  · Work with your health care provider or dietitian to adjust your eating plan to your individual calorie needs.  What foods should I eat?  Fruits  All fresh, dried, or frozen fruit. Canned fruit in natural juice (without added sugar).  Vegetables  Fresh or frozen vegetables (raw, steamed, roasted, or grilled). Low-sodium or reduced-sodium tomato and vegetable juice. Low-sodium or reduced-sodium tomato sauce and tomato paste. Low-sodium or reduced-sodium canned vegetables.  Grains  Whole-grain or whole-wheat bread. Whole-grain or whole-wheat pasta. Brown rice. Oatmeal. Quinoa. Bulgur. Whole-grain and low-sodium cereals. Amira bread. Low-fat, low-sodium crackers. Whole-wheat flour tortillas.  Meats and other proteins  Skinless chicken or turkey. Ground chicken or turkey. Pork with fat trimmed off. Fish and seafood. Egg whites. Dried beans, peas, or lentils. Unsalted nuts, nut butters, and seeds. Unsalted canned beans. Lean cuts of beef with fat trimmed off. Low-sodium, lean precooked or cured meat, such as sausages or meat loaves.  Dairy  Low-fat (1%) or fat-free (skim) milk. Reduced-fat, low-fat, or fat-free cheeses. Nonfat, low-sodium ricotta or cottage cheese. Low-fat or nonfat yogurt. Low-fat, low-sodium cheese.  Fats and oils  Soft margarine without trans fats. Vegetable oil. Reduced-fat, low-fat, or light mayonnaise and salad dressings (reduced-sodium). Canola, safflower, olive, avocado, soybean, and sunflower oils. Avocado.  Seasonings and condiments  Herbs. Spices. Seasoning mixes without salt.  Other foods  Unsalted popcorn and pretzels. Fat-free sweets.  The items listed above may not be a complete list of foods and beverages you can eat. Contact a dietitian for more information.  What foods should I avoid?  Fruits  Canned fruit in a light or heavy syrup. Fried fruit. Fruit in cream or butter sauce.  Vegetables  Creamed or fried vegetables. Vegetables in a cheese sauce. Regular canned  vegetables (not low-sodium or reduced-sodium). Regular canned tomato sauce and paste (not low-sodium or reduced-sodium). Regular tomato and vegetable juice (not low-sodium or reduced-sodium). Pickles. Olives.  Grains  Baked goods made with fat, such as croissants, muffins, or some breads. Dry pasta or rice meal packs.  Meats and other proteins  Fatty cuts of meat. Ribs. Fried meat. Talavera. Bologna, salami, and other precooked or cured meats, such as sausages or meat loaves. Fat from the back of a pig (fatback). Bratwurst. Salted nuts and seeds. Canned beans with added salt. Canned or smoked fish. Whole eggs or egg yolks. Chicken or turkey with skin.  Dairy  Whole or 2% milk, cream, and half-and-half. Whole or full-fat cream cheese. Whole-fat or sweetened yogurt. Full-fat cheese. Nondairy creamers. Whipped toppings. Processed cheese and cheese spreads.  Fats and oils  Butter. Stick margarine. Lard. Shortening. Ghee. Talavera fat. Tropical oils, such as coconut, palm kernel, or palm oil.  Seasonings and condiments  Onion salt, garlic salt, seasoned salt, table salt, and sea salt. Worcestershire sauce. Tartar sauce. Barbecue sauce. Teriyaki sauce. Soy sauce, including reduced-sodium. Steak sauce. Canned and packaged gravies. Fish sauce. Oyster sauce. Cocktail sauce. Store-bought horseradish. Ketchup. Mustard. Meat flavorings and tenderizers. Bouillon cubes. Hot sauces. Pre-made or packaged marinades. Pre-made or packaged taco seasonings. Relishes. Regular salad dressings.  Other foods  Salted popcorn and pretzels.  The items listed above may not be a complete list of foods and beverages you should avoid. Contact a dietitian for more information.  Where to find more information  · National Heart, Lung, and Blood [place]: www.nhlbi.nih.gov  · American Heart Association: www.heart.org  · Academy of Nutrition and Dietetics: www.eatright.org  · National Kidney Foundation: www.kidney.org  Summary  · The DASH eating plan is a  healthy eating plan that has been shown to reduce high blood pressure (hypertension). It may also reduce your risk for type 2 diabetes, heart disease, and stroke.  · When on the DASH eating plan, aim to eat more fresh fruits and vegetables, whole grains, lean proteins, low-fat dairy, and heart-healthy fats.  · With the DASH eating plan, you should limit salt (sodium) intake to 2,300 mg a day. If you have hypertension, you may need to reduce your sodium intake to 1,500 mg a day.  · Work with your health care provider or dietitian to adjust your eating plan to your individual calorie needs.  This information is not intended to replace advice given to you by your health care provider. Make sure you discuss any questions you have with your health care provider.  Document Revised: 11/20/2020 Document Reviewed: 11/20/2020  [employer]vier Patient Education © 2021 [employer]

## 2021-08-10 NOTE — PROGRESS NOTES
SUBJECTIVE:  Patient ID: Dorcas Juarez is a 75 y.o. female is here today for follow-up.    Chief Complaint: Leg discomfort  Chief Complaint   Patient presents with   • BACK & LEG PAIN     patient completed therapy without any relief of her symptoms; patient had imaging @ [facility] (MRI & xrays); patient has been referred to Dr Singh (8/9/21).       HPI  75-year-old female been following for variety of complaints.  She complains of intermittent low back pain but is not daily as only when she overdoes it.  She really does not give a good description of radicular lower extremity pain.  The biggest complaint today is knee pain and cramping in her calves and feet at night.  She also describes difficulty sleeping which has been going on for several years.  She is done 4 weeks of physical therapy that did seem to help these issue.  62 mg of Ativan nightly for sleep and cramping.  She also takes gabapentin 100 mg at night.  She had no low back injections.  She has been seen by Dr. Cortez for her knee and knee replacement has been recommended.    The following portions of the patient's history were reviewed and updated as appropriate: allergies, current medications, past family history, past medical history, past social history, past surgical history and problem list.    OBJECTIVE:    Review of Systems   Musculoskeletal: Positive for arthralgias and myalgias.   All other systems reviewed and are negative.         Physical Exam  Eyes:      Extraocular Movements: EOM normal.      Pupils: Pupils are equal, round, and reactive to light.   Neurological:      Mental Status: She is oriented to person, place, and time.      Coordination: Finger-Nose-Finger Test normal.      Gait: Gait is intact.      Deep Tendon Reflexes: Strength normal.   Psychiatric:         Speech: Speech normal.         Neurologic Exam     Mental Status   Oriented to person, place, and time.   Attention: normal.   Speech: speech is normal   Level of consciousness:  alert  Knowledge: good.     Cranial Nerves     CN II   Visual fields full to confrontation.     CN III, IV, VI   Pupils are equal, round, and reactive to light.  Extraocular motions are normal.     CN V   Facial sensation intact.     CN VII   Facial expression full, symmetric.     CN VIII   CN VIII normal.     CN IX, X   CN IX normal.   CN X normal.     CN XI   CN XI normal.     CN XII   CN XII normal.     Motor Exam   Muscle bulk: normal  Overall muscle tone: normal  Right arm pronator drift: absent  Left arm pronator drift: absent    Strength   Strength 5/5 throughout.     Sensory Exam   Light touch normal.   Pinprick normal.     Gait, Coordination, and Reflexes     Gait  Gait: normal    Coordination   Finger to nose coordination: normal    Tremor   Resting tremor: absent  Intention tremor: absent  Action tremor: absent    Reflexes   Reflexes 2+ except as noted.       Independent Review of Radiographic Studies:   Plain x-rays and MRI of the lumbar spine do show some degree of lumbar stenosis and a spondylolisthesis around L4-5.    ASSESSMENT/PLAN:  The patient's complaints and symptoms really do not localize to her low back.  She is complaining of knee pain and cramping in her legs mostly at night.  She does not describe neurogenic claudication.  She says low back pain is not a big part of her problem.  The cramping that she is complaining about and the knee pain are not localizable to her low back.  I do not recommend any surgical intervention.  Should her back pain become more persistent or her symptoms more classically consistent with neurogenic claudication would be happy to see her again in the future.    1. Spondylolisthesis of lumbar region    2. Degeneration of lumbar or lumbosacral intervertebral disc    3. Spinal stenosis of lumbar region without neurogenic claudication    4. Non-smoker    5. Overweight with body mass index (BMI) of 25 to 25.9 in adult        The patient's Body mass index is 25.61  kg/m².. BMI is above normal parameters. Recommendations include: educational material    Return if symptoms worsen or fail to improve.      Alton Murray MD

## 2021-08-24 ENCOUNTER — OFFICE VISIT (OUTPATIENT)
Dept: UROLOGY | Age: 76
End: 2021-08-24
Payer: MEDICARE

## 2021-08-24 VITALS — TEMPERATURE: 97.2 F | BODY MASS INDEX: 26.39 KG/M2 | HEIGHT: 62 IN | WEIGHT: 143.4 LBS

## 2021-08-24 DIAGNOSIS — N20.0 LEFT RENAL STONE: Primary | ICD-10-CM

## 2021-08-24 DIAGNOSIS — N95.2 ATROPHIC VAGINITIS: ICD-10-CM

## 2021-08-24 DIAGNOSIS — R93.429 ABNORMAL RENAL ULTRASOUND: ICD-10-CM

## 2021-08-24 DIAGNOSIS — N39.0 RECURRENT UTI: ICD-10-CM

## 2021-08-24 LAB
APPEARANCE FLUID: CLEAR
BILIRUBIN, POC: NORMAL
BLOOD URINE, POC: NORMAL
CLARITY, POC: CLEAR
COLOR, POC: YELLOW
GLUCOSE URINE, POC: NORMAL
KETONES, POC: NORMAL
LEUKOCYTE EST, POC: NORMAL
NITRITE, POC: NORMAL
PH, POC: 7
PROTEIN, POC: NORMAL
SPECIFIC GRAVITY, POC: 1.01
UROBILINOGEN, POC: 0.2

## 2021-08-24 PROCEDURE — 99204 OFFICE O/P NEW MOD 45 MIN: CPT | Performed by: NURSE PRACTITIONER

## 2021-08-24 PROCEDURE — 81002 URINALYSIS NONAUTO W/O SCOPE: CPT | Performed by: NURSE PRACTITIONER

## 2021-08-24 RX ORDER — CELECOXIB 100 MG/1
100 CAPSULE ORAL 2 TIMES DAILY
COMMUNITY
Start: 2021-08-17

## 2021-08-24 RX ORDER — GABAPENTIN 100 MG/1
100 CAPSULE ORAL NIGHTLY
COMMUNITY
Start: 2021-06-25

## 2021-08-24 RX ORDER — FLUTICASONE PROPIONATE 50 MCG
2 SPRAY, SUSPENSION (ML) NASAL DAILY PRN
COMMUNITY

## 2021-08-24 RX ORDER — OMEPRAZOLE 20 MG/1
20 TABLET, DELAYED RELEASE ORAL DAILY
Status: ON HOLD | COMMUNITY
End: 2022-05-16 | Stop reason: HOSPADM

## 2021-08-24 RX ORDER — NAPROXEN SODIUM 220 MG
220 TABLET ORAL 2 TIMES DAILY PRN
Status: ON HOLD | COMMUNITY
End: 2022-02-02 | Stop reason: HOSPADM

## 2021-08-24 RX ORDER — MULTIVITAMIN WITH IRON
500 TABLET ORAL DAILY
COMMUNITY

## 2021-08-24 RX ORDER — CYCLOBENZAPRINE HCL 5 MG
TABLET ORAL
COMMUNITY
Start: 2021-08-13 | End: 2021-12-20

## 2021-08-24 RX ORDER — CONJUGATED ESTROGENS 0.62 MG/G
CREAM VAGINAL
COMMUNITY
Start: 2021-08-13 | End: 2021-12-20

## 2021-08-24 NOTE — PROGRESS NOTES
Sanaz Huynh is a 78 y.o. female who presents today   Chief Complaint   Patient presents with    New Patient     I am here today to establish care. I hve a history of kidney stones     Patient is a 74-year-old female who presents the clinic today to establish care. She is a previous patient of Dr. Mohamud Diego. She was transferred to female urology in [place]. She does not want to continue to make trips there. She does have a history of stones and was last treated in 2019 with a bilateral ESWL in [place]. Her last visit with him was on 5/28/2021 with a renal ultrasound. Renal ultrasound indicated a 3 mm left nonobstructing renal stone and a right ureter dilated to 7 mm. She also has a cluster of cyst the largest one being 1.46 cm in the right kidney. She was to follow-up with a CT although they never scheduled her CT nor did they schedule her follow-up. She is here now establishing care and has not had her CT. She also has a history of recurrent UTIs although since starting Premarin she is not had a UTI in approximately 2 years. Today she denies any flank pain, fever, chills, nausea, vomiting, dysuria, gross hematuria.        Past Medical History:   Diagnosis Date    Allergic rhinitis     Hypertension     Hypothyroidism     Kidney stones     Osteoarthritis        Past Surgical History:   Procedure Laterality Date    CHOLECYSTECTOMY      COLONOSCOPY  4-    COLONOSCOPY  04/13/2017    Dr Nadeem Fraire, 5 yr recall    HERNIA REPAIR      MI COLONOSCOPY FLX DX W/COLLJ SPEC WHEN PFRMD N/A 4/13/2017    COLONOSCOPY DIAGNOSTIC OR SCREENING performed by William Joy DO at [facility]    TONSILLECTOMY      TUBAL LIGATION         Current Outpatient Medications   Medication Sig Dispense Refill    celecoxib (CELEBREX) 100 MG capsule TAKE 1 CAPSULE BY MOUTH TWICE DAILY WITH FOOD      Cranberry 500 MG TABS Take 1 tablet by mouth daily      cyclobenzaprine (FLEXERIL) 5 MG tablet TAKE 1 TABLET BY MOUTH TWICE DAILY AS NEEDED FOR MUSCLE SPASMS      PREMARIN 0.625 MG/GM vaginal cream APPLY TOPICALLY IN THE VAGINA AS DIRECTED      fluticasone (FLONASE) 50 MCG/ACT nasal spray 2 sprays by Nasal route daily as needed      gabapentin (NEURONTIN) 100 MG capsule TAKE 1 TO 3 CAPSULES BY MOUTH EVERY NIGHT AT BEDTIME      magnesium (MAGNESIUM-OXIDE) 250 MG TABS tablet Take 500 mg by mouth daily      naproxen sodium (ANAPROX) 220 MG tablet Take 220 mg by mouth 2 times daily as needed      omeprazole (PRILOSEC OTC) 20 MG tablet       Omega 3-6-9 Fatty Acids (OMEGA 3-6-9 PO) Take by mouth      LORazepam (ATIVAN) 1 MG tablet Take 1 mg by mouth every 6 hours as needed for Anxiety      losartan (COZAAR) 50 MG tablet Take 50 mg by mouth daily      cetirizine (ZYRTEC) 10 MG tablet Take 10 mg by mouth daily.  Levothyroxine Sodium (LEVOXYL PO) Take 100 mcg by mouth       Multiple Vitamin (MULTIVITAMIN PO) Take  by mouth.  ondansetron (ZOFRAN ODT) 4 MG disintegrating tablet Take 1 tablet by mouth every 8 hours as needed for Nausea or Vomiting (Patient not taking: Reported on 8/24/2021) 15 tablet 0    niacin (SLO-NIACIN) 500 MG extended release tablet Take 500 mg by mouth nightly (Patient not taking: Reported on 8/24/2021)      Calcium Carb-Cholecalciferol (CALCIUM 600+D) 600-800 MG-UNIT TABS Take by mouth 2 times daily (Patient not taking: Reported on 8/24/2021)      temazepam (RESTORIL) 15 MG capsule Take 30 mg by mouth nightly as needed for Sleep. (Patient not taking: Reported on 8/24/2021)       No current facility-administered medications for this visit.        Allergies   Allergen Reactions    Sulfamethoxazole-Trimethoprim Diarrhea    Pcn [Penicillins]     Sulfa Antibiotics     Vioxx     Levofloxacin Other (See Comments)     Pt cant remember reaction       Social History     Socioeconomic History    Marital status:      Spouse name: None    Number of children: None    Years of education: None    Highest education level: None   Occupational History    None   Tobacco Use    Smoking status: Former Smoker    Smokeless tobacco: Never Used   Substance and Sexual Activity    Alcohol use: No    Drug use: No    Sexual activity: None   Other Topics Concern    None   Social History Narrative    None     Social Determinants of Health     Financial Resource Strain:     Difficulty of Paying Living Expenses:    Food Insecurity:     Worried About Running Out of Food in the Last Year:     Ran Out of Food in the Last Year:    Transportation Needs:     Lack of Transportation (Medical):  Lack of Transportation (Non-Medical):    Physical Activity:     Days of Exercise per Week:     Minutes of Exercise per Session:    Stress:     Feeling of Stress :    Social Connections:     Frequency of Communication with Friends and Family:     Frequency of Social Gatherings with Friends and Family:     Attends [employer] Services:     Active Member of Clubs or Organizations:     Attends Club or Organization Meetings:     Marital Status:    Intimate Partner Violence:     Fear of Current or Ex-Partner:     Emotionally Abused:     Physically Abused:     Sexually Abused:        Family History   Problem Relation Age of Onset    Colon Polyps Mother     Colon Cancer Neg Hx     Esophageal Cancer Neg Hx     Liver Cancer Neg Hx     Liver Disease Neg Hx     Rectal Cancer Neg Hx     Stomach Cancer Neg Hx        REVIEW OF SYSTEMS:  Review of Systems   Constitutional: Negative for chills and fever. Gastrointestinal: Negative for abdominal distention, abdominal pain, nausea and vomiting. Genitourinary: Negative for difficulty urinating, dysuria, flank pain, frequency, hematuria and urgency. Musculoskeletal: Negative for back pain and gait problem. Psychiatric/Behavioral: Negative for agitation and confusion.        PHYSICAL EXAM:  Temp 97.2 °F (36.2 °C) (Temporal)   Ht 5' 2\" (1.575 m)   Wt 143 lb 6.4 oz negative. Orders Placed This Encounter   Procedures    CT ABDOMEN PELVIS WO CONTRAST Additional Contrast? None     For renal stone protocol     Standing Status:   Future     Standing Expiration Date:   8/24/2022     Scheduling Instructions: For renal stone protocol     Order Specific Question:   Additional Contrast?     Answer:   None     Order Specific Question:   Reason for exam:     Answer:   renal colic for stone protocol    POCT Urinalysis no Micro        Return in about 1 week (around 8/31/2021) for CT prior. All information inputted into the note by the MA to include chief complaint, past medical history, past surgical history, medications, allergies, social and family history and review of systems has been reviewed and updated as needed by me. EMR Dragon/transcription disclaimer: Much of this documentt is electronic  transcription/translation of spoken language to printed text. The  electronic translation of spoken language may be erroneous, or at times,  nonsensical words or phrases may be inadvertently transcribed.  Although I  have reviewed the document for such errors, some may still exist.

## 2021-08-27 ASSESSMENT — ENCOUNTER SYMPTOMS
BACK PAIN: 0
ABDOMINAL PAIN: 0
NAUSEA: 0
VOMITING: 0
ABDOMINAL DISTENTION: 0

## 2021-09-01 ENCOUNTER — HOSPITAL ENCOUNTER (OUTPATIENT)
Dept: CT IMAGING | Age: 76
Discharge: HOME | End: 2021-09-01
Payer: MEDICARE

## 2021-09-01 ENCOUNTER — OFFICE VISIT (OUTPATIENT)
Dept: UROLOGY | Age: 76
End: 2021-09-01
Payer: MEDICARE

## 2021-09-01 VITALS — WEIGHT: 143.2 LBS | TEMPERATURE: 97.3 F | HEIGHT: 62 IN | BODY MASS INDEX: 26.35 KG/M2

## 2021-09-01 DIAGNOSIS — R93.429 ABNORMAL RENAL ULTRASOUND: ICD-10-CM

## 2021-09-01 DIAGNOSIS — Z87.442 HISTORY OF KIDNEY STONES: ICD-10-CM

## 2021-09-01 DIAGNOSIS — N28.1 RENAL CYST, RIGHT: ICD-10-CM

## 2021-09-01 DIAGNOSIS — N95.2 ATROPHIC VAGINITIS: ICD-10-CM

## 2021-09-01 DIAGNOSIS — R33.9 INCOMPLETE BLADDER EMPTYING: ICD-10-CM

## 2021-09-01 DIAGNOSIS — N39.0 RECURRENT UTI: Primary | ICD-10-CM

## 2021-09-01 DIAGNOSIS — N20.0 LEFT RENAL STONE: ICD-10-CM

## 2021-09-01 PROCEDURE — 74176 CT ABD & PELVIS W/O CONTRAST: CPT

## 2021-09-01 PROCEDURE — 99214 OFFICE O/P EST MOD 30 MIN: CPT | Performed by: NURSE PRACTITIONER

## 2021-09-01 PROCEDURE — 51798 US URINE CAPACITY MEASURE: CPT | Performed by: NURSE PRACTITIONER

## 2021-09-01 PROCEDURE — 81002 URINALYSIS NONAUTO W/O SCOPE: CPT | Performed by: NURSE PRACTITIONER

## 2021-09-01 RX ORDER — NICOTINE POLACRILEX 2 MG
1 GUM BUCCAL NIGHTLY
COMMUNITY
Start: 2021-08-30

## 2021-09-01 RX ORDER — TAMSULOSIN HYDROCHLORIDE 0.4 MG/1
0.4 CAPSULE ORAL DAILY
Qty: 30 CAPSULE | Refills: 5 | Status: SHIPPED | OUTPATIENT
Start: 2021-09-01 | End: 2021-12-20 | Stop reason: SINTOL

## 2021-09-01 ASSESSMENT — ENCOUNTER SYMPTOMS
ABDOMINAL DISTENTION: 0
VOMITING: 0
BACK PAIN: 0
ABDOMINAL PAIN: 0
NAUSEA: 0

## 2021-09-01 NOTE — PROGRESS NOTES
Masha Gallegos is a 78 y.o. female who presents today   Chief Complaint   Patient presents with    Follow-up     I am here today to discuss my CT     Patient is a 81-year-old female presents the clinic today for follow-up with CT prior. She is a previous patient of Dr. Teresa Núñez and was transferred to female urology [place]. She has a history of bilateral ESWL in 2019. Her last visit with them was on 5/28/2021 with an abnormal renal ultrasound indicating a 3 mm left nonobstructing renal stone and a right dilated ureter as well as right renal cyst.  Repeat CT obtained revealed bilateral extrarenal pelvis, no dilation of either ureter and a right renal cyst.  No nonobstructing renal calculi are noted in either kidney nor in the course of either ureter. Also has a history of recurrent UTIs although since starting Premarin she has not had a UTI within the last 2 years. She does have complaints of incomplete bladder emptying for the past several months. She feels as if she has to strain with some hesitancy with each void. She tells me she has had a bladder tack in the past.  Denies any prolapse.     Past Medical History:   Diagnosis Date    Allergic rhinitis     Hypertension     Hypothyroidism     Kidney stones     Osteoarthritis        Past Surgical History:   Procedure Laterality Date    CHOLECYSTECTOMY      COLONOSCOPY  4-    COLONOSCOPY  04/13/2017    Dr Annabel Meadows, 5 yr recall    HERNIA REPAIR      OH COLONOSCOPY FLX DX W/COLLJ SPEC WHEN PFRMD N/A 4/13/2017    COLONOSCOPY DIAGNOSTIC OR SCREENING performed by Lucie Novak DO at [facility]    TONSILLECTOMY      TUBAL LIGATION         Current Outpatient Medications   Medication Sig Dispense Refill    WAL-ZYR D 5-120 MG per extended release tablet TAKE 1 TABLET BY MOUTH EVERY DAY      tamsulosin (FLOMAX) 0.4 MG capsule Take 1 capsule by mouth daily 30 capsule 5    celecoxib (CELEBREX) 100 MG capsule TAKE 1 CAPSULE BY MOUTH TWICE DAILY WITH FOOD      Cranberry 500 MG TABS Take 1 tablet by mouth daily      cyclobenzaprine (FLEXERIL) 5 MG tablet TAKE 1 TABLET BY MOUTH TWICE DAILY AS NEEDED FOR MUSCLE SPASMS      PREMARIN 0.625 MG/GM vaginal cream APPLY TOPICALLY IN THE VAGINA AS DIRECTED      fluticasone (FLONASE) 50 MCG/ACT nasal spray 2 sprays by Nasal route daily as needed      gabapentin (NEURONTIN) 100 MG capsule TAKE 1 TO 3 CAPSULES BY MOUTH EVERY NIGHT AT BEDTIME      magnesium (MAGNESIUM-OXIDE) 250 MG TABS tablet Take 500 mg by mouth daily      naproxen sodium (ANAPROX) 220 MG tablet Take 220 mg by mouth 2 times daily as needed      omeprazole (PRILOSEC OTC) 20 MG tablet       ondansetron (ZOFRAN ODT) 4 MG disintegrating tablet Take 1 tablet by mouth every 8 hours as needed for Nausea or Vomiting 15 tablet 0    niacin (SLO-NIACIN) 500 MG extended release tablet Take 500 mg by mouth nightly       Calcium Carb-Cholecalciferol (CALCIUM 600+D) 600-800 MG-UNIT TABS Take by mouth 2 times daily       Omega 3-6-9 Fatty Acids (OMEGA 3-6-9 PO) Take by mouth      LORazepam (ATIVAN) 1 MG tablet Take 1 mg by mouth every 6 hours as needed for Anxiety      losartan (COZAAR) 50 MG tablet Take 50 mg by mouth daily      temazepam (RESTORIL) 15 MG capsule Take 30 mg by mouth nightly as needed for Sleep.  cetirizine (ZYRTEC) 10 MG tablet Take 10 mg by mouth daily.  Levothyroxine Sodium (LEVOXYL PO) Take 100 mcg by mouth       Multiple Vitamin (MULTIVITAMIN PO) Take  by mouth. No current facility-administered medications for this visit.        Allergies   Allergen Reactions    Sulfamethoxazole-Trimethoprim Diarrhea    Pcn [Penicillins]     Sulfa Antibiotics     Vioxx     Levofloxacin Other (See Comments)     Pt cant remember reaction       Social History     Socioeconomic History    Marital status:      Spouse name: None    Number of children: None    Years of education: None    Highest education level: None   Occupational History    None   Tobacco Use    Smoking status: Former Smoker    Smokeless tobacco: Never Used   Substance and Sexual Activity    Alcohol use: No    Drug use: No    Sexual activity: None   Other Topics Concern    None   Social History Narrative    None     Social Determinants of Health     Financial Resource Strain:     Difficulty of Paying Living Expenses:    Food Insecurity:     Worried About Running Out of Food in the Last Year:     Ran Out of Food in the Last Year:    Transportation Needs:     Lack of Transportation (Medical):  Lack of Transportation (Non-Medical):    Physical Activity:     Days of Exercise per Week:     Minutes of Exercise per Session:    Stress:     Feeling of Stress :    Social Connections:     Frequency of Communication with Friends and Family:     Frequency of Social Gatherings with Friends and Family:     Attends [employer] Services:     Active Member of Clubs or Organizations:     Attends Club or Organization Meetings:     Marital Status:    Intimate Partner Violence:     Fear of Current or Ex-Partner:     Emotionally Abused:     Physically Abused:     Sexually Abused:        Family History   Problem Relation Age of Onset    Colon Polyps Mother     Colon Cancer Neg Hx     Esophageal Cancer Neg Hx     Liver Cancer Neg Hx     Liver Disease Neg Hx     Rectal Cancer Neg Hx     Stomach Cancer Neg Hx        REVIEW OF SYSTEMS:  Review of Systems   Constitutional: Negative for chills and fever. Gastrointestinal: Negative for abdominal distention, abdominal pain, nausea and vomiting. Genitourinary: Positive for difficulty urinating (Incomplete emptying). Negative for dysuria, flank pain, frequency, hematuria and urgency. Musculoskeletal: Negative for back pain and gait problem. Psychiatric/Behavioral: Negative for agitation and confusion.        PHYSICAL EXAM:  Temp 97.3 °F (36.3 °C) (Temporal)   Ht 5' 2\" (1.575 m)   Wt 143 lb 3.2 oz (65 kg)   BMI 26.19 kg/m²   Physical Exam  Vitals and nursing note reviewed. Constitutional:       General: She is not in acute distress. Appearance: Normal appearance. She is not ill-appearing. Pulmonary:      Effort: Pulmonary effort is normal. No respiratory distress. Abdominal:      General: There is no distension. Tenderness: There is no abdominal tenderness. There is no right CVA tenderness or left CVA tenderness. Neurological:      Mental Status: She is alert and oriented to person, place, and time. Mental status is at baseline. Psychiatric:         Mood and Affect: Mood normal.         Behavior: Behavior normal.         DATA:    Results for orders placed or performed in visit on 09/01/21   POCT Urinalysis no Micro   Result Value Ref Range    Color, UA YELLOW     Clarity, UA CLEAR     Glucose, UA POC NEG     Bilirubin, UA NEG     Ketones, UA NEG     Spec Grav, UA 1.010     Blood, UA POC NEG     pH, UA 7     Protein, UA POC NEG     Urobilinogen, UA 0.2     Leukocytes, UA NEG     Nitrite, UA NEG     Appearance, Fluid Clear Clear, Slightly Cloudy       IMAGING:   Repeat CT obtained revealed bilateral extrarenal pelvis, no dilation of either ureter and a right renal cyst.  No nonobstructing renal calculi are noted in either kidney nor in the course of either ureter    Bladder Scan interpretation  Estimation of residual urine via abdominal ultrasound  Residual Urine: 94 ml  Indication: Frequency  Position: Supine  Examination: Incremental scanning of the suprapubic area using 3 MHz transducer using copious amounts of acoustic gel. Findings: An anechoic area was demonstrated which represented the bladder, with measurement of residual urine as noted. 1. Recurrent UTI  2. Atrophic vaginitis  No UTI within the last 2 years. UA did appear negative today. We will continue Premarin at this time. Asymptomatic    3. History of kidney stones  4.  Abnormal renal ultrasound  Patient has a long history of stones. Last renal ultrasound was abnormal.  I did obtain a CT prior to this visit. No obstructing stone nor nonobstructing stone noted in either kidney. Dilated ureter is not seen on CT.  - POCT Urinalysis no Micro    4. Incomplete bladder emptying  Complaints of incomplete bladder emptying, straining, hesitancy. Bladder scan revealed 94 mL PVR. She is not emptying her bladder well. She is allergic to sulfa antibiotics although her reaction was diarrhea. We will try Flomax at this time. We did discuss if she has any sort of side effect to stop immediately and let us know. We will have her start on Flomax daily for 90 days and follow-up in office. If bladder scan remains elevated and she continues to have symptoms of incomplete emptying may do pelvic exam for evaluation of prolapse. - GA Measure, post-void residual, US, non-imaging  - tamsulosin (FLOMAX) 0.4 MG capsule; Take 1 capsule by mouth daily  Dispense: 30 capsule; Refill: 5  - POCT Urinalysis no Micro    5. Renal cyst, right  Right renal cyst noted on CT. This does appear cystic. No further imaging needed at this time. - POCT Urinalysis no Micro      Orders Placed This Encounter   Procedures    POCT Urinalysis no Micro    GA Measure, post-void residual, US, non-imaging        Return in about 3 months (around 12/1/2021). All information inputted into the note by the MA to include chief complaint, past medical history, past surgical history, medications, allergies, social and family history and review of systems has been reviewed and updated as needed by me. EMR Dragon/transcription disclaimer: Much of this documentt is electronic  transcription/translation of spoken language to printed text. The  electronic translation of spoken language may be erroneous, or at times,  nonsensical words or phrases may be inadvertently transcribed.  Although I  have reviewed the document for such errors, some may still exist.

## 2021-09-04 ENCOUNTER — HOSPITAL ENCOUNTER (EMERGENCY)
Facility: HOSPITAL | Age: 76
Discharge: HOME | End: 2021-09-04
Attending: STUDENT IN AN ORGANIZED HEALTH CARE EDUCATION/TRAINING PROGRAM | Admitting: STUDENT IN AN ORGANIZED HEALTH CARE EDUCATION/TRAINING PROGRAM

## 2021-09-04 ENCOUNTER — APPOINTMENT (OUTPATIENT)
Dept: CT IMAGING | Facility: HOSPITAL | Age: 76
End: 2021-09-04

## 2021-09-04 ENCOUNTER — APPOINTMENT (OUTPATIENT)
Dept: GENERAL RADIOLOGY | Facility: HOSPITAL | Age: 76
End: 2021-09-04

## 2021-09-04 VITALS
SYSTOLIC BLOOD PRESSURE: 126 MMHG | HEIGHT: 62 IN | WEIGHT: 143 LBS | HEART RATE: 99 BPM | DIASTOLIC BLOOD PRESSURE: 77 MMHG | BODY MASS INDEX: 26.31 KG/M2 | RESPIRATION RATE: 16 BRPM | TEMPERATURE: 97.5 F | OXYGEN SATURATION: 99 %

## 2021-09-04 DIAGNOSIS — R55 SYNCOPE, UNSPECIFIED SYNCOPE TYPE: ICD-10-CM

## 2021-09-04 DIAGNOSIS — R77.8 ELEVATED TROPONIN: ICD-10-CM

## 2021-09-04 DIAGNOSIS — R53.1 WEAKNESS GENERALIZED: Primary | ICD-10-CM

## 2021-09-04 LAB
ALBUMIN SERPL-MCNC: 3.7 G/DL (ref 3.5–5.2)
ALBUMIN/GLOB SERPL: 1.2 G/DL
ALP SERPL-CCNC: 104 U/L (ref 39–117)
ALT SERPL W P-5'-P-CCNC: 21 U/L (ref 1–33)
ANION GAP SERPL CALCULATED.3IONS-SCNC: 8 MMOL/L (ref 5–15)
APTT PPP: 25.6 SECONDS (ref 24.1–35)
AST SERPL-CCNC: 27 U/L (ref 1–32)
BASOPHILS # BLD AUTO: 0.08 10*3/MM3 (ref 0–0.2)
BASOPHILS NFR BLD AUTO: 1 % (ref 0–1.5)
BILIRUB SERPL-MCNC: 0.4 MG/DL (ref 0–1.2)
BILIRUB UR QL STRIP: NEGATIVE
BUN SERPL-MCNC: 18 MG/DL (ref 8–23)
BUN/CREAT SERPL: 15.5 (ref 7–25)
CALCIUM SPEC-SCNC: 9.2 MG/DL (ref 8.6–10.5)
CHLORIDE SERPL-SCNC: 101 MMOL/L (ref 98–107)
CLARITY UR: CLEAR
CO2 SERPL-SCNC: 24 MMOL/L (ref 22–29)
COLOR UR: ABNORMAL
CREAT SERPL-MCNC: 1.16 MG/DL (ref 0.57–1)
D DIMER PPP FEU-MCNC: 1.16 MG/L (FEU) (ref 0–0.5)
DEPRECATED RDW RBC AUTO: 44.9 FL (ref 37–54)
EOSINOPHIL # BLD AUTO: 0.47 10*3/MM3 (ref 0–0.4)
EOSINOPHIL NFR BLD AUTO: 6.1 % (ref 0.3–6.2)
ERYTHROCYTE [DISTWIDTH] IN BLOOD BY AUTOMATED COUNT: 13.2 % (ref 12.3–15.4)
GFR SERPL CREATININE-BSD FRML MDRD: 46 ML/MIN/1.73
GLOBULIN UR ELPH-MCNC: 3.1 GM/DL
GLUCOSE SERPL-MCNC: 107 MG/DL (ref 65–99)
GLUCOSE UR STRIP-MCNC: NEGATIVE MG/DL
HCT VFR BLD AUTO: 34.5 % (ref 34–46.6)
HGB BLD-MCNC: 11.6 G/DL (ref 12–15.9)
HGB UR QL STRIP.AUTO: NEGATIVE
IMM GRANULOCYTES # BLD AUTO: 0.02 10*3/MM3 (ref 0–0.05)
IMM GRANULOCYTES NFR BLD AUTO: 0.3 % (ref 0–0.5)
INR PPP: 1 (ref 0.91–1.09)
KETONES UR QL STRIP: NEGATIVE
LEUKOCYTE ESTERASE UR QL STRIP.AUTO: NEGATIVE
LYMPHOCYTES # BLD AUTO: 1.87 10*3/MM3 (ref 0.7–3.1)
LYMPHOCYTES NFR BLD AUTO: 24.2 % (ref 19.6–45.3)
MCH RBC QN AUTO: 30.9 PG (ref 26.6–33)
MCHC RBC AUTO-ENTMCNC: 33.6 G/DL (ref 31.5–35.7)
MCV RBC AUTO: 91.8 FL (ref 79–97)
MONOCYTES # BLD AUTO: 0.77 10*3/MM3 (ref 0.1–0.9)
MONOCYTES NFR BLD AUTO: 9.9 % (ref 5–12)
NEUTROPHILS NFR BLD AUTO: 4.53 10*3/MM3 (ref 1.7–7)
NEUTROPHILS NFR BLD AUTO: 58.5 % (ref 42.7–76)
NITRITE UR QL STRIP: NEGATIVE
NRBC BLD AUTO-RTO: 0 /100 WBC (ref 0–0.2)
PH UR STRIP.AUTO: 7 [PH] (ref 5–8)
PLATELET # BLD AUTO: 200 10*3/MM3 (ref 140–450)
PMV BLD AUTO: 9.4 FL (ref 6–12)
POTASSIUM SERPL-SCNC: 4.6 MMOL/L (ref 3.5–5.2)
PROT SERPL-MCNC: 6.8 G/DL (ref 6–8.5)
PROT UR QL STRIP: NEGATIVE
PROTHROMBIN TIME: 12.4 SECONDS (ref 11.5–13.4)
RBC # BLD AUTO: 3.76 10*6/MM3 (ref 3.77–5.28)
SODIUM SERPL-SCNC: 133 MMOL/L (ref 136–145)
SP GR UR STRIP: <=1.005 (ref 1–1.03)
TROPONIN T SERPL-MCNC: 0.05 NG/ML (ref 0–0.03)
TROPONIN T SERPL-MCNC: 0.07 NG/ML (ref 0–0.03)
UROBILINOGEN UR QL STRIP: ABNORMAL
WBC # BLD AUTO: 7.74 10*3/MM3 (ref 3.4–10.8)

## 2021-09-04 PROCEDURE — 84484 ASSAY OF TROPONIN QUANT: CPT | Performed by: STUDENT IN AN ORGANIZED HEALTH CARE EDUCATION/TRAINING PROGRAM

## 2021-09-04 PROCEDURE — 85610 PROTHROMBIN TIME: CPT | Performed by: STUDENT IN AN ORGANIZED HEALTH CARE EDUCATION/TRAINING PROGRAM

## 2021-09-04 PROCEDURE — 85379 FIBRIN DEGRADATION QUANT: CPT | Performed by: STUDENT IN AN ORGANIZED HEALTH CARE EDUCATION/TRAINING PROGRAM

## 2021-09-04 PROCEDURE — 81003 URINALYSIS AUTO W/O SCOPE: CPT | Performed by: STUDENT IN AN ORGANIZED HEALTH CARE EDUCATION/TRAINING PROGRAM

## 2021-09-04 PROCEDURE — 85025 COMPLETE CBC W/AUTO DIFF WBC: CPT | Performed by: STUDENT IN AN ORGANIZED HEALTH CARE EDUCATION/TRAINING PROGRAM

## 2021-09-04 PROCEDURE — 71275 CT ANGIOGRAPHY CHEST: CPT

## 2021-09-04 PROCEDURE — 80053 COMPREHEN METABOLIC PANEL: CPT | Performed by: STUDENT IN AN ORGANIZED HEALTH CARE EDUCATION/TRAINING PROGRAM

## 2021-09-04 PROCEDURE — 71045 X-RAY EXAM CHEST 1 VIEW: CPT

## 2021-09-04 PROCEDURE — 93010 ELECTROCARDIOGRAM REPORT: CPT | Performed by: INTERNAL MEDICINE

## 2021-09-04 PROCEDURE — 93005 ELECTROCARDIOGRAM TRACING: CPT | Performed by: STUDENT IN AN ORGANIZED HEALTH CARE EDUCATION/TRAINING PROGRAM

## 2021-09-04 PROCEDURE — 0 IOPAMIDOL PER 1 ML: Performed by: STUDENT IN AN ORGANIZED HEALTH CARE EDUCATION/TRAINING PROGRAM

## 2021-09-04 PROCEDURE — 99284 EMERGENCY DEPT VISIT MOD MDM: CPT

## 2021-09-04 PROCEDURE — 85730 THROMBOPLASTIN TIME PARTIAL: CPT | Performed by: STUDENT IN AN ORGANIZED HEALTH CARE EDUCATION/TRAINING PROGRAM

## 2021-09-04 RX ORDER — CYCLOBENZAPRINE HCL 5 MG
5 TABLET ORAL 2 TIMES DAILY PRN
COMMUNITY

## 2021-09-04 RX ORDER — ASPIRIN 325 MG
325 TABLET, DELAYED RELEASE (ENTERIC COATED) ORAL ONCE
Status: COMPLETED | OUTPATIENT
Start: 2021-09-04 | End: 2021-09-04

## 2021-09-04 RX ADMIN — IOPAMIDOL 89 ML: 755 INJECTION, SOLUTION INTRAVENOUS at 15:15

## 2021-09-04 RX ADMIN — ASPIRIN 325 MG: 325 TABLET, COATED ORAL at 15:06

## 2021-09-04 RX ADMIN — SODIUM CHLORIDE, POTASSIUM CHLORIDE, SODIUM LACTATE AND CALCIUM CHLORIDE 1000 ML: 600; 310; 30; 20 INJECTION, SOLUTION INTRAVENOUS at 13:59

## 2021-09-04 NOTE — DISCHARGE INSTRUCTIONS
You were evaluated in the ER for weakness. You also had an incidentally elevated troponin level that did not change significantly. You will need a stress test but as we discussed you elected to have this done as an outpatient. Your workup showed no indication at this time for admission to the hospital. Please use fluids for symptomatic improvement. Watch your symptoms at home carefully. PLEASE CALL YOUR PCP AS SOON AS YOU CAN TO SET UP AN OUTPATIENT STRESS TEST.    It is VERY IMPORTANT that you follow up (call them to set up an appointment) with your primary care doctor* within the next few days or as soon as possible so that you can be re-evaluated for improvement in your symptoms or for any other questions. If you were prescribed any medications, please take them as directed or call us back with any questions.     Return to the ER within 24-48 hours if you have any new symptoms, worsening symptoms, or any other concerns.

## 2021-09-04 NOTE — ED PROVIDER NOTES
Subjective   States that she was talking to her family and friends when she felt as if she was feeling weak and tired.  States that she needed to sit down and after she sat down she was told that she would need to be checked out.  When EMS arrived they gave her some fluids and she felt a lot better.  States that she recently started Flomax for urinary retention.  States that she has not had enough water to drink as she normally does today because she has been busy talking to her family.  States that prior to her feeling weak she did not have any chest pain, shortness of breath, palpitations, dizziness or other symptoms.  States that she currently feels 100% back to normal and does not have any symptoms.  States that she did not fall or hit her head recently.  Denies any severe headache or dizziness.          Review of Systems   All other systems reviewed and are negative.      Past Medical History:   Diagnosis Date   • Abdominal pain    • Anticoagulant-induced hematuria    • Anxiety    • Arthritis    • Bilateral leg cramps    • Bilateral leg pain    • Calculus of kidney     LEFT   • Clot hematuria    • Cystocele with rectocele    • Dysuria    • Hypertension    • Hypothyroidism    • Rectocele    • Urgency of urination    • UTI (urinary tract infection) with pyuria    • Venous insufficiency of both lower extremities        Allergies   Allergen Reactions   • Bactrim [Sulfamethoxazole-Trimethoprim] Diarrhea   • Penicillins Hives   • Vioxx [Rofecoxib] Diarrhea   • Levaquin [Levofloxacin] Unknown (See Comments)     Pt cant remember reaction   • Sulfa Antibiotics Itching       Past Surgical History:   Procedure Laterality Date   • ANTERIOR AND POSTERIOR VAGINAL REPAIR N/A 8/13/2018    Procedure: ANTERIOR AND POSTERIOR VAGINAL REPAIR;  Surgeon: Arlene Ocasio MD;  Location: [facility];  Service: Obstetrics/Gynecology   • CATARACT EXTRACTION W/ INTRAOCULAR LENS IMPLANT     • CHOLECYSTECTOMY     • COLONOSCOPY N/A 6/7/2019     Procedure: COLONOSCOPY WITH ANESTHESIA;  Surgeon: Armin Cleaning DO;  Location: [facility] ENDOSCOPY;  Service: Gastroenterology   • CYSTOSCOPY  2002   • ENDOSCOPY N/A 2019    Procedure: ESOPHAGOGASTRODUODENOSCOPY WITH ANESTHESIA;  Surgeon: Armin Cleaning DO;  Location: [facility] ENDOSCOPY;  Service: Gastroenterology   • HERNIA REPAIR     • LAPAROSCOPIC TUBAL LIGATION     • TONSILLECTOMY     • VARICOSE VEIN SURGERY Right 2018    Procedure: RIGHT SAPHENOUS VEIN RADIO FREQUENCY ABLATION;  Surgeon: Rolando Anguiano DO;  Location: [facility] HYBRID OR 12;  Service: Vascular       Family History   Problem Relation Age of Onset   • Prostate cancer Father    • Colon polyps Mother    • Kidney disease Mother    • Colon cancer Neg Hx    • Breast cancer Neg Hx        Social History     Socioeconomic History   • Marital status:      Spouse name: Not on file   • Number of children: Not on file   • Years of education: Not on file   • Highest education level: Not on file   Tobacco Use   • Smoking status: Former Smoker     Years: 30.00     Types: Cigarettes     Quit date:      Years since quittin.   • Smokeless tobacco: Never Used   Vaping Use   • Vaping Use: Never used   Substance and Sexual Activity   • Alcohol use: No   • Drug use: No   • Sexual activity: Defer           Objective   Physical Exam  Vitals and nursing note reviewed.   Constitutional:       General: She is not in acute distress.     Appearance: She is well-developed. She is not diaphoretic.   HENT:      Head: Normocephalic and atraumatic.   Eyes:      General:         Right eye: No discharge.         Left eye: No discharge.      Conjunctiva/sclera: Conjunctivae normal.   Neck:      Vascular: No JVD.      Trachea: No tracheal deviation.   Cardiovascular:      Rate and Rhythm: Normal rate and regular rhythm.      Heart sounds: Normal heart sounds. No murmur heard.   No friction rub. No gallop.    Pulmonary:      Effort: Pulmonary effort  is normal.      Breath sounds: Normal breath sounds. No stridor.   Abdominal:      General: Bowel sounds are normal. There is no distension.      Palpations: Abdomen is soft.      Tenderness: There is no abdominal tenderness.   Musculoskeletal:         General: No deformity. Normal range of motion.      Cervical back: Normal range of motion and neck supple.   Skin:     General: Skin is warm and dry.      Capillary Refill: Capillary refill takes less than 2 seconds.      Coloration: Skin is not pale.      Findings: No rash.   Neurological:      Mental Status: She is alert.   Psychiatric:         Behavior: Behavior normal.         Procedures           ED Course  ED Course as of Sep 09 1621   Sat Sep 04, 2021   1427 D-dimer, Quantitative(!) [NP]   1427 Troponin(!!) [NP]   1626 Had a very lengthy discussion with the patient and her daughter who is also at bedside.  Patient currently feels great and has not had any episodes of dizziness, chest pain, shortness of breath or any numbness or tingling.  States that she has no pain anywhere and she wants to go home.  I told her that her troponin was incidentally found to be elevated and there are multiple things that this could be caused by.  However she is of age and has risk factors that would may be needed stress test.  They requested an outpatient stress test and they feel comfortable watching her symptoms at home. I went over the risks and benefits of an outpatient stress test vs observation admission with a stress test possibly tomorrow. They again requested an outpatient stress test as she currently feels great. They are also new to the flomax and think her weakness earlier could be due to dehydration.    [NP]      ED Course User Index  [NP] Jeff Sharp MD                                           MDM  Number of Diagnoses or Management Options  Elevated troponin  Syncope, unspecified syncope type  Weakness generalized  Diagnosis management comments: This is a 76yoF  presenting with hypotension and weakness. Patient is currently not hypotensive. Patient arrived hemodynamically stable and was afebrile. Patient placed on the monitor and IV access was established. Glucose within normal limits. No evidence of shock on exam. EKG was reviewed and had no overt evidence of STEMI or malignant dysrhythmia.     Differentials include, but are not limited to, COPD exacerbation, CHF exacerbation, acute PE, infectious etiology, ACS.     However, I have low concern for acute cardiac etiologies including ACS (EKG showed no evidence of STEMI, new ischemic changes, or malignant dysrhythmias and unremarkable trop) or pericardial effusion / tamponade (no JVD, muffled heart sounds, or electrical alternans on EKG).     I also have low concern for other non-cardiopulmonary causes including, but no limited to, toxidromes or neurologic causes (i.e. demyelinating diseases).    Plan to obtain cbc, cmp, ekg, troponin, cta chest, control symptoms, and reassess.    The imaging and other workup was reviewed and found to have evidence of an elevated troponin.     I reassessed the patient and discussed the findings of the work up so far. CTA chest did not show a pulmonary embolus. I explained my impression of the workup to her and her daughter and addressed all of their questions regarding the emergency department evaluation, diagnosis, and treatment plan in plain and simple language that they were able to understand. I spent a long time going over the risks, benefits, and alternatives to being admitted for a stress test and further workup however they elected to follow this up as an outpatient as she currently feels great and wants to go back home to be with her visiting relatives.    She voiced agreement with the plan of care so far and had no further questions. I told her that there is always some diagnostic uncertainty in the ER and that her work up, physical exam, and even her current presentation may not  always reveal other underlying conditions. I also went over the fact that her condition may change or show itself after being discharged. She expressed understanding and agreed that there are reasonable limitations with the practice of emergency medicine.    I gave her return precautions and told her to return to the emergency department within 24 - 48hrs if she has any new, worsening, or concerning symptoms. I told her that it is VERY IMPORTANT that she follows up (by calling to set up an appointment) with her primary care doctor within the next few days or as soon as reasonably possible so that she can be re-evaluated for improvement in her symptoms or for any other questions. She verbalized understanding of these instructions.     She was discharged in stable condition and was observed ambulating out of the ER.           Amount and/or Complexity of Data Reviewed  Clinical lab tests: reviewed and ordered  Tests in the radiology section of CPT®: reviewed and ordered  Tests in the medicine section of CPT®: reviewed        Final diagnoses:   Weakness generalized   Elevated troponin   Syncope, unspecified syncope type       ED Disposition  ED Disposition     ED Disposition Condition Comment    Discharge Stable           Hair Echols MD  [address] [id number] [phone]    Call in 1 day  As needed, If symptoms worsen         Medication List      Changed    conjugated estrogens 0.625 MG/GM vaginal cream  Commonly known as: Premarin  Insert  into the vagina Daily.  What changed:   · how much to take  · when to take this             Jeff Sharp MD  09/09/21 9033

## 2021-09-06 LAB
QT INTERVAL: 386 MS
QTC INTERVAL: 442 MS

## 2021-09-10 ENCOUNTER — TELEPHONE (OUTPATIENT)
Dept: UROLOGY | Age: 76
End: 2021-09-10

## 2021-09-10 RX ORDER — SILODOSIN 4 MG/1
4 CAPSULE ORAL EVERY EVENING
Qty: 30 CAPSULE | Refills: 3 | Status: SHIPPED | OUTPATIENT
Start: 2021-09-10 | End: 2021-12-20 | Stop reason: SDUPTHER

## 2021-09-10 NOTE — TELEPHONE ENCOUNTER
Patient called said she had started Flomax at lst visit. Patient ended up in ER due to low BP and almost passed out. Pt advised to stop Flomax. Patient wants to know if [facility] would want to send her in anything different.  Will send to [facility], once reviewed MA will call pt

## 2021-09-13 ENCOUNTER — CLINICAL DOCUMENTATION (OUTPATIENT)
Dept: UROLOGY | Age: 76
End: 2021-09-13

## 2021-09-13 NOTE — PROGRESS NOTES
Called pt left message on vm that a new medication Rapaflo 4 mg daily was sent into [address]   [address]. I stated to stop taking Flomax.

## 2021-09-22 ENCOUNTER — TELEPHONE (OUTPATIENT)
Dept: UROLOGY | Age: 76
End: 2021-09-22

## 2021-09-22 NOTE — TELEPHONE ENCOUNTER
I have tried calling patient and keep getting a busy signal, if she calls back please let her know she does not have to take either medication if she is concerned about side effects.  We will just recheck her at her follow up appointment

## 2021-10-14 NOTE — ANESTHESIA PREPROCEDURE EVALUATION
Anesthesia Evaluation     Patient summary reviewed   no history of anesthetic complications:  NPO Solid Status: > 8 hours  NPO Liquid Status: > 8 hours           Airway   Mallampati: I  TM distance: >3 FB  Neck ROM: full  No difficulty expected  Dental - normal exam     Pulmonary    (-) asthma, sleep apnea, not a smoker  Cardiovascular     ECG reviewed    (+) hypertension, PVD (varicose veins),   (-) past MI, dysrhythmias, cardiac stents      Neuro/Psych  (-) seizures, TIA, CVA  GI/Hepatic/Renal/Endo    (+)   hypothyroidism,   (-) liver disease, no renal disease, diabetes    ROS Comment: Chronic UTIs    Musculoskeletal     Abdominal    Substance History      OB/GYN          Other                        Anesthesia Plan    ASA 2     general   total IV anesthesia  intravenous induction   Anesthetic plan, all risks, benefits, and alternatives have been provided, discussed and informed consent has been obtained with: patient.       .

## 2021-12-20 ENCOUNTER — OFFICE VISIT (OUTPATIENT)
Dept: UROLOGY | Age: 76
End: 2021-12-20
Payer: MEDICARE

## 2021-12-20 VITALS — TEMPERATURE: 97.5 F | WEIGHT: 140.8 LBS | HEIGHT: 63 IN | BODY MASS INDEX: 24.95 KG/M2

## 2021-12-20 DIAGNOSIS — R33.9 INCOMPLETE BLADDER EMPTYING: Primary | ICD-10-CM

## 2021-12-20 DIAGNOSIS — N39.0 RECURRENT UTI: ICD-10-CM

## 2021-12-20 DIAGNOSIS — Z87.442 HISTORY OF KIDNEY STONES: ICD-10-CM

## 2021-12-20 DIAGNOSIS — N95.2 ATROPHIC VAGINITIS: ICD-10-CM

## 2021-12-20 LAB
APPEARANCE FLUID: CLEAR
BILIRUBIN, POC: NORMAL
BLOOD URINE, POC: NORMAL
CLARITY, POC: CLEAR
COLOR, POC: NORMAL
GLUCOSE URINE, POC: NORMAL
KETONES, POC: NORMAL
LEUKOCYTE EST, POC: NORMAL
NITRITE, POC: NORMAL
PH, POC: 6.5
PROTEIN, POC: NORMAL
SPECIFIC GRAVITY, POC: 1.01
UROBILINOGEN, POC: 0.2

## 2021-12-20 PROCEDURE — 3017F COLORECTAL CA SCREEN DOC REV: CPT | Performed by: NURSE PRACTITIONER

## 2021-12-20 PROCEDURE — 99214 OFFICE O/P EST MOD 30 MIN: CPT | Performed by: NURSE PRACTITIONER

## 2021-12-20 PROCEDURE — 51798 US URINE CAPACITY MEASURE: CPT | Performed by: NURSE PRACTITIONER

## 2021-12-20 PROCEDURE — G8420 CALC BMI NORM PARAMETERS: HCPCS | Performed by: NURSE PRACTITIONER

## 2021-12-20 PROCEDURE — 81002 URINALYSIS NONAUTO W/O SCOPE: CPT | Performed by: NURSE PRACTITIONER

## 2021-12-20 PROCEDURE — G8427 DOCREV CUR MEDS BY ELIG CLIN: HCPCS | Performed by: NURSE PRACTITIONER

## 2021-12-20 PROCEDURE — 4040F PNEUMOC VAC/ADMIN/RCVD: CPT | Performed by: NURSE PRACTITIONER

## 2021-12-20 PROCEDURE — 1123F ACP DISCUSS/DSCN MKR DOCD: CPT | Performed by: NURSE PRACTITIONER

## 2021-12-20 PROCEDURE — G8399 PT W/DXA RESULTS DOCUMENT: HCPCS | Performed by: NURSE PRACTITIONER

## 2021-12-20 PROCEDURE — G8484 FLU IMMUNIZE NO ADMIN: HCPCS | Performed by: NURSE PRACTITIONER

## 2021-12-20 PROCEDURE — 1036F TOBACCO NON-USER: CPT | Performed by: NURSE PRACTITIONER

## 2021-12-20 PROCEDURE — 1090F PRES/ABSN URINE INCON ASSESS: CPT | Performed by: NURSE PRACTITIONER

## 2021-12-20 RX ORDER — SILODOSIN 4 MG/1
4 CAPSULE ORAL EVERY EVENING
Qty: 30 CAPSULE | Refills: 6 | Status: SHIPPED | OUTPATIENT
Start: 2021-12-20 | End: 2022-06-21 | Stop reason: SDUPTHER

## 2021-12-20 RX ORDER — CALCIUM CARB/VITAMIN D3/VIT K1 650MG-12.5
1 TABLET,CHEWABLE ORAL 2 TIMES DAILY
COMMUNITY

## 2021-12-20 NOTE — PROGRESS NOTES
Eddie Toth is a 78 y.o. female who presents today   Chief Complaint   Patient presents with    Follow-up     I am here today for a 3 mo fu for incomplete bladder emptying       Patient is 44-year-old female who presents the clinic today for follow-up. She is a previous patient of Dr. Manny Lackey and was transferred to female urology [place]. She does have a history of bilateral ESWL 2019. I did have her follow-up with last visit with a CT due to abnormal renal ultrasound indicating a 3 mm left nonobstructing renal stone and a right dilated ureter as well as a right renal cyst.  CT is negative for any abnormal dilated ureter. And no nonobstructing renal calculi are noted in either kidney. She also has a history of recurrent UTIs. She did stop her Premarin due to cost.  She would like to stay off this as long as she can. She did have some complaints of incomplete bladder emptying and has seemed to strain to void at times. She states she has had this within the last 3 years likely secondary to her bladder surgery approximately 3 years ago. At last visit I started her on Flomax although she is unable to tolerate this due to hypotension therefore she has been maintained on Rapaflo and denies any side effects. Last bladder scan at visit was 94 mL PVR.     Past Medical History:   Diagnosis Date    Allergic rhinitis     Hypertension     Hypothyroidism     Kidney stones     Osteoarthritis        Past Surgical History:   Procedure Laterality Date    CHOLECYSTECTOMY      COLONOSCOPY  4-    COLONOSCOPY  04/13/2017    Dr Gabe Nunes, 5 yr recall    HERNIA REPAIR      [place] COLONOSCOPY FLX DX W/COLLJ SPEC WHEN PFRMD N/A 4/13/2017    COLONOSCOPY DIAGNOSTIC OR SCREENING performed by David Elaine DO at [facility]    TONSILLECTOMY      TUBAL LIGATION         Current Outpatient Medications   Medication Sig Dispense Refill    Calcium-Vitamin D-Vitamin K (VIACTIV CALCIUM PLUS D) 457-88.7-25 MG-MCG-MCG CHEW Take by mouth      silodosin (RAPAFLO) 4 MG CAPS capsule Take 1 capsule by mouth every evening 30 capsule 6    WAL-ZYR D 5-120 MG per extended release tablet TAKE 1 TABLET BY MOUTH EVERY DAY      celecoxib (CELEBREX) 100 MG capsule TAKE 1 CAPSULE BY MOUTH TWICE DAILY WITH FOOD      Cranberry 500 MG TABS Take 1 tablet by mouth daily      fluticasone (FLONASE) 50 MCG/ACT nasal spray 2 sprays by Nasal route daily as needed      gabapentin (NEURONTIN) 100 MG capsule TAKE 1 TO 3 CAPSULES BY MOUTH EVERY NIGHT AT BEDTIME      magnesium (MAGNESIUM-OXIDE) 250 MG TABS tablet Take 500 mg by mouth daily      naproxen sodium (ANAPROX) 220 MG tablet Take 220 mg by mouth 2 times daily as needed      omeprazole (PRILOSEC OTC) 20 MG tablet       ondansetron (ZOFRAN ODT) 4 MG disintegrating tablet Take 1 tablet by mouth every 8 hours as needed for Nausea or Vomiting 15 tablet 0    Omega 3-6-9 Fatty Acids (OMEGA 3-6-9 PO) Take by mouth      LORazepam (ATIVAN) 1 MG tablet Take 1 mg by mouth every 6 hours as needed for Anxiety      losartan (COZAAR) 50 MG tablet Take 50 mg by mouth daily      temazepam (RESTORIL) 15 MG capsule Take 30 mg by mouth nightly as needed for Sleep.  Levothyroxine Sodium (LEVOXYL PO) Take 100 mcg by mouth       Multiple Vitamin (MULTIVITAMIN PO) Take  by mouth. No current facility-administered medications for this visit.        Allergies   Allergen Reactions    Sulfamethoxazole-Trimethoprim Diarrhea    Pcn [Penicillins]     Sulfa Antibiotics     Vioxx     Levofloxacin Other (See Comments)     Pt cant remember reaction       Social History     Socioeconomic History    Marital status:      Spouse name: None    Number of children: None    Years of education: None    Highest education level: None   Occupational History    None   Tobacco Use    Smoking status: Former Smoker    Smokeless tobacco: Never Used   Substance and Sexual Activity    Alcohol use: No    Drug use: No    Sexual activity: None   Other Topics Concern    None   Social History Narrative    None     Social Determinants of Health     Financial Resource Strain:     Difficulty of Paying Living Expenses: Not on file   Food Insecurity:     Worried About Running Out of Food in the Last Year: Not on file    Zabrina of Food in the Last Year: Not on file   Transportation Needs:     Lack of Transportation (Medical): Not on file    Lack of Transportation (Non-Medical): Not on file   Physical Activity:     Days of Exercise per Week: Not on file    Minutes of Exercise per Session: Not on file   Stress:     Feeling of Stress : Not on file   Social Connections:     Frequency of Communication with Friends and Family: Not on file    Frequency of Social Gatherings with Friends and Family: Not on file    Attends Restorationist Services: Not on file    Active Member of [address] [place] [address] [employer] or Organizations: Not on file    Attends Club or Organization Meetings: Not on file    Marital Status: Not on file   Intimate Partner Violence:     Fear of Current or Ex-Partner: Not on file    Emotionally Abused: Not on file    Physically Abused: Not on file    Sexually Abused: Not on file   Housing Stability:     Unable to Pay for Housing in the Last Year: Not on file    Number of [place] in the Last Year: Not on file    Unstable Housing in the Last Year: Not on file       Family History   Problem Relation Age of Onset    Colon Polyps Mother     Colon Cancer Neg Hx     Esophageal Cancer Neg Hx     Liver Cancer Neg Hx     Liver Disease Neg Hx     Rectal Cancer Neg Hx     Stomach Cancer Neg Hx        REVIEW OF SYSTEMS:  Review of Systems   Constitutional: Negative for chills and fever. Gastrointestinal: Negative for abdominal distention, abdominal pain, nausea and vomiting. Genitourinary: Negative for difficulty urinating, dysuria, flank pain, frequency, hematuria and urgency.    Musculoskeletal: Negative for back started with her bladder surgery approximately 3 years ago. She is emptying her bladder well therefore we will continue the Rapaflo for at least another 5 months and we will have her follow-up in 6 months. She would like to come off this medication if all possible. - MS Measure, post-void residual, US, non-imaging  - POCT Urinalysis no Micro  - silodosin (RAPAFLO) 4 MG CAPS capsule; Take 1 capsule by mouth every evening  Dispense: 30 capsule; Refill: 6    2. Recurrent UTI  3. Atrophic vaginitis  History of recurrent UTI and atrophic vaginitis. Was previously on Premarin although she does not want to stay on this if at all possible due to cost therefore we did discussed if she starts having recurrent UTIs again we will likely represcribe Premarin. 4. History of kidney stones  History of kidney stones last renal ultrasound was abnormal although I did obtain a CT prior this did not indicate any stone or nonobstructing stone in either kidney. Orders Placed This Encounter   Procedures    POCT Urinalysis no Micro    MS Measure, post-void residual, US, non-imaging        Return in about 6 months (around 6/20/2022). All information inputted into the note by the MA to include chief complaint, past medical history, past surgical history, medications, allergies, social and family history and review of systems has been reviewed and updated as needed by me. EMR Dragon/transcription disclaimer: Much of this documentt is electronic  transcription/translation of spoken language to printed text. The  electronic translation of spoken language may be erroneous, or at times,  nonsensical words or phrases may be inadvertently transcribed.  Although I  have reviewed the document for such errors, some may still exist.

## 2021-12-21 ASSESSMENT — ENCOUNTER SYMPTOMS
ABDOMINAL PAIN: 0
ABDOMINAL DISTENTION: 0
VOMITING: 0
BACK PAIN: 0
NAUSEA: 0

## 2022-01-21 ENCOUNTER — HOSPITAL ENCOUNTER (OUTPATIENT)
Dept: PREADMISSION TESTING | Age: 77
Discharge: HOME | End: 2022-01-25
Payer: MEDICARE

## 2022-01-21 ENCOUNTER — HOSPITAL ENCOUNTER (OUTPATIENT)
Dept: GENERAL RADIOLOGY | Age: 77
Discharge: HOME | End: 2022-01-21
Payer: MEDICARE

## 2022-01-21 VITALS — BODY MASS INDEX: 24.63 KG/M2 | WEIGHT: 139 LBS | HEIGHT: 63 IN

## 2022-01-21 LAB
ABO/RH: NORMAL
ALBUMIN SERPL-MCNC: 4.1 G/DL (ref 3.5–5.2)
ALP BLD-CCNC: 105 U/L (ref 35–104)
ALT SERPL-CCNC: 25 U/L (ref 5–33)
ANION GAP SERPL CALCULATED.3IONS-SCNC: 11 MMOL/L (ref 7–19)
ANTIBODY SCREEN: NORMAL
APTT: 25.3 SEC (ref 26–36.2)
AST SERPL-CCNC: 30 U/L (ref 5–32)
BASOPHILS ABSOLUTE: 0.1 K/UL (ref 0–0.2)
BASOPHILS RELATIVE PERCENT: 1.4 % (ref 0–1)
BILIRUB SERPL-MCNC: 0.5 MG/DL (ref 0.2–1.2)
BILIRUBIN URINE: NEGATIVE
BLOOD, URINE: NEGATIVE
BUN BLDV-MCNC: 22 MG/DL (ref 8–23)
CALCIUM SERPL-MCNC: 9.4 MG/DL (ref 8.8–10.2)
CHLORIDE BLD-SCNC: 106 MMOL/L (ref 98–111)
CLARITY: CLEAR
CO2: 23 MMOL/L (ref 22–29)
COLOR: YELLOW
CREAT SERPL-MCNC: 0.8 MG/DL (ref 0.5–0.9)
EKG P AXIS: 66 DEGREES
EKG P-R INTERVAL: 140 MS
EKG Q-T INTERVAL: 394 MS
EKG QRS DURATION: 94 MS
EKG QTC CALCULATION (BAZETT): 404 MS
EKG T AXIS: 47 DEGREES
EOSINOPHILS ABSOLUTE: 0.6 K/UL (ref 0–0.6)
EOSINOPHILS RELATIVE PERCENT: 7.5 % (ref 0–5)
GFR AFRICAN AMERICAN: >59
GFR NON-AFRICAN AMERICAN: >60
GLUCOSE BLD-MCNC: 76 MG/DL (ref 74–109)
GLUCOSE URINE: NEGATIVE MG/DL
HCT VFR BLD CALC: 39.7 % (ref 37–47)
HEMOGLOBIN: 12.1 G/DL (ref 12–16)
IMMATURE GRANULOCYTES #: 0 K/UL
INR BLD: 0.96 (ref 0.88–1.18)
KETONES, URINE: NEGATIVE MG/DL
LEUKOCYTE ESTERASE, URINE: NEGATIVE
LYMPHOCYTES ABSOLUTE: 2.6 K/UL (ref 1.1–4.5)
LYMPHOCYTES RELATIVE PERCENT: 34.6 % (ref 20–40)
MCH RBC QN AUTO: 29.2 PG (ref 27–31)
MCHC RBC AUTO-ENTMCNC: 30.5 G/DL (ref 33–37)
MCV RBC AUTO: 95.9 FL (ref 81–99)
MONOCYTES ABSOLUTE: 0.8 K/UL (ref 0–0.9)
MONOCYTES RELATIVE PERCENT: 10.5 % (ref 0–10)
MRSA SCREEN RT-PCR: NOT DETECTED
NEUTROPHILS ABSOLUTE: 3.4 K/UL (ref 1.5–7.5)
NEUTROPHILS RELATIVE PERCENT: 45.7 % (ref 50–65)
NITRITE, URINE: NEGATIVE
PDW BLD-RTO: 14.1 % (ref 11.5–14.5)
PH UA: 7 (ref 5–8)
PLATELET # BLD: 236 K/UL (ref 130–400)
PMV BLD AUTO: 10.3 FL (ref 9.4–12.3)
POTASSIUM SERPL-SCNC: 4.3 MMOL/L (ref 3.5–5)
PROTEIN UA: NEGATIVE MG/DL
PROTHROMBIN TIME: 13 SEC (ref 12–14.6)
RBC # BLD: 4.14 M/UL (ref 4.2–5.4)
SODIUM BLD-SCNC: 140 MMOL/L (ref 136–145)
SPECIFIC GRAVITY UA: 1.01 (ref 1–1.03)
TOTAL PROTEIN: 7.2 G/DL (ref 6.6–8.7)
UROBILINOGEN, URINE: 0.2 E.U./DL
WBC # BLD: 7.4 K/UL (ref 4.8–10.8)

## 2022-01-21 PROCEDURE — 80053 COMPREHEN METABOLIC PANEL: CPT

## 2022-01-21 PROCEDURE — 85610 PROTHROMBIN TIME: CPT

## 2022-01-21 PROCEDURE — 87641 MR-STAPH DNA AMP PROBE: CPT

## 2022-01-21 PROCEDURE — 71046 X-RAY EXAM CHEST 2 VIEWS: CPT

## 2022-01-21 PROCEDURE — 86901 BLOOD TYPING SEROLOGIC RH(D): CPT

## 2022-01-21 PROCEDURE — 81003 URINALYSIS AUTO W/O SCOPE: CPT

## 2022-01-21 PROCEDURE — 86850 RBC ANTIBODY SCREEN: CPT

## 2022-01-21 PROCEDURE — 85025 COMPLETE CBC W/AUTO DIFF WBC: CPT

## 2022-01-21 PROCEDURE — 85730 THROMBOPLASTIN TIME PARTIAL: CPT

## 2022-01-21 PROCEDURE — 93005 ELECTROCARDIOGRAM TRACING: CPT | Performed by: ORTHOPAEDIC SURGERY

## 2022-01-21 PROCEDURE — 86900 BLOOD TYPING SEROLOGIC ABO: CPT

## 2022-01-21 PROCEDURE — 93010 ELECTROCARDIOGRAM REPORT: CPT | Performed by: INTERNAL MEDICINE

## 2022-01-21 RX ORDER — OMEGA-3 FATTY ACIDS/FISH OIL 300-1000MG
1 CAPSULE ORAL DAILY
Status: ON HOLD | COMMUNITY
End: 2022-02-02 | Stop reason: HOSPADM

## 2022-01-21 RX ORDER — OXYCODONE HCL 10 MG/1
10 TABLET, FILM COATED, EXTENDED RELEASE ORAL ONCE
Status: CANCELLED | OUTPATIENT
Start: 2022-02-02

## 2022-01-21 RX ORDER — PREGABALIN 75 MG/1
75 CAPSULE ORAL ONCE
Status: CANCELLED | OUTPATIENT
Start: 2022-02-02

## 2022-01-21 RX ORDER — LEVOTHYROXINE SODIUM 0.1 MG/1
100 TABLET ORAL DAILY
COMMUNITY

## 2022-01-21 RX ORDER — CELECOXIB 200 MG/1
200 CAPSULE ORAL ONCE
Status: CANCELLED | OUTPATIENT
Start: 2022-02-02

## 2022-01-21 RX ORDER — ACETAMINOPHEN 500 MG
1000 TABLET ORAL ONCE
Status: CANCELLED | OUTPATIENT
Start: 2022-02-02

## 2022-01-21 RX ORDER — DEXAMETHASONE SODIUM PHOSPHATE 10 MG/ML
10 INJECTION, SOLUTION INTRAMUSCULAR; INTRAVENOUS ONCE
Status: CANCELLED | OUTPATIENT
Start: 2022-02-02

## 2022-01-31 ENCOUNTER — HOSPITAL ENCOUNTER (OUTPATIENT)
Dept: PREADMISSION TESTING | Age: 77
Discharge: HOME | End: 2022-02-04
Payer: MEDICARE

## 2022-01-31 LAB — SARS-COV-2, PCR: NOT DETECTED

## 2022-01-31 PROCEDURE — U0003 INFECTIOUS AGENT DETECTION BY NUCLEIC ACID (DNA OR RNA); SEVERE ACUTE RESPIRATORY SYNDROME CORONAVIRUS 2 (SARS-COV-2) (CORONAVIRUS DISEASE [COVID-19]), AMPLIFIED PROBE TECHNIQUE, MAKING USE OF HIGH THROUGHPUT TECHNOLOGIES AS DESCRIBED BY CMS-2020-01-R: HCPCS

## 2022-01-31 PROCEDURE — U0005 INFEC AGEN DETEC AMPLI PROBE: HCPCS

## 2022-02-02 ENCOUNTER — APPOINTMENT (OUTPATIENT)
Dept: GENERAL RADIOLOGY | Age: 77
End: 2022-02-02
Attending: ORTHOPAEDIC SURGERY
Payer: MEDICARE

## 2022-02-02 ENCOUNTER — ANESTHESIA (OUTPATIENT)
Dept: OPERATING ROOM | Age: 77
End: 2022-02-02
Payer: MEDICARE

## 2022-02-02 ENCOUNTER — HOSPITAL ENCOUNTER (OUTPATIENT)
Age: 77
Setting detail: OUTPATIENT SURGERY
Discharge: HOME | End: 2022-02-02
Attending: ORTHOPAEDIC SURGERY | Admitting: ORTHOPAEDIC SURGERY
Payer: MEDICARE

## 2022-02-02 ENCOUNTER — ANESTHESIA EVENT (OUTPATIENT)
Dept: OPERATING ROOM | Age: 77
End: 2022-02-02
Payer: MEDICARE

## 2022-02-02 VITALS — DIASTOLIC BLOOD PRESSURE: 77 MMHG | SYSTOLIC BLOOD PRESSURE: 137 MMHG | TEMPERATURE: 96.9 F | OXYGEN SATURATION: 97 %

## 2022-02-02 VITALS
WEIGHT: 140 LBS | HEIGHT: 62 IN | DIASTOLIC BLOOD PRESSURE: 88 MMHG | TEMPERATURE: 96.9 F | SYSTOLIC BLOOD PRESSURE: 177 MMHG | RESPIRATION RATE: 18 BRPM | HEART RATE: 78 BPM | BODY MASS INDEX: 25.76 KG/M2 | OXYGEN SATURATION: 100 %

## 2022-02-02 DIAGNOSIS — M17.11 PRIMARY OSTEOARTHRITIS OF RIGHT KNEE: Primary | ICD-10-CM

## 2022-02-02 PROCEDURE — 3600000015 HC SURGERY LEVEL 5 ADDTL 15MIN: Performed by: ORTHOPAEDIC SURGERY

## 2022-02-02 PROCEDURE — A4217 STERILE WATER/SALINE, 500 ML: HCPCS | Performed by: ORTHOPAEDIC SURGERY

## 2022-02-02 PROCEDURE — 3700000000 HC ANESTHESIA ATTENDED CARE: Performed by: ORTHOPAEDIC SURGERY

## 2022-02-02 PROCEDURE — 6360000002 HC RX W HCPCS: Performed by: ORTHOPAEDIC SURGERY

## 2022-02-02 PROCEDURE — C1776 JOINT DEVICE (IMPLANTABLE): HCPCS | Performed by: ORTHOPAEDIC SURGERY

## 2022-02-02 PROCEDURE — 73560 X-RAY EXAM OF KNEE 1 OR 2: CPT

## 2022-02-02 PROCEDURE — 2580000003 HC RX 258: Performed by: ORTHOPAEDIC SURGERY

## 2022-02-02 PROCEDURE — 3700000001 HC ADD 15 MINUTES (ANESTHESIA): Performed by: ORTHOPAEDIC SURGERY

## 2022-02-02 PROCEDURE — 6370000000 HC RX 637 (ALT 250 FOR IP): Performed by: ORTHOPAEDIC SURGERY

## 2022-02-02 PROCEDURE — 7100000010 HC PHASE II RECOVERY - FIRST 15 MIN: Performed by: ORTHOPAEDIC SURGERY

## 2022-02-02 PROCEDURE — 7100000001 HC PACU RECOVERY - ADDTL 15 MIN: Performed by: ORTHOPAEDIC SURGERY

## 2022-02-02 PROCEDURE — 2580000003 HC RX 258: Performed by: ANESTHESIOLOGY

## 2022-02-02 PROCEDURE — 97116 GAIT TRAINING THERAPY: CPT

## 2022-02-02 PROCEDURE — 7100000000 HC PACU RECOVERY - FIRST 15 MIN: Performed by: ORTHOPAEDIC SURGERY

## 2022-02-02 PROCEDURE — 64447 NJX AA&/STRD FEMORAL NRV IMG: CPT

## 2022-02-02 PROCEDURE — 97161 PT EVAL LOW COMPLEX 20 MIN: CPT

## 2022-02-02 PROCEDURE — 2709999900 HC NON-CHARGEABLE SUPPLY: Performed by: ORTHOPAEDIC SURGERY

## 2022-02-02 PROCEDURE — C9290 INJ, BUPIVACAINE LIPOSOME: HCPCS | Performed by: ORTHOPAEDIC SURGERY

## 2022-02-02 PROCEDURE — 6360000002 HC RX W HCPCS

## 2022-02-02 PROCEDURE — 2500000003 HC RX 250 WO HCPCS: Performed by: ORTHOPAEDIC SURGERY

## 2022-02-02 PROCEDURE — 3600000005 HC SURGERY LEVEL 5 BASE: Performed by: ORTHOPAEDIC SURGERY

## 2022-02-02 PROCEDURE — 2720000010 HC SURG SUPPLY STERILE: Performed by: ORTHOPAEDIC SURGERY

## 2022-02-02 PROCEDURE — 7100000011 HC PHASE II RECOVERY - ADDTL 15 MIN: Performed by: ORTHOPAEDIC SURGERY

## 2022-02-02 PROCEDURE — 2500000003 HC RX 250 WO HCPCS

## 2022-02-02 DEVICE — PSN TIB POR 2 PEG SZ E R: Type: IMPLANTABLE DEVICE | Site: KNEE | Status: FUNCTIONAL

## 2022-02-02 DEVICE — PSN MC VE ASF R 10MM 6-7/EF: Type: IMPLANTABLE DEVICE | Site: KNEE | Status: FUNCTIONAL

## 2022-02-02 DEVICE — PSN FEM CR POR CCR NRW SZ6 R: Type: IMPLANTABLE DEVICE | Site: KNEE | Status: FUNCTIONAL

## 2022-02-02 RX ORDER — METOCLOPRAMIDE HYDROCHLORIDE 5 MG/ML
10 INJECTION INTRAMUSCULAR; INTRAVENOUS
Status: DISCONTINUED | OUTPATIENT
Start: 2022-02-02 | End: 2022-02-02 | Stop reason: HOSPADM

## 2022-02-02 RX ORDER — METOPROLOL TARTRATE 5 MG/5ML
INJECTION INTRAVENOUS PRN
Status: DISCONTINUED | OUTPATIENT
Start: 2022-02-02 | End: 2022-02-02 | Stop reason: SDUPTHER

## 2022-02-02 RX ORDER — ENALAPRILAT 2.5 MG/2ML
1.25 INJECTION INTRAVENOUS
Status: DISCONTINUED | OUTPATIENT
Start: 2022-02-02 | End: 2022-02-02 | Stop reason: HOSPADM

## 2022-02-02 RX ORDER — FENTANYL CITRATE 50 UG/ML
50 INJECTION, SOLUTION INTRAMUSCULAR; INTRAVENOUS
Status: DISCONTINUED | OUTPATIENT
Start: 2022-02-02 | End: 2022-02-02 | Stop reason: HOSPADM

## 2022-02-02 RX ORDER — SODIUM CHLORIDE 0.9 % (FLUSH) 0.9 %
5-40 SYRINGE (ML) INJECTION EVERY 12 HOURS SCHEDULED
Status: DISCONTINUED | OUTPATIENT
Start: 2022-02-02 | End: 2022-02-02 | Stop reason: HOSPADM

## 2022-02-02 RX ORDER — CELECOXIB 200 MG/1
200 CAPSULE ORAL ONCE
Status: DISCONTINUED | OUTPATIENT
Start: 2022-02-02 | End: 2022-02-02

## 2022-02-02 RX ORDER — ONDANSETRON 2 MG/ML
INJECTION INTRAMUSCULAR; INTRAVENOUS PRN
Status: DISCONTINUED | OUTPATIENT
Start: 2022-02-02 | End: 2022-02-02 | Stop reason: SDUPTHER

## 2022-02-02 RX ORDER — SODIUM CHLORIDE, SODIUM LACTATE, POTASSIUM CHLORIDE, CALCIUM CHLORIDE 600; 310; 30; 20 MG/100ML; MG/100ML; MG/100ML; MG/100ML
INJECTION, SOLUTION INTRAVENOUS CONTINUOUS
Status: DISCONTINUED | OUTPATIENT
Start: 2022-02-02 | End: 2022-02-02 | Stop reason: HOSPADM

## 2022-02-02 RX ORDER — LABETALOL HYDROCHLORIDE 5 MG/ML
5 INJECTION, SOLUTION INTRAVENOUS EVERY 10 MIN PRN
Status: DISCONTINUED | OUTPATIENT
Start: 2022-02-02 | End: 2022-02-02 | Stop reason: HOSPADM

## 2022-02-02 RX ORDER — DIPHENHYDRAMINE HYDROCHLORIDE 50 MG/ML
12.5 INJECTION INTRAMUSCULAR; INTRAVENOUS
Status: DISCONTINUED | OUTPATIENT
Start: 2022-02-02 | End: 2022-02-02 | Stop reason: HOSPADM

## 2022-02-02 RX ORDER — HYDROMORPHONE HYDROCHLORIDE 1 MG/ML
0.5 INJECTION, SOLUTION INTRAMUSCULAR; INTRAVENOUS; SUBCUTANEOUS EVERY 5 MIN PRN
Status: DISCONTINUED | OUTPATIENT
Start: 2022-02-02 | End: 2022-02-02 | Stop reason: HOSPADM

## 2022-02-02 RX ORDER — CEFAZOLIN SODIUM 2 G/100ML
2000 INJECTION, SOLUTION INTRAVENOUS ONCE
Status: COMPLETED | OUTPATIENT
Start: 2022-02-02 | End: 2022-02-02

## 2022-02-02 RX ORDER — MIDAZOLAM HYDROCHLORIDE 1 MG/ML
INJECTION INTRAMUSCULAR; INTRAVENOUS
Status: COMPLETED
Start: 2022-02-02 | End: 2022-02-02

## 2022-02-02 RX ORDER — MIDAZOLAM HYDROCHLORIDE 1 MG/ML
2 INJECTION INTRAMUSCULAR; INTRAVENOUS
Status: COMPLETED | OUTPATIENT
Start: 2022-02-02 | End: 2022-02-02

## 2022-02-02 RX ORDER — MEPERIDINE HYDROCHLORIDE 25 MG/ML
12.5 INJECTION INTRAMUSCULAR; INTRAVENOUS; SUBCUTANEOUS EVERY 5 MIN PRN
Status: DISCONTINUED | OUTPATIENT
Start: 2022-02-02 | End: 2022-02-02 | Stop reason: HOSPADM

## 2022-02-02 RX ORDER — LIDOCAINE HYDROCHLORIDE 10 MG/ML
INJECTION, SOLUTION EPIDURAL; INFILTRATION; INTRACAUDAL; PERINEURAL PRN
Status: DISCONTINUED | OUTPATIENT
Start: 2022-02-02 | End: 2022-02-02 | Stop reason: SDUPTHER

## 2022-02-02 RX ORDER — OXYCODONE HCL 10 MG/1
10 TABLET, FILM COATED, EXTENDED RELEASE ORAL ONCE
Status: COMPLETED | OUTPATIENT
Start: 2022-02-02 | End: 2022-02-02

## 2022-02-02 RX ORDER — OXYCODONE HYDROCHLORIDE 5 MG/1
5 TABLET ORAL SEE ADMIN INSTRUCTIONS
Qty: 90 TABLET | Refills: 0 | Status: SHIPPED | OUTPATIENT
Start: 2022-02-02 | End: 2022-03-02

## 2022-02-02 RX ORDER — TRANEXAMIC ACID 100 MG/ML
INJECTION, SOLUTION INTRAVENOUS PRN
Status: DISCONTINUED | OUTPATIENT
Start: 2022-02-02 | End: 2022-02-02 | Stop reason: SDUPTHER

## 2022-02-02 RX ORDER — FENTANYL CITRATE 50 UG/ML
INJECTION, SOLUTION INTRAMUSCULAR; INTRAVENOUS PRN
Status: DISCONTINUED | OUTPATIENT
Start: 2022-02-02 | End: 2022-02-02 | Stop reason: SDUPTHER

## 2022-02-02 RX ORDER — ONDANSETRON 4 MG/1
4 TABLET, FILM COATED ORAL EVERY 8 HOURS PRN
Qty: 30 TABLET | Refills: 0 | Status: SHIPPED | OUTPATIENT
Start: 2022-02-02 | End: 2022-04-06 | Stop reason: ALTCHOICE

## 2022-02-02 RX ORDER — LIDOCAINE HYDROCHLORIDE 10 MG/ML
INJECTION, SOLUTION INFILTRATION; PERINEURAL
Status: COMPLETED | OUTPATIENT
Start: 2022-02-02 | End: 2022-02-02

## 2022-02-02 RX ORDER — OXYCODONE HYDROCHLORIDE 5 MG/1
5 TABLET ORAL
Status: DISCONTINUED | OUTPATIENT
Start: 2022-02-02 | End: 2022-02-02 | Stop reason: HOSPADM

## 2022-02-02 RX ORDER — DEXAMETHASONE SODIUM PHOSPHATE 10 MG/ML
INJECTION, SOLUTION INTRAMUSCULAR; INTRAVENOUS PRN
Status: DISCONTINUED | OUTPATIENT
Start: 2022-02-02 | End: 2022-02-02 | Stop reason: SDUPTHER

## 2022-02-02 RX ORDER — SODIUM CHLORIDE 9 MG/ML
25 INJECTION, SOLUTION INTRAVENOUS PRN
Status: DISCONTINUED | OUTPATIENT
Start: 2022-02-02 | End: 2022-02-02 | Stop reason: HOSPADM

## 2022-02-02 RX ORDER — HYDROMORPHONE HYDROCHLORIDE 1 MG/ML
0.25 INJECTION, SOLUTION INTRAMUSCULAR; INTRAVENOUS; SUBCUTANEOUS EVERY 5 MIN PRN
Status: DISCONTINUED | OUTPATIENT
Start: 2022-02-02 | End: 2022-02-02 | Stop reason: HOSPADM

## 2022-02-02 RX ORDER — MORPHINE SULFATE 4 MG/ML
4 INJECTION, SOLUTION INTRAMUSCULAR; INTRAVENOUS
Status: DISCONTINUED | OUTPATIENT
Start: 2022-02-02 | End: 2022-02-02 | Stop reason: HOSPADM

## 2022-02-02 RX ORDER — ROPIVACAINE HYDROCHLORIDE 5 MG/ML
INJECTION, SOLUTION EPIDURAL; INFILTRATION; PERINEURAL
Status: COMPLETED
Start: 2022-02-02 | End: 2022-02-02

## 2022-02-02 RX ORDER — RIVAROXABAN 10 MG/1
TABLET, FILM COATED ORAL
Qty: 21 TABLET | Refills: 0 | Status: SHIPPED | OUTPATIENT
Start: 2022-02-02 | End: 2022-02-02 | Stop reason: HOSPADM

## 2022-02-02 RX ORDER — SODIUM CHLORIDE 0.9 % (FLUSH) 0.9 %
5-40 SYRINGE (ML) INJECTION PRN
Status: DISCONTINUED | OUTPATIENT
Start: 2022-02-02 | End: 2022-02-02 | Stop reason: HOSPADM

## 2022-02-02 RX ORDER — SUCCINYLCHOLINE CHLORIDE 20 MG/ML
INJECTION INTRAMUSCULAR; INTRAVENOUS PRN
Status: DISCONTINUED | OUTPATIENT
Start: 2022-02-02 | End: 2022-02-02 | Stop reason: SDUPTHER

## 2022-02-02 RX ORDER — MORPHINE SULFATE 4 MG/ML
4 INJECTION, SOLUTION INTRAMUSCULAR; INTRAVENOUS EVERY 5 MIN PRN
Status: DISCONTINUED | OUTPATIENT
Start: 2022-02-02 | End: 2022-02-02 | Stop reason: HOSPADM

## 2022-02-02 RX ORDER — PROMETHAZINE HYDROCHLORIDE 25 MG/ML
6.25 INJECTION, SOLUTION INTRAMUSCULAR; INTRAVENOUS
Status: DISCONTINUED | OUTPATIENT
Start: 2022-02-02 | End: 2022-02-02 | Stop reason: HOSPADM

## 2022-02-02 RX ORDER — MORPHINE SULFATE 2 MG/ML
2 INJECTION, SOLUTION INTRAMUSCULAR; INTRAVENOUS EVERY 5 MIN PRN
Status: DISCONTINUED | OUTPATIENT
Start: 2022-02-02 | End: 2022-02-02 | Stop reason: HOSPADM

## 2022-02-02 RX ORDER — SCOLOPAMINE TRANSDERMAL SYSTEM 1 MG/1
1 PATCH, EXTENDED RELEASE TRANSDERMAL ONCE
Status: DISCONTINUED | OUTPATIENT
Start: 2022-02-02 | End: 2022-02-02 | Stop reason: HOSPADM

## 2022-02-02 RX ORDER — HYDRALAZINE HYDROCHLORIDE 20 MG/ML
5 INJECTION INTRAMUSCULAR; INTRAVENOUS EVERY 10 MIN PRN
Status: DISCONTINUED | OUTPATIENT
Start: 2022-02-02 | End: 2022-02-02 | Stop reason: HOSPADM

## 2022-02-02 RX ORDER — ROCURONIUM BROMIDE 10 MG/ML
INJECTION, SOLUTION INTRAVENOUS PRN
Status: DISCONTINUED | OUTPATIENT
Start: 2022-02-02 | End: 2022-02-02 | Stop reason: SDUPTHER

## 2022-02-02 RX ORDER — ROPIVACAINE HYDROCHLORIDE 5 MG/ML
INJECTION, SOLUTION EPIDURAL; INFILTRATION; PERINEURAL
Status: COMPLETED | OUTPATIENT
Start: 2022-02-02 | End: 2022-02-02

## 2022-02-02 RX ORDER — LIDOCAINE HYDROCHLORIDE 10 MG/ML
1 INJECTION, SOLUTION EPIDURAL; INFILTRATION; INTRACAUDAL; PERINEURAL
Status: DISCONTINUED | OUTPATIENT
Start: 2022-02-02 | End: 2022-02-02 | Stop reason: HOSPADM

## 2022-02-02 RX ORDER — PREGABALIN 75 MG/1
75 CAPSULE ORAL ONCE
Status: COMPLETED | OUTPATIENT
Start: 2022-02-02 | End: 2022-02-02

## 2022-02-02 RX ORDER — CEFAZOLIN SODIUM 2 G/100ML
2000 INJECTION, SOLUTION INTRAVENOUS EVERY 8 HOURS
Status: DISCONTINUED | OUTPATIENT
Start: 2022-02-02 | End: 2022-02-02 | Stop reason: HOSPADM

## 2022-02-02 RX ORDER — DOXYCYCLINE HYCLATE 100 MG
100 TABLET ORAL 2 TIMES DAILY
Qty: 10 TABLET | Refills: 0 | Status: SHIPPED | OUTPATIENT
Start: 2022-02-02 | End: 2022-02-07

## 2022-02-02 RX ORDER — ACETAMINOPHEN 500 MG
1000 TABLET ORAL ONCE
Status: COMPLETED | OUTPATIENT
Start: 2022-02-02 | End: 2022-02-02

## 2022-02-02 RX ORDER — PROPOFOL 10 MG/ML
INJECTION, EMULSION INTRAVENOUS PRN
Status: DISCONTINUED | OUTPATIENT
Start: 2022-02-02 | End: 2022-02-02 | Stop reason: SDUPTHER

## 2022-02-02 RX ORDER — DEXAMETHASONE SODIUM PHOSPHATE 10 MG/ML
10 INJECTION, SOLUTION INTRAMUSCULAR; INTRAVENOUS ONCE
Status: DISCONTINUED | OUTPATIENT
Start: 2022-02-02 | End: 2022-02-02 | Stop reason: HOSPADM

## 2022-02-02 RX ADMIN — DEXAMETHASONE SODIUM PHOSPHATE 10 MG: 10 INJECTION, SOLUTION INTRAMUSCULAR; INTRAVENOUS at 08:43

## 2022-02-02 RX ADMIN — HYDROMORPHONE HYDROCHLORIDE 0.5 MG: 1 INJECTION, SOLUTION INTRAMUSCULAR; INTRAVENOUS; SUBCUTANEOUS at 11:01

## 2022-02-02 RX ADMIN — CEFAZOLIN SODIUM 2000 MG: 2 INJECTION, SOLUTION INTRAVENOUS at 13:02

## 2022-02-02 RX ADMIN — TRANEXAMIC ACID 1000 MG: 1 INJECTION, SOLUTION INTRAVENOUS at 08:50

## 2022-02-02 RX ADMIN — LIDOCAINE HYDROCHLORIDE 80 MG: 10 INJECTION, SOLUTION EPIDURAL; INFILTRATION; INTRACAUDAL; PERINEURAL at 08:36

## 2022-02-02 RX ADMIN — PROPOFOL 160 MG: 10 INJECTION, EMULSION INTRAVENOUS at 08:36

## 2022-02-02 RX ADMIN — ROCURONIUM BROMIDE 50 MG: 10 INJECTION, SOLUTION INTRAVENOUS at 08:55

## 2022-02-02 RX ADMIN — ONDANSETRON 4 MG: 2 INJECTION INTRAMUSCULAR; INTRAVENOUS at 10:02

## 2022-02-02 RX ADMIN — ROPIVACAINE HYDROCHLORIDE 20 ML: 5 INJECTION, SOLUTION EPIDURAL; INFILTRATION; PERINEURAL at 08:34

## 2022-02-02 RX ADMIN — METOPROLOL TARTRATE 2 MG: 5 INJECTION, SOLUTION INTRAVENOUS at 09:30

## 2022-02-02 RX ADMIN — ACETAMINOPHEN 1000 MG: 500 TABLET ORAL at 07:13

## 2022-02-02 RX ADMIN — FENTANYL CITRATE 50 MCG: 50 INJECTION, SOLUTION INTRAMUSCULAR; INTRAVENOUS at 09:23

## 2022-02-02 RX ADMIN — SODIUM CHLORIDE, SODIUM LACTATE, POTASSIUM CHLORIDE, AND CALCIUM CHLORIDE: 600; 310; 30; 20 INJECTION, SOLUTION INTRAVENOUS at 07:10

## 2022-02-02 RX ADMIN — OXYCODONE HYDROCHLORIDE 10 MG: 10 TABLET, FILM COATED, EXTENDED RELEASE ORAL at 07:13

## 2022-02-02 RX ADMIN — HYDROMORPHONE HYDROCHLORIDE 0.5 MG: 1 INJECTION, SOLUTION INTRAMUSCULAR; INTRAVENOUS; SUBCUTANEOUS at 10:43

## 2022-02-02 RX ADMIN — CEFAZOLIN SODIUM 2000 MG: 2 INJECTION, SOLUTION INTRAVENOUS at 08:50

## 2022-02-02 RX ADMIN — FENTANYL CITRATE 50 MCG: 50 INJECTION, SOLUTION INTRAMUSCULAR; INTRAVENOUS at 08:24

## 2022-02-02 RX ADMIN — LIDOCAINE HYDROCHLORIDE 1 ML: 10 INJECTION, SOLUTION INFILTRATION; PERINEURAL at 08:34

## 2022-02-02 RX ADMIN — FENTANYL CITRATE 100 MCG: 50 INJECTION, SOLUTION INTRAMUSCULAR; INTRAVENOUS at 10:27

## 2022-02-02 RX ADMIN — TRANEXAMIC ACID 1000 MG: 1 INJECTION, SOLUTION INTRAVENOUS at 10:14

## 2022-02-02 RX ADMIN — SUCCINYLCHOLINE CHLORIDE 100 MG: 20 INJECTION, SOLUTION INTRAMUSCULAR; INTRAVENOUS at 08:36

## 2022-02-02 RX ADMIN — FENTANYL CITRATE 50 MCG: 50 INJECTION, SOLUTION INTRAMUSCULAR; INTRAVENOUS at 08:32

## 2022-02-02 RX ADMIN — SUGAMMADEX 200 MG: 100 INJECTION, SOLUTION INTRAVENOUS at 10:05

## 2022-02-02 RX ADMIN — SODIUM CHLORIDE, SODIUM LACTATE, POTASSIUM CHLORIDE, AND CALCIUM CHLORIDE: 600; 310; 30; 20 INJECTION, SOLUTION INTRAVENOUS at 08:53

## 2022-02-02 RX ADMIN — PREGABALIN 75 MG: 75 CAPSULE ORAL at 07:13

## 2022-02-02 RX ADMIN — MIDAZOLAM 2 MG: 1 INJECTION INTRAMUSCULAR; INTRAVENOUS at 07:55

## 2022-02-02 RX ADMIN — MIDAZOLAM HYDROCHLORIDE 2 MG: 1 INJECTION INTRAMUSCULAR; INTRAVENOUS at 07:55

## 2022-02-02 ASSESSMENT — PAIN DESCRIPTION - ONSET: ONSET: ON-GOING

## 2022-02-02 ASSESSMENT — PAIN SCALES - GENERAL
PAINLEVEL_OUTOF10: 7
PAINLEVEL_OUTOF10: 6
PAINLEVEL_OUTOF10: 9
PAINLEVEL_OUTOF10: 9

## 2022-02-02 ASSESSMENT — PAIN DESCRIPTION - LOCATION
LOCATION: KNEE

## 2022-02-02 ASSESSMENT — PAIN DESCRIPTION - PAIN TYPE
TYPE: SURGICAL PAIN

## 2022-02-02 ASSESSMENT — PAIN DESCRIPTION - DESCRIPTORS
DESCRIPTORS: ACHING

## 2022-02-02 ASSESSMENT — PAIN DESCRIPTION - ORIENTATION
ORIENTATION: RIGHT

## 2022-02-02 ASSESSMENT — LIFESTYLE VARIABLES: SMOKING_STATUS: 0

## 2022-02-02 ASSESSMENT — PAIN DESCRIPTION - FREQUENCY
FREQUENCY: CONTINUOUS

## 2022-02-02 NOTE — PROGRESS NOTES
Physical Therapy     Facility/Department: Health system OR  Initial Assessment  PHYSICAL THERAPY EVALUATION      Tico Love    : 1945  MRN: [id number]   PHYSICIAN:  Mireya Guardado MD  Primary Problem    Patient Active Problem List   Diagnosis    Adenomatous polyp of colon    History of colonic polyps    Family history of polyps in the colon       Rehabilitation Diagnosis:  R TKR   M17.0       SERVICE DATE: 2022        SUBJECTIVE: Patient states that they are planning on discharging home today    Pain Screening  Patient Currently in Pain: No    PRIOR LEVEL OF FUNCTION:    [x] Independent in community no assistive device     [] Independent in community with assistive device     [] Independent in the house with assistive device     [] Transfer only    []     OBJECTIVE:  Orientation: Within functional Limits, GROGGY OVERALL      ROM - Passive, Non-operative side     [x] Left lower extremity  [] Right lower extremity       Within functional limits    ROM - Passive, operative side    [] Left lower extremity  [x] Right lower extremity      Hip - extension to 0 degrees and flexion to 80 in sitting    Knee - extension to 0 degrees in supine and flexion to 80 in sitting        STRENGTH - Non-operative side    [x] Left lower extremity  [] Right lower extremity       Within functional limits    STRENGTH - operative side    [] Left lower extremity  [x] Right lower extremity    Grossly  3-/5        TRANSFERS   Sit to stand     [] CGA [x] Minimum        Bed to chair     [] CGA [x] Minimum    Bed mobility   Supine to sit      [] CGA [x] Minimum      Scoot  [] Side to side  [] Up and down     [] CGA [] Minimum    AMBULATION   Weight bearing: - WBAT      Distance: 20'     Device: Rolling Walker - The patient has been trained on the use of this equipment     Assistance:       [] CGA [] Modified Independent [] Stand by / Supervision [x] Minimum         BALANCE   Sitting    Good    Standing    Good (-)    Tx Initiated: bed mob, transfers, gt, education    ASSESSMENT   RLE unsteady but not buckling   Activity limitations: Decreased functional mobility   Patient will benefit from continuing skilled physical therapy to improve mobility    Activity Tolerance  Activity Tolerance: Patient Tolerated treatment well     PLAN        Physical therapy to see 7 X/ week for 2 weeks then reassess. Plan of care to include:   Current Treatment Recommendations: Functional Mobility Training, Transfer Training  Gait Training, Safety Education & Training, Patient/Caregiver Education & Training    PT Education     Precautions; Transfer Training; General Safety; Family Education; Equipment; Weight-bearing Education; Gait Training;  Step Training; Functional Mobility Training      GOALS    Short term goals  Time Frame for Short term goals: 2 weeks  Short term goal 1: Independent with bed mobility and transfers  Short term goal 2: Ambulate 400 feet independently  Short term goal 3: Verbalize understanding of ascending and descending steps    Discharge Recommendations:  Home with assist PRN, Patient would benefit from continued therapy after discharge     Comments:  Pt up to w/c, awaiting d/c  Patient instructed to request assistance before getting up  Nursing updated      Electronically signed by Jermaine Saravia PT on 2/2/2022 at 2:13 PM

## 2022-02-02 NOTE — ANESTHESIA PRE PROCEDURE
Department of Anesthesiology  Preprocedure Note       Name:  Frank Justin   Age:  78 y.o.  :  1945                                          MRN:  [id number]         Date:  2022      Surgeon: Heather Ocampo):  Dacia Mejia MD    Procedure: Procedure(s):  RIGHT TOTAL KNEE ARTHROPLASTY    Medications prior to admission:   Prior to Admission medications    Medication Sig Start Date End Date Taking? Authorizing Provider   doxycycline hyclate (VIBRA-TABS) 100 MG tablet Take 1 tablet by mouth 2 times daily for 5 days 22 Yes Dacia Mejia MD   ondansetron [facility]) 4 MG tablet Take 1 tablet by mouth every 8 hours as needed for Nausea or Vomiting 22  Yes Dacia Mejia MD   oxyCODONE (ROXICODONE) 5 MG immediate release tablet Take 1 tablet by mouth See Admin Instructions for 28 days. 1 to 3 tablets po q 3 to 4 hours prn 2/2/22 3/2/22 Yes Dacia Mejia MD   apixaban [facility]) 2.5 MG TABS tablet Take 1 tablet by mouth 2 times daily 22  Yes Dacia Mejia MD   levothyroxine (SYNTHROID) 100 MCG tablet Take 100 mcg by mouth Daily   Yes Historical Provider, MD   Calcium-Vitamin D-Vitamin K (VIACTIV CALCIUM PLUS D) 650-12.5-40 MG-MCG-MCG CHEW Take 1 tablet by mouth 2 times daily    Yes Historical Provider, MD   silodosin (RAPAFLO) 4 MG CAPS capsule Take 1 capsule by mouth every evening 21  Yes REBEKAH Webb - CNP   celecoxib (CELEBREX) 100 MG capsule Take 100 mg by mouth 2 times daily  21  Yes Historical Provider, MD   Cranberry 500 MG TABS Take 1 tablet by mouth daily   Yes Historical Provider, MD   gabapentin (NEURONTIN) 100 MG capsule Take 100 mg by mouth nightly. 21  Yes Historical Provider, MD   magnesium (MAGNESIUM-OXIDE) 250 MG TABS tablet Take 500 mg by mouth daily   Yes Historical Provider, MD   omeprazole (PRILOSEC OTC) 20 MG tablet Take 20 mg by mouth daily    Yes Historical Provider, MD   LORazepam (ATIVAN) 1 MG tablet Take 2 mg by mouth nightly.     Yes DIAGNOSTIC OR SCREENING performed by Esteban Alexander DO at [facility]    TONSILLECTOMY      TUBAL LIGATION         Social History:    Social History     Tobacco Use    Smoking status: Former Smoker     Packs/day: 1.00     Years: 30.00     Pack years: 30.00     Types: Cigarettes     Quit date:      Years since quittin.1    Smokeless tobacco: Never Used   Substance Use Topics    Alcohol use: No                                Counseling given: Not Answered      Vital Signs (Current):   Vitals:    22 0707   BP: (!) 140/79   Pulse: 85   Resp: 18   Temp: 97.4 °F (36.3 °C)   TempSrc: Temporal   SpO2: 97%   Weight: 140 lb (63.5 kg)   Height: 5' 2\" (1.575 m)                                              BP Readings from Last 3 Encounters:   22 (!) 140/79   17 (!) 147/64   17 (!) 143/63       NPO Status: Time of last liquid consumption: 0000                        Time of last solid consumption: 0000                        Date of last liquid consumption: 22                        Date of last solid food consumption: 22    BMI:   Wt Readings from Last 3 Encounters:   22 140 lb (63.5 kg)   22 139 lb (63 kg)   21 140 lb 12.8 oz (63.9 kg)     Body mass index is 25.61 kg/m².     CBC:   Lab Results   Component Value Date    WBC 7.4 2022    RBC 4.14 2022    HGB 12.1 2022    HCT 39.7 2022    HCT 39.1 2012    MCV 95.9 2022    RDW 14.1 2022     2022     2012       CMP:   Lab Results   Component Value Date     2022     2012    K 4.3 2022    K 3.8 2012     2022     2012    CO2 23 2022    BUN 22 2022    CREATININE 0.8 2022    CREATININE 0.7 2012    GFRAA >59 2022    LABGLOM >60 2022    GLUCOSE 76 2022    PROT 7.2 2022    PROT 7.6 2012    CALCIUM 9.4 2022    BILITOT 0.5 2022 ALKPHOS 105 01/21/2022    ALKPHOS 86 04/25/2012    AST 30 01/21/2022    ALT 25 01/21/2022       POC Tests: No results for input(s): POCGLU, POCNA, POCK, POCCL, POCBUN, POCHEMO, POCHCT in the last 72 hours. Coags:   Lab Results   Component Value Date    PROTIME 13.0 01/21/2022    INR 0.96 01/21/2022    APTT 25.3 01/21/2022       HCG (If Applicable): No results found for: PREGTESTUR, PREGSERUM, HCG, HCGQUANT     ABGs: No results found for: PHART, PO2ART, FPJ9NVD, HPH8UXQ, BEART, X3AMTOJV     Type & Screen (If Applicable):  No results found for: LABABO, LABRH    Drug/Infectious Status (If Applicable):  No results found for: HIV, HEPCAB    COVID-19 Screening (If Applicable):   Lab Results   Component Value Date    COVID19 Not Detected 01/31/2022           Anesthesia Evaluation  Patient summary reviewed and Nursing notes reviewed no history of anesthetic complications:   Airway: Mallampati: II  TM distance: >3 FB   Neck ROM: full  Mouth opening: > = 3 FB Dental:          Pulmonary:normal exam        (-) not a current smoker                           Cardiovascular:    (+) hypertension:,       ECG reviewed                        Neuro/Psych:      (-) seizures and CVA           GI/Hepatic/Renal:   (+) GERD: well controlled, renal disease: kidney stones,           Endo/Other:    (+) hypothyroidism: arthritis:., .                 Abdominal:             Vascular: negative vascular ROS. Other Findings:             Anesthesia Plan      general and regional     ASA 2     (Adductor canal block)  Induction: intravenous. MIPS: Postoperative opioids intended and Prophylactic antiemetics administered. Anesthetic plan and risks discussed with patient and spouse.                       Ju Reeves MD   2/2/2022

## 2022-02-02 NOTE — ANESTHESIA PROCEDURE NOTES
Peripheral Block    Patient location during procedure: holding area  Staffing  Performed: anesthesiologist   Anesthesiologist: Mary Prieto MD  Preanesthetic Checklist  Completed: patient identified, IV checked, site marked, risks and benefits discussed, surgical consent, monitors and equipment checked, pre-op evaluation, timeout performed, anesthesia consent given, oxygen available and patient being monitored  Peripheral Block  Patient position: supine  Prep: ChloraPrep  Patient monitoring: cardiac monitor, continuous pulse ox, frequent blood pressure checks and IV access  Block type: Femoral  Laterality: right  Injection technique: single-shot  Guidance: ultrasound guided  Local infiltration: ropivacaine  Infiltration strength: 0.5 %  Dose: 20 mL  Adductor canal  Provider prep: mask and sterile gloves  Local infiltration: ropivacaine  Needle  Needle type: combined needle/nerve stimulator   Needle gauge: 22 G  Needle length: 10 cm  Needle localization: ultrasound guidance  Assessment  Injection assessment: negative aspiration for heme, no paresthesia on injection and local visualized surrounding nerve on ultrasound  Paresthesia pain: none  Slow fractionated injection: yes  Hemodynamics: stable  Additional Notes  Under ultrasound (\"US\") guidance, a 22 gauge needle was inserted and placed in close proximity to the femoral nerve. Ultrasound was also used to visualize the spread of the anesthetic in close proximity to the nerve being blocked. The nerve appeared anatomically normal, and there were no abnormal pathological findings. A permanent image was recorded.      20 mL 0.5% Ropivacaine injected  Medications Administered  Lidocaine injection 1%, 1 mL  ropivacaine (NAROPIN) injection 0.5%, 20 mL  Reason for block: post-op pain management

## 2022-02-02 NOTE — PROGRESS NOTES
CLINICAL PHARMACY NOTE: MEDS TO BEDS    Total # of Prescriptions Filled: 5   The following medications were delivered to the patient:  · Eliquis 2.5 mg  · Ondansetron 4 mg  · Oxycodone 5 mg  · Doxycycline Hyclate 100 mg  · Aspirin 81 mg    Additional Documentation:    Handed scripts to patients  and family members in [facility]

## 2022-02-02 NOTE — DISCHARGE INSTR - DIET

## 2022-02-02 NOTE — H&P
[facility] [place] ([facility]) Pre-Operative History and Physical    Patient Name: Riccardo Toth  : 1945    There were no vitals taken for this visit. Pre-Operative Diagnosis:  Knee arthritits    Proposed Surgical Procedure:   Knee replacement      Past Medical Hisotry:       Diagnosis Date    Allergic rhinitis     Hypertension     Hypothyroidism     Kidney stones     Osteoarthritis      Past Surgical History:       Procedure Laterality Date    CHOLECYSTECTOMY      COLONOSCOPY  2009    COLONOSCOPY  2017    Dr Jeanine Templeton, 5 yr recall    HERNIA REPAIR      MI COLONOSCOPY FLX DX W/COLLJ SPEC WHEN PFRMD N/A 2017    COLONOSCOPY DIAGNOSTIC OR SCREENING performed by  DO Tyler at [facility]    TONSILLECTOMY      TUBAL LIGATION         Medications:   Prior to Admission medications    Medication Sig Start Date End Date Taking? Authorizing Provider   levothyroxine (SYNTHROID) 100 MCG tablet Take 100 mcg by mouth Daily    Historical Provider, MD   Omega 3 1000 MG CAPS Take 1 capsule by mouth daily    Historical Provider, MD   Calcium-Vitamin D-Vitamin K (VIACTIV CALCIUM PLUS D) 650-12.5-40 MG-MCG-MCG CHEW Take 1 tablet by mouth 2 times daily     Historical Provider, MD   silodosin (RAPAFLO) 4 MG CAPS capsule Take 1 capsule by mouth every evening 21   Marisa Ty, APRN - CNP   WAL-ZYR D 5-120 MG per extended release tablet Take 1 tablet by mouth nightly  21   Historical Provider, MD   celecoxib (CELEBREX) 100 MG capsule Take 100 mg by mouth 2 times daily  21   Historical Provider, MD   Cranberry 500 MG TABS Take 1 tablet by mouth daily    Historical Provider, MD   fluticasone (FLONASE) 50 MCG/ACT nasal spray 2 sprays by Nasal route daily as needed    Historical Provider, MD   gabapentin (NEURONTIN) 100 MG capsule Take 100 mg by mouth nightly.   21   Historical Provider, MD   magnesium (MAGNESIUM-OXIDE) 250 MG TABS tablet Take 500 mg by mouth daily    Historical Provider, MD   naproxen sodium (ANAPROX) 220 MG tablet Take 220 mg by mouth 2 times daily as needed    Historical Provider, MD   omeprazole (PRILOSEC OTC) 20 MG tablet     Historical Provider, MD   LORazepam (ATIVAN) 1 MG tablet Take 2 mg by mouth nightly. Historical Provider, MD   losartan (COZAAR) 50 MG tablet Take 100 mg by mouth daily     Historical Provider, MD   Multiple Vitamin (MULTIVITAMIN PO) Take 1 tablet by mouth daily     Historical Provider, MD       Allergies:  Flomax [tamsulosin], Levofloxacin, Pcn [penicillins], Sulfa antibiotics, and Vioxx    Social History:   Tobacco:  reports that she has quit smoking. She has never used smokeless tobacco.   Alcohol:  reports no history of alcohol use. Review of Systems:  General: Denies any fever or chills  EYES: Denies any diplopia  ENT: Tinnitus or vertigo  Resp: Denies any shortness of breath, cough or wheezing  Cardiac: Denies any chest pain, palpitations, claudication or edema  GI: Denies any melena, hematochezia, hematemesis or pyrosis  : Denies any frequency, urgency, hesitancy or incontinence  Musculoskeletal: Denies back pain, joint pain, myalgias  Heme: Denies bruising or bleeding easily  Endocrine: Denies any history of diabetes or thyroid disease  Psych: Denies anxiety or depression  Neuro: Denies any focal motor or sensory deficits    NEUROLOGIC: CN II-XI grossly intact, no motor or sensory deficits   PSYCH: mood and affect are normal with a normal rate and tone of speech    Physical Exam:  Vitals: There were no vitals taken for this visit. CONSTITUTIONAL: Alert, appropriate, no acute distress.   EYES: Non icteric, EOM intact, pupils equal round and reactive to light  ENT: Mucus membranes moist, no oral pharyngeal lesions, nares patent   NECK: Supple, no masses, no JVD, trachea mid line   CHEST/LUNGS: CTA bilaterally, normal respiratory effort   CARDIOVASCULAR: RRR, no murmurs,  2+ DP and radial pulses bilaterally  ABDOMEN: soft, nontender  EXTREMITIES: warm, well perfused, no edema   SKIN: warm, dry with no rashes or lesions  LYMPH: No cervical or inguinal lymphadenopathy    General Appearance: Patient is well nourished, well developed    HEENT: Normal    CARDIOVASCULAR: Normal S1 and S2.  Regular rhythm. No murmurs, gallops, or rubs. PULMONARY: Normal.    GASTROINTESTINAL: Soft, non-tender, normal bowel sounds. No bruits, organomegaly or masses.     EXTREMITIES: positive exam findings: lateral joint line tenderness, tender over tibial tubercle, ecchymosis noted entire limb and ROM limited to approximately 80 degrees    MUSCULOSKELETAL: Tenderness over right hip(s)    NEUROLOGICAL: gait and coordination normal or speech normal    Diagnostic Studies:      Labs: CBC with Differential:    Lab Results   Component Value Date    WBC 7.4 01/21/2022    RBC 4.14 01/21/2022    HGB 12.1 01/21/2022    HCT 39.7 01/21/2022    HCT 39.1 04/25/2012     01/21/2022     04/25/2012    MCV 95.9 01/21/2022    MCH 29.2 01/21/2022    MCHC 30.5 01/21/2022    RDW 14.1 01/21/2022    LYMPHOPCT 34.6 01/21/2022    MONOPCT 10.5 01/21/2022    EOSPCT 0.4 04/25/2012    BASOPCT 1.4 01/21/2022    MONOSABS 0.80 01/21/2022    LYMPHSABS 2.6 01/21/2022    EOSABS 0.60 01/21/2022    BASOSABS 0.10 01/21/2022     BMP:    Lab Results   Component Value Date     01/21/2022     04/25/2012    K 4.3 01/21/2022    K 3.8 04/25/2012     01/21/2022     04/25/2012    CO2 23 01/21/2022    BUN 22 01/21/2022    LABALBU 4.1 01/21/2022    LABALBU 4.3 04/25/2012    CREATININE 0.8 01/21/2022    CREATININE 0.7 04/25/2012    CALCIUM 9.4 01/21/2022    GFRAA >59 01/21/2022    LABGLOM >60 01/21/2022    GLUCOSE 76 01/21/2022     Sodium:    Lab Results   Component Value Date     01/21/2022     04/25/2012     Potassium:    Lab Results   Component Value Date    K 4.3 01/21/2022    K 3.8 04/25/2012     BUN/Creatinine:    Lab Results   Component Value Date BUN 22 01/21/2022    CREATININE 0.8 01/21/2022    CREATININE 0.7 04/25/2012     PT/INR:    Lab Results   Component Value Date    PROTIME 13.0 01/21/2022    INR 0.96 01/21/2022     PTT:    Lab Results   Component Value Date    APTT 25.3 01/21/2022   [APTT}  U/A:    Lab Results   Component Value Date    NITRITE neg 12/20/2021    COLORU YELLOW 01/21/2022    PHUR 7.0 01/21/2022    WBCUA 53 08/25/2017    RBCUA 9 08/25/2017    BACTERIA 3+ 08/25/2017    CLARITYU Clear 01/21/2022    SPECGRAV 1.011 01/21/2022    LEUKOCYTESUR Negative 01/21/2022    UROBILINOGEN 0.2 01/21/2022    BILIRUBINUR Negative 01/21/2022    BILIRUBINUR neg 12/20/2021    BLOODU Negative 01/21/2022    GLUCOSEU Negative 01/21/2022     HgBA1c:  No components found for: HGBA1C        PLAN:  Knee replacement R.      Electronically signed by Kishore Chamorro MD on 2/2/2022 at 7:01 AM

## 2022-02-02 NOTE — BRIEF OP NOTE
Brief Postoperative Note      Patient: Pilo Estrella  YOB: 1945  MRN: [id number]    Date of Procedure: 2/2/2022    Pre-Op Diagnosis: M17.0    Post-Op Diagnosis: Same       Procedure(s):  RIGHT TOTAL KNEE ARTHROPLASTY    Surgeon(s):  Elpidio Marx MD    Assistant:  First Assistant: Catheline Denver    Anesthesia: General    Estimated Blood Loss (mL): 516     Complications: None    Specimens:   * No specimens in log *    Implants:  Implant Name Type Inv.  Item Serial No.  Lot No. LRB No. Used Action   PSN FEM CR POR CCR NRW SZ6 R  PSN FEM CR POR CCR NRW SZ6 R  [employer]- [id number] Right 1 Implanted   PSN TIB POR 2 PEG SZ E R  PSN TIB POR 2 PEG SZ E R  [facility] [id number] Right 1 Implanted   PSN MC VE ASF R 10MM 6-7/EF  PSN MC VE ASF R 10MM 6-7/EF  [facility] [employer]- [id number] Right 1 Implanted         Drains: * No LDAs found *    Findings: TT: 19 minutes    Electronically signed by Elpidio Marx MD on 2/2/2022 at 10:25 AM

## 2022-02-02 NOTE — CARE COORDINATION
Spoke with patient regarding MD orders for [facility] services. Patient agreeable and has chosen [facility]. Referral Faxed. [address] [place] [address] [phone]. [phone]. Please notify [facility] when patient discharges and fax DC Summary,  DC med list and any new [facility] orders. The Patient and/or patient representative  was provided with a choice of provider and agrees   with the discharge plan. [x] Yes [] No    Freedom of choice list was provided with basic dialogue that supports the patient's individualized plan of care/goals, treatment preferences and shares the quality data associated with the providers.  [x] Yes [] No  Electronically signed by Lesly Holley on 2/2/2022 at 12:32 PM

## 2022-02-02 NOTE — ANESTHESIA POSTPROCEDURE EVALUATION
Department of Anesthesiology  Postprocedure Note    Patient: Ludwin Wade  MRN: [id number]  YOB: 1945  Date of evaluation: 2/2/2022  Time:  10:28 AM     Procedure Summary     Date: 02/02/22 Room / Location: [address]    Anesthesia Start: 0830 Anesthesia Stop: 0473    Procedure: RIGHT TOTAL KNEE ARTHROPLASTY (Right Knee) Diagnosis: (M17.0)    Surgeons: Gwen Cross MD Responsible Provider: REBEKAH Arevalo CRNA    Anesthesia Type: general, regional ASA Status: 2          Anesthesia Type: general, regional    Yadi Phase I: Yadi Score: 10    Yadi Phase II:      Last vitals: Reviewed and per EMR flowsheets.        Anesthesia Post Evaluation

## 2022-02-02 NOTE — PROGRESS NOTES
Patient discharged home today with [facility]. Went over all discharge instructions and new medications with patient's family and provided a copy of all new medications to take home. Patient's family verbalized understanding. Patient's stability will be assessed by PT and Op Care RN before discharging home.   Electronically signed by Chuckie Ramos RN on 2/2/2022 at 11:48 AM

## 2022-02-02 NOTE — CARE COORDINATION
Karli Chavarria, RN Ortho Navigator  [phone]    Patient would like dme item to be delivered to [address]. Patient is scheduled to discharge home today at noon. Please call if you have any questions.   Patient Information    Patient Name   Shamar Morelos ([id number]) Legal Sex   Female    1945   Room Bed   [facility] OR Pool NONE       Basic Information    Date Of Birth   1945 Gender Identity   Female Race   White (non-) Ethnic Group   Non- / Non  Preferred Language   English Preferred Written Language   English    Status   No [002]       Patient Demographics    Address   [address]   [phone] (Home)   [phone] (Mobile)     PCP and Center    Primary Care Provider [address], [address] [facility] [address] [facility]     Emergency Contact(s)    Name Relation Home Work Mobile   Parmjit Trevino Spouse [phone]       Documents on File     Status Date Received Description   Documents for the Patient   HIPAA Notice of Privacy Signed () 11    Photo ID Received () 17    Insurance Card Received () 17    Physician Office Consent for Treatment Not Received     ACP-Advance Directive Not Received     ACP-Power of  Not Received     Financial Responsibility Form Not Received ()     Colonoscopy   09 COLON/ PATH   Colonoscopy   3/1/06 COLON/ PATH   H&P   09 GI OFFICE NOTE   (OLD) [facility] ([facility] Physician Consent and NPP Signed () 02/19/15    Financial Assistance Program Applications Not Received     MyChart Adult Proxy Access Not Received     MyChart Child Proxy Access Not Received     ACO Consent for the Release of  Confidential Alcohol or Drug Treatment Information Not Received     ACO Declining to [facility] [address] Not Received     Photo ID Received () 02/19/15    Insurance Card Received () 02/19/15    Insurance Card Received

## 2022-02-03 NOTE — OP NOTE
[facility]                                OPERATIVE REPORT    PATIENT NAME: Nita Swan                    :        1945  MED REC NO:   [id number]                              ROOM:  ACCOUNT NO:   [de-identified]                           ADMIT DATE: 2022  PROVIDER:     Shaun Moore MD    DATE OF PROCEDURE:  2022    PREOPERATIVE DIAGNOSIS:  Primary osteoarthritis, right knee. POSTOPERATIVE DIAGNOSIS:  Primary osteoarthritis, right knee. PROCEDURE:  Right total knee arthroplasty. SURGEON:  Shaun Moore MD    FIRST ASSISTANT:  Catheline Denver. ANESTHESIA:  General with adductor canal block. EBL:  320 mL. FLUIDS:  1600 mL of crystalloid. TOURNIQUET TIME:  _____. COMPONENTS USED:  Viviana Persona knee system, femur size 7 narrow CR TM  press-fit femur, tibia is size E TM press-fit tibia, polyethylene is  size 10 MC polyethylene. INDICATIONS:  This is a 73-year-old lady with severe arthritis of the  right knee, failing conservative care. Because of this, she elected for  the above procedure. OPERATIVE PROCEDURE:  After informed consent, she was given 2 gm of  Ancef, 1 gm of tranexamic acid, underwent adductor canal block and  general anesthetic. Tourniquet was placed around the right upper thigh. Left leg was placed in SCD. Right leg was prepped and draped in the  usual sterile fashion. Leg was Esmarched. Tourniquet was inflated to  200 mmHg. Midline incision was made. Parapatellar approach was made. Prepatellar fat pad was partially excised. Synovium on the distal femur  was elevated. Intramedullary canal of the femur was drilled into. Distal resection was made in 3 degrees of valgus taking a +2 cut. Medial cut 10 mm, lateral cut 6 mm. We sized this to a size 6 femur. This was placed in 5 degrees of external rotation. Anterior, posterior,  and chamfer cuts were made. Posteromedial cut 10 mm, posterolateral cut  6.5 mm. The tibia was exposed. Menisci excised. The 7-degree cutting  guide was placed in line with the anterior tibial crest proximally and  the middle of the ankle distally and tibial resection was made. Tourniquet was released. We were a little bit tighter in flexion and  extension. We used our spacer block. We sized the tibia to a size E  tibia which was placed in line with the anterior tibial crest.  A trial  liner was placed. Fluoroscopic views revealed that we were in a touch  of valgus, so a 2-degree varus re-cutting block was used. This nicely  balanced the knee. We then drilled our lug holes for the femur and the  tibia. A lateral facet release was done on the patella. We mixed 20 mL  of Exparel with 30 mL of saline and 50 mL of 0.25% Marcaine. We  injected the posteromedial capsule with 40 mL and 60 mL in the  subcutaneous tissues. Following this, we then press-fit the size E TM  press-fit tibia. Please note that the TUG test was negative. We then  press-fit the size 6 CR femur and felt to have excellent purchase. Repeat trial reduction was done. The final MC liner was locked in the  tibial tray. We had full extension of the knee with perfect midline  tracking of the patella. Fluoroscopic views revealed we had a good  perpendicular cut to the long axis of the tibia. We also confirmed that  the trabecular metal pegs were not violating the cortex laterally or  medially at all. There was slight overhanging of the tray which in her  situation is ideal as we wanted to get a good rim fit on her bone. Again, a total of 3 liters of irrigation was used. After this, we then  closed parapatellar approach with interrupted #1 Vicryl suture, 2-0  Vicryl suture for subcutaneous tissues, and 3-0 Vicryl for subcuticular  stitch. Prineo Dermabond and soft dressings were applied.   The patient  was taken to the recovery room in stable condition. POSTOPERATIVE PLANS:  1. The patient will be on typical total postop protocol. 2.  She will be on Bactrim DS one p.o. b.i.d. for 5 days. 3.  She will be on Xarelto 10 mg a day for 21 days followed by baby  aspirin 81 mg twice a day for 3 weeks. Please note, preoperatively, range of motion to her right knee was 10 to  120 degrees.         Sid Chapman MD    D: 02/02/2022 11:23:08      T: 02/02/2022 14:06:55     [email or web address]  Job#: [id number]     Doc#: [id number]

## 2022-02-04 ENCOUNTER — TELEPHONE (OUTPATIENT)
Dept: INPATIENT UNIT | Age: 77
End: 2022-02-04

## 2022-02-10 ENCOUNTER — TRANSCRIBE ORDERS (OUTPATIENT)
Dept: ADMINISTRATIVE | Facility: HOSPITAL | Age: 77
End: 2022-02-10

## 2022-02-10 ENCOUNTER — HOSPITAL ENCOUNTER (OUTPATIENT)
Dept: ULTRASOUND IMAGING | Facility: HOSPITAL | Age: 77
Discharge: HOME | End: 2022-02-10
Admitting: ORTHOPAEDIC SURGERY

## 2022-02-10 DIAGNOSIS — M79.604 PAIN IN RIGHT LEG: ICD-10-CM

## 2022-02-10 DIAGNOSIS — M79.604 PAIN IN RIGHT LEG: Primary | ICD-10-CM

## 2022-02-10 PROCEDURE — 93971 EXTREMITY STUDY: CPT

## 2022-03-14 ENCOUNTER — TELEPHONE (OUTPATIENT)
Dept: GASTROENTEROLOGY | Age: 77
End: 2022-03-14

## 2022-03-14 NOTE — TELEPHONE ENCOUNTER
Lauraine Snellen requests that office  return their call. The best time to reach her is Anytime. Patient received her 5 YR CLN RECALL FROM PREVIOUS (4/13/17) ,please call patient    Thank you.

## 2022-04-06 ENCOUNTER — OFFICE VISIT (OUTPATIENT)
Dept: GASTROENTEROLOGY | Age: 77
End: 2022-04-06
Payer: MEDICARE

## 2022-04-06 VITALS
HEART RATE: 76 BPM | SYSTOLIC BLOOD PRESSURE: 128 MMHG | HEIGHT: 63 IN | OXYGEN SATURATION: 96 % | DIASTOLIC BLOOD PRESSURE: 72 MMHG | BODY MASS INDEX: 24.45 KG/M2 | WEIGHT: 138 LBS

## 2022-04-06 DIAGNOSIS — R19.8 ANAL DISCHARGE: ICD-10-CM

## 2022-04-06 DIAGNOSIS — K62.5 BRBPR (BRIGHT RED BLOOD PER RECTUM): Primary | ICD-10-CM

## 2022-04-06 DIAGNOSIS — R14.2 BURPING: ICD-10-CM

## 2022-04-06 DIAGNOSIS — Z86.19 HISTORY OF HELICOBACTER PYLORI INFECTION: ICD-10-CM

## 2022-04-06 DIAGNOSIS — Z86.010 PERSONAL HISTORY OF COLONIC POLYPS: ICD-10-CM

## 2022-04-06 DIAGNOSIS — R11.0 NAUSEA: ICD-10-CM

## 2022-04-06 DIAGNOSIS — K62.9 RECTAL ABNORMALITY: ICD-10-CM

## 2022-04-06 DIAGNOSIS — R14.2 BELCHING: ICD-10-CM

## 2022-04-06 PROBLEM — Z86.0100 PERSONAL HISTORY OF COLONIC POLYPS: Status: ACTIVE | Noted: 2022-04-06

## 2022-04-06 PROCEDURE — 1123F ACP DISCUSS/DSCN MKR DOCD: CPT | Performed by: NURSE PRACTITIONER

## 2022-04-06 PROCEDURE — G8399 PT W/DXA RESULTS DOCUMENT: HCPCS | Performed by: NURSE PRACTITIONER

## 2022-04-06 PROCEDURE — 99214 OFFICE O/P EST MOD 30 MIN: CPT | Performed by: NURSE PRACTITIONER

## 2022-04-06 PROCEDURE — 3017F COLORECTAL CA SCREEN DOC REV: CPT | Performed by: NURSE PRACTITIONER

## 2022-04-06 PROCEDURE — G8420 CALC BMI NORM PARAMETERS: HCPCS | Performed by: NURSE PRACTITIONER

## 2022-04-06 PROCEDURE — G8427 DOCREV CUR MEDS BY ELIG CLIN: HCPCS | Performed by: NURSE PRACTITIONER

## 2022-04-06 PROCEDURE — 1036F TOBACCO NON-USER: CPT | Performed by: NURSE PRACTITIONER

## 2022-04-06 PROCEDURE — 4040F PNEUMOC VAC/ADMIN/RCVD: CPT | Performed by: NURSE PRACTITIONER

## 2022-04-06 PROCEDURE — 1090F PRES/ABSN URINE INCON ASSESS: CPT | Performed by: NURSE PRACTITIONER

## 2022-04-06 ASSESSMENT — ENCOUNTER SYMPTOMS
DIARRHEA: 0
TROUBLE SWALLOWING: 0
CONSTIPATION: 0
ABDOMINAL DISTENTION: 0
NAUSEA: 1
SORE THROAT: 0
BLOOD IN STOOL: 0
VOICE CHANGE: 0
VOMITING: 0
ABDOMINAL PAIN: 0
COUGH: 0
ANAL BLEEDING: 1
RECTAL PAIN: 0
BACK PAIN: 1
SHORTNESS OF BREATH: 1

## 2022-04-06 NOTE — PROGRESS NOTES
Subjective:      Sarina Thompson is a78 y.o. female  Chief Complaint   Patient presents with    Other     back here for GI care, rectal bleeding and nausea       HPI  PCP: Sandra Bowie MD  Pt made an appt   Was a pt here with Dr Corina Cronin  Transferred care to [facility]  And is back here to reestablish care    Reports brbpr. On TP with wiping. And has the sensation she has a \"ball\" like feeling that comes and goes out of her rectum. With clear rectal discharge that gets on her panties. Chronic and intermittent for about 2 years. Reports nausea. Started about 3 months ago  Waxes and wanes. Not able to identify any triggers  No vomiting  S/p eric. Reports belching  Boisterous burping. Started a year ago. Has hx of h pylori  She reports she took treatment but never got testing to assure eradication. Has heartburn  Well controlled with OTC prilosec daily. Unless she overendulges in trigger foods. Family HX:    Pt denies family hx of colon polyps, colon CA, inflammatory bowel dx, gastric CA and esophageal CA.     Past Medical History:   Diagnosis Date    Allergic rhinitis     Hypertension     Hypothyroidism     Kidney stones     Osteoarthritis           Past Surgical History:   Procedure Laterality Date    CHOLECYSTECTOMY      COLONOSCOPY  04/16/2009    Dr Eloise Guajardo sigmoid diverticula, AP, 2 yr recall    COLONOSCOPY  03/08/2012    Dr Amrit Hernandez, 5 yr recall    COLONOSCOPY  06/07/2019    Dr Nick Forrest small mouthed diverticula in the sigmoid and descending colon, 5 yr recall    COLONOSCOPY  03/01/2006    Dr Eloise Guajardo diverticulosis, HP, 3 yr recall    HERNIA REPAIR      [place] COLONOSCOPY FLX DX W/COLLJ SPEC WHEN PFRMD N/A 04/13/2017    Dr Chema Frost, 5 yr recall    TONSILLECTOMY      TOTAL KNEE ARTHROPLASTY Right 02/02/2022    RIGHT TOTAL KNEE ARTHROPLASTY performed by Sathish Strong MD at [address] 2019    Dr Alfonso Jose, gastritis, urease (+)       Social History     Socioeconomic History    Marital status:      Spouse name: None    Number of children: None    Years of education: None    Highest education level: None   Occupational History    None   Tobacco Use    Smoking status: Former Smoker     Years: 30.00     Types: Cigarettes     Quit date:      Years since quittin.2    Smokeless tobacco: Never Used   Vaping Use    Vaping Use: Never used   Substance and Sexual Activity    Alcohol use: No    Drug use: No    Sexual activity: None   Other Topics Concern    None   Social History Narrative    None     Social Determinants of Health     Financial Resource Strain:     Difficulty of Paying Living Expenses: Not on file   Food Insecurity:     Worried About Running Out of Food in the Last Year: Not on file    Zabrina of Food in the Last Year: Not on file   Transportation Needs:     Lack of Transportation (Medical): Not on file    Lack of Transportation (Non-Medical):  Not on file   Physical Activity:     Days of Exercise per Week: Not on file    Minutes of Exercise per Session: Not on file   Stress:     Feeling of Stress : Not on file   Social Connections:     Frequency of Communication with Friends and Family: Not on file    Frequency of Social Gatherings with Friends and Family: Not on file    Attends Congregation Services: Not on file    Active Member of [address] [place] [address] or Organizations: Not on file    Attends Club or Organization Meetings: Not on file    Marital Status: Not on file   Intimate Partner Violence:     Fear of Current or Ex-Partner: Not on file    Emotionally Abused: Not on file    Physically Abused: Not on file    Sexually Abused: Not on file   Housing Stability:     Unable to Pay for Housing in the Last Year: Not on file    Number of [place] in the Last Year: Not on file    Unstable Housing in the Last Year: Not on file       Allergies Allergen Reactions    Flomax [Tamsulosin] Other (See Comments)     HOSPITALIZED FOR HYPOTENSION    Levofloxacin Other (See Comments)     Pt cant remember reaction    Pcn [Penicillins] Rash     HAVEN'T TAKEN SINCE CHILDHOOD    Sulfa Antibiotics Rash    Vioxx Diarrhea       Current Outpatient Medications   Medication Sig Dispense Refill    levothyroxine (SYNTHROID) 100 MCG tablet Take 100 mcg by mouth Daily      Calcium-Vitamin D-Vitamin K (VIACTIV CALCIUM PLUS D) 650-12.5-40 MG-MCG-MCG CHEW Take 1 tablet by mouth 2 times daily       silodosin (RAPAFLO) 4 MG CAPS capsule Take 1 capsule by mouth every evening 30 capsule 6    WAL-ZYR D 5-120 MG per extended release tablet Take 1 tablet by mouth nightly       celecoxib (CELEBREX) 100 MG capsule Take 100 mg by mouth 2 times daily       Cranberry 500 MG TABS Take 1 tablet by mouth daily      fluticasone (FLONASE) 50 MCG/ACT nasal spray 2 sprays by Nasal route daily as needed      gabapentin (NEURONTIN) 100 MG capsule Take 100 mg by mouth nightly.  magnesium (MAGNESIUM-OXIDE) 250 MG TABS tablet Take 500 mg by mouth daily      omeprazole (PRILOSEC OTC) 20 MG tablet Take 20 mg by mouth daily       LORazepam (ATIVAN) 1 MG tablet Take 2 mg by mouth nightly.  losartan (COZAAR) 50 MG tablet Take 100 mg by mouth daily       Multiple Vitamin (MULTIVITAMIN PO) Take 1 tablet by mouth daily        No current facility-administered medications for this visit. Review of Systems   Constitutional: Negative for appetite change, fatigue, fever and unexpected weight change. HENT: Negative for sore throat, trouble swallowing and voice change. Respiratory: Positive for shortness of breath (intermittently). Negative for cough. Cardiovascular: Negative for chest pain, palpitations and leg swelling. Gastrointestinal: Positive for anal bleeding and nausea.  Negative for abdominal distention, abdominal pain, blood in stool, constipation, diarrhea, rectal pain and vomiting. Genitourinary: Negative for hematuria. Musculoskeletal: Positive for arthralgias and back pain. Negative for neck pain. Neurological: Negative for dizziness, weakness, light-headedness and headaches. Psychiatric/Behavioral: Negative for dysphoric mood and sleep disturbance. The patient is nervous/anxious. All other systems reviewed and are negative. Objective:     Physical Exam  Vitals and nursing note reviewed. Constitutional:       Appearance: She is well-developed. Comments: /72   Pulse 76   Ht 5' 3\" (1.6 m)   Wt 138 lb (62.6 kg)   SpO2 96%   BMI 24.45 kg/m²    Eyes:      General: No scleral icterus. Conjunctiva/sclera: Conjunctivae normal.      Pupils: Pupils are equal, round, and reactive to light. Neck:      Thyroid: No thyromegaly. Cardiovascular:      Rate and Rhythm: Normal rate and regular rhythm. Heart sounds: Normal heart sounds. No murmur heard. No friction rub. No gallop. Pulmonary:      Effort: Pulmonary effort is normal. No respiratory distress. Breath sounds: Normal breath sounds. Abdominal:      General: Bowel sounds are normal. There is no distension. Palpations: Abdomen is soft. Tenderness: There is no abdominal tenderness. There is no rebound. Musculoskeletal:         General: No deformity. Normal range of motion. Cervical back: Normal range of motion and neck supple. Neurological:      Mental Status: She is alert and oriented to person, place, and time. Cranial Nerves: No cranial nerve deficit. Psychiatric:         Judgment: Judgment normal.           Assessment:       Diagnosis Orders   1. BRBPR (bright red blood per rectum)  COLONOSCOPY W/ OR W/O BIOPSY   2. Anal discharge  COLONOSCOPY W/ OR W/O BIOPSY   3. Nausea  ESOPHAGOSCOPY / EGD   4. History of Helicobacter pylori infection  ESOPHAGOSCOPY / EGD   5. Belching  ESOPHAGOSCOPY / EGD   6. Burping  ESOPHAGOSCOPY / EGD   7.  Rectal abnormality COLONOSCOPY W/ OR W/O BIOPSY   8. Personal history of colonic polyps  COLONOSCOPY W/ OR W/O BIOPSY         Plan:      1. Offered a RX PPI, she defers at this time  2. Schedule outpatient endoscopy r/o h pylori. Patient advised no Aspirin, Fish Oil, Vit E or NSAIDs 5 (five) days before procedure. Follow-up Visit: per Dr. Sree Eid  Pt Education:   Risks, benefits, and alternatives to endoscopy were discussed. Patient voices understanding of risks of, but not limited to, perforation, bleeding, and infection. The risk of perforation is increased with esophageal dilatation. All questions answered to patient's satisfaction. Patient is agreable to proceed. 3. Schedule outpatient colonoscopy. Patient advised no Aspirin, Fish Oil, Vit E or NSAIDs 5 (five) days before procedure. Follow-up Visit: per Dr Sree Eid  Pt education:  Risks, benefits, and alternatives to colonoscopy were discussed. Risks of colonoscopy include, but are not limited to, perforation, bleeding, and infection. We discussed that the risk for perforation is 1-3 in 5,000  at the time of colonoscopy;   and 1-2% risk of bleeding post-polypectomy. All questions answered to the satisfaction of the patient. Pt is agreeable to proceed.

## 2022-04-06 NOTE — PATIENT INSTRUCTIONS

## 2022-04-07 ENCOUNTER — TELEPHONE (OUTPATIENT)
Dept: GASTROENTEROLOGY | Age: 77
End: 2022-04-07

## 2022-05-10 ENCOUNTER — TRANSCRIBE ORDERS (OUTPATIENT)
Dept: ADMINISTRATIVE | Facility: HOSPITAL | Age: 77
End: 2022-05-10

## 2022-05-10 DIAGNOSIS — Z12.31 ENCOUNTER FOR SCREENING MAMMOGRAM FOR MALIGNANT NEOPLASM OF BREAST: Primary | ICD-10-CM

## 2022-05-10 DIAGNOSIS — Z78.0 POSTMENOPAUSAL STATUS: ICD-10-CM

## 2022-05-12 ENCOUNTER — ANESTHESIA EVENT (OUTPATIENT)
Dept: OPERATING ROOM | Age: 77
End: 2022-05-12

## 2022-05-16 ENCOUNTER — HOSPITAL ENCOUNTER (OUTPATIENT)
Age: 77
Setting detail: SPECIMEN
Discharge: HOME | End: 2022-05-16
Payer: MEDICARE

## 2022-05-16 ENCOUNTER — APPOINTMENT (OUTPATIENT)
Dept: OPERATING ROOM | Age: 77
End: 2022-05-16

## 2022-05-16 ENCOUNTER — ANESTHESIA (OUTPATIENT)
Dept: OPERATING ROOM | Age: 77
End: 2022-05-16

## 2022-05-16 ENCOUNTER — HOSPITAL ENCOUNTER (OUTPATIENT)
Age: 77
Setting detail: OUTPATIENT SURGERY
Discharge: HOME | End: 2022-05-16
Attending: INTERNAL MEDICINE | Admitting: INTERNAL MEDICINE
Payer: MEDICARE

## 2022-05-16 VITALS
HEIGHT: 63 IN | WEIGHT: 138 LBS | SYSTOLIC BLOOD PRESSURE: 141 MMHG | DIASTOLIC BLOOD PRESSURE: 71 MMHG | BODY MASS INDEX: 24.45 KG/M2 | HEART RATE: 79 BPM | OXYGEN SATURATION: 98 % | TEMPERATURE: 98.1 F | RESPIRATION RATE: 20 BRPM

## 2022-05-16 PROCEDURE — 88305 TISSUE EXAM BY PATHOLOGIST: CPT

## 2022-05-16 PROCEDURE — G8907 PT DOC NO EVENTS ON DISCHARG: HCPCS

## 2022-05-16 PROCEDURE — 88342 IMHCHEM/IMCYTCHM 1ST ANTB: CPT

## 2022-05-16 PROCEDURE — G8918 PT W/O PREOP ORDER IV AB PRO: HCPCS

## 2022-05-16 PROCEDURE — 43239 EGD BIOPSY SINGLE/MULTIPLE: CPT

## 2022-05-16 PROCEDURE — 43239 EGD BIOPSY SINGLE/MULTIPLE: CPT | Performed by: INTERNAL MEDICINE

## 2022-05-16 PROCEDURE — 45380 COLONOSCOPY AND BIOPSY: CPT | Performed by: INTERNAL MEDICINE

## 2022-05-16 PROCEDURE — 45380 COLONOSCOPY AND BIOPSY: CPT

## 2022-05-16 RX ORDER — SODIUM CHLORIDE 9 MG/ML
INJECTION, SOLUTION INTRAVENOUS CONTINUOUS
Status: DISCONTINUED | OUTPATIENT
Start: 2022-05-16 | End: 2022-05-16 | Stop reason: HOSPADM

## 2022-05-16 RX ORDER — PROPOFOL 10 MG/ML
INJECTION, EMULSION INTRAVENOUS PRN
Status: DISCONTINUED | OUTPATIENT
Start: 2022-05-16 | End: 2022-05-16 | Stop reason: SDUPTHER

## 2022-05-16 RX ORDER — LIDOCAINE HYDROCHLORIDE 10 MG/ML
INJECTION, SOLUTION INFILTRATION; PERINEURAL PRN
Status: DISCONTINUED | OUTPATIENT
Start: 2022-05-16 | End: 2022-05-16 | Stop reason: SDUPTHER

## 2022-05-16 RX ORDER — OMEPRAZOLE 40 MG/1
40 CAPSULE, DELAYED RELEASE ORAL
Qty: 90 CAPSULE | Refills: 3 | Status: SHIPPED | OUTPATIENT
Start: 2022-05-16

## 2022-05-16 RX ORDER — CYCLOBENZAPRINE HCL 5 MG
5 TABLET ORAL NIGHTLY PRN
COMMUNITY
End: 2022-06-22

## 2022-05-16 RX ADMIN — LIDOCAINE HYDROCHLORIDE 40 MG: 10 INJECTION, SOLUTION INFILTRATION; PERINEURAL at 12:09

## 2022-05-16 RX ADMIN — SODIUM CHLORIDE: 9 INJECTION, SOLUTION INTRAVENOUS at 11:26

## 2022-05-16 RX ADMIN — PROPOFOL 300 MG: 10 INJECTION, EMULSION INTRAVENOUS at 12:09

## 2022-05-16 NOTE — ANESTHESIA PRE PROCEDURE
Department of Anesthesiology  Preprocedure Note       Name:  Jeremie Telles   Age:  78 y.o.  :  1945                                          MRN:  [id number]         Date:  2022      Surgeon: Sanjeev Asencio):  Lalo Eli MD    Procedure: Procedure(s):  EGD BIOPSY  COLONOSCOPY DIAGNOSTIC    Medications prior to admission:   Prior to Admission medications    Medication Sig Start Date End Date Taking? Authorizing Provider   cyclobenzaprine (FLEXERIL) 5 MG tablet Take 5 mg by mouth nightly as needed for Muscle spasms   Yes Historical Provider, MD   levothyroxine (SYNTHROID) 100 MCG tablet Take 100 mcg by mouth Daily    Historical Provider, MD   Calcium-Vitamin D-Vitamin K (VIACTIV CALCIUM PLUS D) 650-12.5-40 MG-MCG-MCG CHEW Take 1 tablet by mouth 2 times daily     Historical Provider, MD   silodosin (RAPAFLO) 4 MG CAPS capsule Take 1 capsule by mouth every evening 21   Supa Ty, APRN - CNP   WAL-ZYR D 5-120 MG per extended release tablet Take 1 tablet by mouth nightly  21   Historical Provider, MD   celecoxib (CELEBREX) 100 MG capsule Take 100 mg by mouth 2 times daily  21   Historical Provider, MD   Cranberry 500 MG TABS Take 1 tablet by mouth daily    Historical Provider, MD   fluticasone (FLONASE) 50 MCG/ACT nasal spray 2 sprays by Nasal route daily as needed    Historical Provider, MD   gabapentin (NEURONTIN) 100 MG capsule Take 100 mg by mouth nightly. 21   Historical Provider, MD   magnesium (MAGNESIUM-OXIDE) 250 MG TABS tablet Take 500 mg by mouth daily    Historical Provider, MD   omeprazole (PRILOSEC OTC) 20 MG tablet Take 20 mg by mouth daily     Historical Provider, MD   LORazepam (ATIVAN) 1 MG tablet Take 2 mg by mouth nightly.      Historical Provider, MD   losartan (COZAAR) 50 MG tablet Take 100 mg by mouth daily     Historical Provider, MD   Multiple Vitamin (MULTIVITAMIN PO) Take 1 tablet by mouth daily     Historical Provider, MD       Current medications: Current Facility-Administered Medications   Medication Dose Route Frequency Provider Last Rate Last Admin    0.9 % sodium chloride infusion   IntraVENous Continuous Yaima Mcfadden MD           Allergies:     Allergies   Allergen Reactions    Flomax [Tamsulosin] Other (See Comments)     HOSPITALIZED FOR HYPOTENSION    Levofloxacin Other (See Comments)     Pt cant remember reaction    Pcn [Penicillins] Rash     HAVEN'T TAKEN SINCE CHILDHOOD    Sulfa Antibiotics Rash    Vioxx Diarrhea       Problem List:    Patient Active Problem List   Diagnosis Code    Adenomatous polyp of colon D12.6    History of colonic polyps Z86.010    Family history of polyps in the colon Z83.71    BRBPR (bright red blood per rectum) K62.5    Anal discharge R19.8    Nausea R11.0    History of Helicobacter pylori infection Z86.19    Belching R14.2    Burping R14.2    Personal history of colonic polyps Z86.010       Past Medical History:        Diagnosis Date    Allergic rhinitis     Hypertension     Hypothyroidism     Kidney stones     Osteoarthritis        Past Surgical History:        Procedure Laterality Date    CHOLECYSTECTOMY      COLONOSCOPY  04/16/2009    Dr Gema Harper sigmoid diverticula, AP, 2 yr recall    COLONOSCOPY  03/08/2012    Dr Ousmane Vicente, 5 yr recall    COLONOSCOPY  06/07/2019    Dr Thania Lancaster small mouthed diverticula in the sigmoid and descending colon, 5 yr recall    COLONOSCOPY  03/01/2006    Dr Gema Harper diverticulosis, HP, 3 yr recall    HERNIA REPAIR      [place] COLONOSCOPY FLX DX W/COLLJ SPEC WHEN PFRMD N/A 04/13/2017    Dr Mark Fish, 5 yr recall    TONSILLECTOMY      TOTAL KNEE ARTHROPLASTY Right 02/02/2022    RIGHT TOTAL KNEE ARTHROPLASTY performed by Aden Hanna MD at [facility] OR    TUBAL LIGATION      UPPER GASTROINTESTINAL ENDOSCOPY  05/30/2019    Dr Elda Chisholm, gastritis, urease (+)       Social History:    Social History     Tobacco Use    Smoking status: Former Smoker     Years: 30.00     Types: Cigarettes     Quit date:      Years since quittin.3    Smokeless tobacco: Never Used   Substance Use Topics    Alcohol use: No                                Counseling given: Not Answered      Vital Signs (Current): There were no vitals filed for this visit. BP Readings from Last 3 Encounters:   22 128/72   22 (!) 177/88   22 137/77       NPO Status:                                                                                 BMI:   Wt Readings from Last 3 Encounters:   22 138 lb (62.6 kg)   22 140 lb (63.5 kg)   22 139 lb (63 kg)     There is no height or weight on file to calculate BMI.    CBC:   Lab Results   Component Value Date    WBC 7.4 2022    RBC 4.14 2022    HGB 12.1 2022    HCT 39.7 2022    HCT 39.1 2012    MCV 95.9 2022    RDW 14.1 2022     2022     2012       CMP:   Lab Results   Component Value Date     2022     2012    K 4.3 2022    K 3.8 2012     2022     2012    CO2 23 2022    BUN 22 2022    CREATININE 0.8 2022    CREATININE 0.7 2012    GFRAA >59 2022    LABGLOM >60 2022    GLUCOSE 76 2022    PROT 7.2 2022    PROT 7.6 2012    CALCIUM 9.4 2022    BILITOT 0.5 2022    ALKPHOS 105 2022    ALKPHOS 86 2012    AST 30 2022    ALT 25 2022       POC Tests: No results for input(s): POCGLU, POCNA, POCK, POCCL, POCBUN, POCHEMO, POCHCT in the last 72 hours.     Coags:   Lab Results   Component Value Date    PROTIME 13.0 2022    INR 0.96 2022    APTT 25.3 2022       HCG (If Applicable): No results found for: PREGTESTUR, PREGSERUM, HCG, HCGQUANT     ABGs: No results found for: PHART, PO2ART, POD8TSR, VXX5RLA, BEART, A6VYMAND     Type & Screen (If Applicable):  No results found for: LABABO, LABRH    Drug/Infectious Status (If Applicable):  No results found for: HIV, HEPCAB    COVID-19 Screening (If Applicable):   Lab Results   Component Value Date    COVID19 Not Detected 01/31/2022           Anesthesia Evaluation  Patient summary reviewed and Nursing notes reviewed  Airway: Mallampati: II  TM distance: >3 FB   Neck ROM: full  Mouth opening: > = 3 FB Dental: normal exam         Pulmonary:Negative Pulmonary ROS and normal exam                               Cardiovascular:Negative CV ROS  Exercise tolerance: good (>4 METS),   (+) hypertension:,                   Neuro/Psych:   Negative Neuro/Psych ROS              GI/Hepatic/Renal: Neg GI/Hepatic/Renal ROS            Endo/Other:    (+) hypothyroidism::., .                 Abdominal:             Vascular: negative vascular ROS. Other Findings:             Anesthesia Plan      general and TIVA     ASA 3       Induction: intravenous. Anesthetic plan and risks discussed with patient. Plan discussed with CRNA.                   REBEKAH Seay - CHRISTIAN   5/16/2022

## 2022-05-16 NOTE — OP NOTE
Patient: Eliana Medina: 1945  Med Rec#: [id number] Acc#: [id number]   Primary Care Provider Randy Roland MD    Date of Procedure:  5/16/2022    Endoscopist: Anahi Abraham MD    Referring Provider: Randy Roland MD, REBEKAH Cervantes    Operation Performed: Colonoscopy with biopsy    Indications: rectal bleeding, rectal abnormality     Anesthesia:  Sedation was administered by anesthesia who monitored the patient during the procedure. I met with Geoffrey Allison prior to procedure. We discussed the procedure itself, and I have discussed the risks of endoscopy (including-- but not limited to-- pain, discomfort, bleeding potentially requiring second endoscopic procedure and/or blood transfusion, organ perforation requiring operative repair, damage to organs near the colon, infection, aspiration, cardiopulmonary/allergic reaction), benefits, indications to endoscopy. Additionally, we discussed options other than colonoscopy. The patient expressed understanding. All questions answered. The patient decided to proceed with the procedure. Signed informed consent was placed on the chart. Blood Loss: minimal    Withdrawal time: n/a  Bowel Prep: adequate     Complications: no immediate complications    DESCRIPTION OF PROCEDURE:     A time out was performed. After written informed consent was obtained, the patient was placed in the left lateral position. The perianal area was inspected, and a digital rectal exam was performed. A rectal exam was performed: normal tone, no palpable lesions. At this point, a forward viewing Olympus colonoscope was inserted into the anus and carefully advanced to the cecum. The cecum was identified by the ileocecal valve and the appendiceal orifice. The colonoscope was then slowly withdrawn with careful inspection of the mucosa in a linear and circumferential fashion. The scope was retroflexed in the rectum.  Suction was utilized during the procedure to remove as much air as possible from the bowel. The colonoscope was removed from the patient, and the procedure was terminated. Findings are listed below. Findings: The mucosa appeared normal throughout the entire examined colon. In the sigmoid, a 3 mm sessile polyp was removed completely with forceps polypectomy. There is a patchy area of erythema in the rectum, questionable proctitis vs prolapsed mucosa- biopsied. There was evidence of diverticular disease throughout the left colon. Retroflexion in the rectum was normal and revealed no further abnormalities. Recommendations:  1. Repeat colonoscopy: pending pathology -   2. Await biopsy results  3. Miralax daily     Findings and recommendations were discussed w/ the patient. A copy of the images was provided. Marta Griggs am scribing for and in the presence of Dr. Malu Leggett MD.  Electronically signed by Leydi Mccollum RN on 5/16/2022 at 11:02 AM    I personally performed the services described in this documentation as scribed by Irene Beck, and it appears accurate and complete.      Malu Leggett MD  5/16/2022

## 2022-05-16 NOTE — ANESTHESIA POSTPROCEDURE EVALUATION
Department of Anesthesiology  Postprocedure Note    Patient: Washington Grider  MRN: [id number]  YOB: 1945  Date of evaluation: 5/16/2022  Time:  12:23 PM     Procedure Summary     Date: 05/16/22 [facility] ENDO [facility] / [address]    Anesthesia Start: 7585 Anesthesia Stop:     Procedures:       EGD BIOPSY (N/A Esophagus)      COLONOSCOPY DIAGNOSTIC (N/A Abdomen) Diagnosis: (BELCHING, BURPING, NAUSEA, RB, ANAL DISCHARGE)    Surgeons: Yaima Mcfadden MD Responsible Provider: REBEKAH Zavala CRNA    Anesthesia Type: general, TIVA ASA Status: 3          Anesthesia Type: No value filed. Yadi Phase I:      Yadi Phase II:      Last vitals: Reviewed and per EMR flowsheets.        Anesthesia Post Evaluation    Patient location during evaluation: bedside  Patient participation: complete - patient participated  Level of consciousness: sleepy but conscious  Pain score: 0  Airway patency: patent  Nausea & Vomiting: no nausea and no vomiting  Complications: no  Cardiovascular status: blood pressure returned to baseline  Respiratory status: acceptable, room air and spontaneous ventilation  Hydration status: euvolemic

## 2022-05-16 NOTE — H&P
Patient Name: Geovany Delaney  : 1945  MRN: [id number]  DATE: 22    Allergies: Allergies   Allergen Reactions    Flomax [Tamsulosin] Other (See Comments)     HOSPITALIZED FOR HYPOTENSION    Levofloxacin Other (See Comments)     Pt cant remember reaction    Pcn [Penicillins] Rash     HAVEN'T TAKEN SINCE CHILDHOOD    Sulfa Antibiotics Rash    Vioxx Diarrhea        ENDOSCOPY  History and Physical    Procedure:    [] Diagnostic Colonoscopy       [] Screening Colonoscopy  [x] EGD      [] ERCP      [] EUS       [] Other    [x] Previous office notes/History and Physical reviewed from the patients chart. Please see EMR for further details of HPI. I have examined the patient's status immediately prior to the procedure and:      Indications/HPI:    []Abdominal Pain   []Barretts  []Screening/Surveillance   []History of Polyps  []Dysphagia            [] +Cologard/DNA testing  []Abnormal Imaging              []EOE Hx              [] Family Hx of CRC/Polyps  []Anemia                            []Food Impaction       []Recent Poor Prep  []GI Bleed             []Lymphadenopathy  []History of Polyps  []Change in bowel habits []Heartburn/Reflux  []Cancer- GI/Lung  []Chest Pain - Non Cardiac []Heme (+) Stool []Ulcers  []Constipation  []Hemoptysis  []Incontinence    []Diarrhea  []Hypoxemia  [x]Rectal Bleed (BRBPR)  [x]Nausea/Vomiting   [] Varices  []Crohns/Colitis  []Pancreatic Cyst   [] Cirrhosis   []Pancreatitis    []Abnormal MRCP  []Elevated LFT [] Stent Removal, Previous ERCP  []Other:     Anesthesia:   [x] MAC [] Moderate Sedation   [] General   [] None     ROS: 12 pt Review of Symptoms was negative unless mentioned above    Medications:   Prior to Admission medications    Medication Sig Start Date End Date Taking?  Authorizing Provider   levothyroxine (SYNTHROID) 100 MCG tablet Take 100 mcg by mouth Daily    Historical Provider, MD   Calcium-Vitamin D-Vitamin K (VIACTIV CALCIUM PLUS D) 757-48.8-55 MG-MCG-MCG CHEW Take 1 tablet by mouth 2 times daily     Historical Provider, MD   silodosin (RAPAFLO) 4 MG CAPS capsule Take 1 capsule by mouth every evening 12/20/21   Bart yT, APRN - CNP   WAL-ZYR D 5-120 MG per extended release tablet Take 1 tablet by mouth nightly  8/30/21   Historical Provider, MD   celecoxib (CELEBREX) 100 MG capsule Take 100 mg by mouth 2 times daily  8/17/21   Historical Provider, MD   Cranberry 500 MG TABS Take 1 tablet by mouth daily    Historical Provider, MD   fluticasone (FLONASE) 50 MCG/ACT nasal spray 2 sprays by Nasal route daily as needed    Historical Provider, MD   gabapentin (NEURONTIN) 100 MG capsule Take 100 mg by mouth nightly. 6/25/21   Historical Provider, MD   magnesium (MAGNESIUM-OXIDE) 250 MG TABS tablet Take 500 mg by mouth daily    Historical Provider, MD   omeprazole (PRILOSEC OTC) 20 MG tablet Take 20 mg by mouth daily     Historical Provider, MD   LORazepam (ATIVAN) 1 MG tablet Take 2 mg by mouth nightly.      Historical Provider, MD   losartan (COZAAR) 50 MG tablet Take 100 mg by mouth daily     Historical Provider, MD   Multiple Vitamin (MULTIVITAMIN PO) Take 1 tablet by mouth daily     Historical Provider, MD       Past Medical History:  Past Medical History:   Diagnosis Date    Allergic rhinitis     Hypertension     Hypothyroidism     Kidney stones     Osteoarthritis        Past Surgical History:  Past Surgical History:   Procedure Laterality Date    CHOLECYSTECTOMY      COLONOSCOPY  04/16/2009    Dr Dale Jameson sigmoid diverticula, AP, 2 yr recall    COLONOSCOPY  03/08/2012    Dr Bushra Fitzgerald, 5 yr recall    COLONOSCOPY  06/07/2019    Dr Elzie Olszewski small mouthed diverticula in the sigmoid and descending colon, 5 yr recall    COLONOSCOPY  03/01/2006    Dr Dale Jameson diverticulosis, HP, 3 yr recall    HERNIA REPAIR      SD COLONOSCOPY FLX DX W/COLLJ SPEC WHEN PFRMD N/A 04/13/2017    Dr Mio Sanders, 5 yr recall   Leticia Hunt TONSILLECTOMY      TOTAL KNEE ARTHROPLASTY Right 2022    RIGHT TOTAL KNEE ARTHROPLASTY performed by Lindajean Denver, MD at [facility] [address] [facility] ENDOSCOPY  2019    Dr Gill Nose, gastritis, urease (+)       Social History:  Social History     Tobacco Use    Smoking status: Former Smoker     Years: 30.00     Types: Cigarettes     Quit date:      Years since quittin.3    Smokeless tobacco: Never Used   Vaping Use    Vaping Use: Never used   Substance Use Topics    Alcohol use: No    Drug use: No       Vital Signs: There were no vitals filed for this visit. Physical Exam:  Cardiac:  [x]WNL  []Comments:  Pulmonary:  [x]WNL   []Comments:  Neuro/Mental Status:  [x]WNL  []Comments:  Abdominal:  [x]WNL    []Comments:  Other:   []WNL  []Comments:    Informed Consent:  The risks and benefits of the procedure have been discussed with either the patient or if they cannot consent, their representative. Assessment:  Patient examined and appropriate for planned sedation and procedure. Plan:  Proceed with planned sedation and procedure as above. Genevieve Rutledge am scribing for and in the presence of Dr. Aditya Marcus MD.  Electronically signed by Johan Corbin RN on 2022 at 10:58 AM    I personally performed the services described in this documentation as scribed by Charli Farris, and it appears accurate and complete.      Aditya Marcus MD  2022

## 2022-06-16 NOTE — RESULT ENCOUNTER NOTE
I think rectal bleeding is likely from this prolapsed/inflamed tissue more than anything. She needs to keep her stools soft. If still not improved a trial of steroid suppositories may help control the inflammation. This can/should be discussed at her f/u appt.

## 2022-06-17 ENCOUNTER — TELEPHONE (OUTPATIENT)
Dept: GASTROENTEROLOGY | Age: 77
End: 2022-06-17

## 2022-06-17 NOTE — TELEPHONE ENCOUNTER
Pt was not at home when I called to give results. I did go over them with her  her his request. He is on her communication release also. They will keep her apt next week with [address] [place] [address] Drive APRN.    ----- Message from Michelle Wallis MD sent at 6/16/2022  4:12 PM CDT -----  I think rectal bleeding is likely from this prolapsed/inflamed tissue more than anything. She needs to keep her stools soft. If still not improved a trial of steroid suppositories may help control the inflammation. This can/should be discussed at her f/u appt.

## 2022-06-20 NOTE — PROGRESS NOTES
Mayi Burgos is a 78 y.o. female who presents today   Chief Complaint   Patient presents with    Follow-up     I am here today for my 6 mo follow up for incompelete bladder emptying       Patient is a 62-year-old female presents clinic today for follow-up. She is a previous patient of Dr. Freddy Lutz and was transferred to female urology in [place]. She also has a history of a bilateral ESWL in 2019. Most recent CT obtained in 2021 is completely unremarkable, no nonobstructing nor obstructing stones identified. She has a history of recurrent UTIs and was was on Premarin although stopped this due to cost she has not had any follow-up recurrent UTIs. She does have a history of a bladder surgery approximately 3 years ago and since then has had worsening lower urinary tract symptoms. At last visit she has complaints of incomplete bladder emptying and straining. Unable to tolerate Flomax due to hypotension. She is currently maintained on Rapaflo. Her bladder scans have been intermittent with the very first 1 being 94 mL. After starting Rapaflo this was 0 mL. Today her bladder scan reveals to 222 mL. She denies any constipation. She does tell me that she left a stable but did not attempt to empty her bladder. She reports this usually takes around 30 seconds to completely empty her bladder. She does feel the urge to go at this time. She denies any flank pain, fever, chills.     Past Medical History:   Diagnosis Date    Allergic rhinitis     Hypertension     Hypothyroidism     Kidney stones     Osteoarthritis        Past Surgical History:   Procedure Laterality Date    CHOLECYSTECTOMY      COLONOSCOPY  2009    Dr Renetta Llanes sigmoid diverticula, AP, 2 yr recall    COLONOSCOPY  2012    Dr Vance Ragsdale, 5 yr recall    COLONOSCOPY  2019    Dr Dina Macdonadl small mouthed diverticula in the sigmoid and descending colon, 5 yr recall    COLONOSCOPY  2006 Dr Jessica Tanner diverticulosis, HP, 3 yr recall    COLONOSCOPY N/A 05/16/2022    Dr Idris Haley-Patchy area of erythema in the rectum, questionable proctitis vs prolapsed mucosa, diverticular disease-Active colitis, HP    HERNIA REPAIR      NM COLONOSCOPY FLX DX W/COLLJ SPEC WHEN PFRMD N/A 04/13/2017    Dr Kemal Martinez, 5 yr recall    TONSILLECTOMY      TOTAL KNEE ARTHROPLASTY Right 02/02/2022    RIGHT TOTAL KNEE ARTHROPLASTY performed by Nicolas Kim MD at [address] [place] [address] ENDOSCOPY  05/30/2019    Dr Neel Jackson, gastritis, urease (+)    UPPER GASTROINTESTINAL ENDOSCOPY N/A 05/16/2022    Dr Idris Haley-Linear gastritis, no h pylori       Current Outpatient Medications   Medication Sig Dispense Refill    amitriptyline (ELAVIL) 25 MG tablet TAKE 1 TABLET BY MOUTH AT BEDTIME      silodosin (RAPAFLO) 4 MG CAPS capsule Take 1 capsule by mouth every evening 30 capsule 11    cyclobenzaprine (FLEXERIL) 5 MG tablet Take 5 mg by mouth nightly as needed for Muscle spasms      omeprazole (PRILOSEC) 40 MG delayed release capsule Take 1 capsule by mouth 2 times daily (before meals) 90 capsule 3    levothyroxine (SYNTHROID) 100 MCG tablet Take 100 mcg by mouth Daily      Calcium-Vitamin D-Vitamin K (VIACTIV CALCIUM PLUS D) 650-12.5-40 MG-MCG-MCG CHEW Take 1 tablet by mouth 2 times daily       WAL-ZYR D 5-120 MG per extended release tablet Take 1 tablet by mouth nightly       celecoxib (CELEBREX) 100 MG capsule Take 100 mg by mouth 2 times daily       Cranberry 500 MG TABS Take 1 tablet by mouth daily      fluticasone (FLONASE) 50 MCG/ACT nasal spray 2 sprays by Nasal route daily as needed      gabapentin (NEURONTIN) 100 MG capsule Take 100 mg by mouth nightly.  magnesium (MAGNESIUM-OXIDE) 250 MG TABS tablet Take 500 mg by mouth daily      LORazepam (ATIVAN) 1 MG tablet Take 2 mg by mouth nightly.        losartan (COZAAR) 50 MG tablet Take 100 mg by mouth daily       Multiple Vitamin (MULTIVITAMIN PO) Take 1 tablet by mouth daily        No current facility-administered medications for this visit. Allergies   Allergen Reactions    Flomax [Tamsulosin] Other (See Comments)     HOSPITALIZED FOR HYPOTENSION    Other     Levofloxacin Other (See Comments)     Pt cant remember reaction    Pcn [Penicillins] Rash     HAVEN'T TAKEN SINCE CHILDHOOD    Sulfa Antibiotics Rash    Vioxx Diarrhea       Social History     Socioeconomic History    Marital status:      Spouse name: None    Number of children: None    Years of education: None    Highest education level: None   Occupational History    None   Tobacco Use    Smoking status: Former Smoker     Years: 30.00     Types: Cigarettes     Quit date:      Years since quittin.4    Smokeless tobacco: Never Used   Vaping Use    Vaping Use: Never used   Substance and Sexual Activity    Alcohol use: No    Drug use: No    Sexual activity: None   Other Topics Concern    None   Social History Narrative    None     Social Determinants of Health     Financial Resource Strain:     Difficulty of Paying Living Expenses: Not on file   Food Insecurity:     Worried About Running Out of Food in the Last Year: Not on file    Zabrina of Food in the Last Year: Not on file   Transportation Needs:     Lack of Transportation (Medical): Not on file    Lack of Transportation (Non-Medical):  Not on file   Physical Activity:     Days of Exercise per Week: Not on file    Minutes of Exercise per Session: Not on file   Stress:     Feeling of Stress : Not on file   Social Connections:     Frequency of Communication with Friends and Family: Not on file    Frequency of Social Gatherings with Friends and Family: Not on file    Attends Judaism Services: Not on file    Active Member of Clubs or Organizations: Not on file    Attends Club or Organization Meetings: Not on file    Marital Status: Not on file Intimate Partner Violence:     Fear of Current or Ex-Partner: Not on file    Emotionally Abused: Not on file    Physically Abused: Not on file    Sexually Abused: Not on file   Housing Stability:     Unable to Pay for Housing in the Last Year: Not on file    Number of [place] in the Last Year: Not on file    Unstable Housing in the Last Year: Not on file       Family History   Problem Relation Age of Onset    Colon Polyps Mother     Heart Attack Father 62    Colon Cancer Neg Hx     Esophageal Cancer Neg Hx     Liver Cancer Neg Hx     Liver Disease Neg Hx     Rectal Cancer Neg Hx     Stomach Cancer Neg Hx        REVIEW OF SYSTEMS:  Review of Systems   Constitutional: Negative for chills and fever. Gastrointestinal: Negative for abdominal distention, abdominal pain, nausea and vomiting. Genitourinary: Positive for difficulty urinating. Negative for dysuria, flank pain, frequency, hematuria and urgency. Musculoskeletal: Negative for back pain and gait problem. Psychiatric/Behavioral: Negative for agitation and confusion. PHYSICAL EXAM:  /65   Temp 97.2 °F (36.2 °C) (Temporal)   Ht 5' 3\" (1.6 m)   Wt 142 lb (64.4 kg)   BMI 25.15 kg/m²   Physical Exam  Vitals and nursing note reviewed. Constitutional:       General: She is not in acute distress. Appearance: Normal appearance. She is not ill-appearing. Pulmonary:      Effort: Pulmonary effort is normal. No respiratory distress. Abdominal:      General: There is no distension. Tenderness: There is no abdominal tenderness. There is no right CVA tenderness or left CVA tenderness. Neurological:      Mental Status: She is alert and oriented to person, place, and time. Mental status is at baseline.    Psychiatric:         Mood and Affect: Mood normal.         Behavior: Behavior normal.       DATA:    Results for orders placed or performed in visit on 06/21/22   POCT Urinalysis no Micro   Result Value Ref Range Color, UA YELLOW     Clarity, UA CLEAR     Glucose, UA POC NEG     Bilirubin, UA NEG     Ketones, UA NEG     Spec Grav, UA 1.010     Blood, UA POC TRACE     pH, UA 6.5     Protein, UA POC NEG     Urobilinogen, UA 0.2     Leukocytes, UA NEG     Nitrite, UA NEG     Appearance, Fluid Clear Clear, Slightly Cloudy       IMAGING:  Bladder Scan interpretation  Estimation of residual urine via abdominal ultrasound  Residual Urine: 222 ml  Indication: incomplete bladder emptying   Position: Supine  Examination: Incremental scanning of the suprapubic area using 3 MHz transducer using copious amounts of acoustic gel. Findings: An anechoic area was demonstrated which represented the bladder, with measurement of residual urine as noted. 1. Incomplete bladder emptying  Patient does have some incomplete bladder emptying today with 222 mL PVR left in her bladder although she states she did not attempt to empty her bladder just give a specimen today. At this time we will go ahead and continue Rapaflo. We will have follow-up in 6 months. Will need bladder scan. - DE Measure, post-void residual, US, non-imaging  - silodosin (RAPAFLO) 4 MG CAPS capsule; Take 1 capsule by mouth every evening  Dispense: 30 capsule; Refill: 11  - POCT Urinalysis no Micro    2. History of kidney stones  History of kidney stones. Previous CT obtained in September 2021 was negative for any nonobstructing or obstructing stones. She is currently asymptomatic denies any flank pain. We will continue to monitor. 3. Recurrent UTI  4. Atrophic vaginitis  History of recurrent UTIs and atrophic vaginitis currently no longer on Premarin due to cost.  Denies any recurrent UTIs at this time. We will continue to monitor. Orders Placed This Encounter   Procedures    POCT Urinalysis no Micro    DE Measure, post-void residual, US, non-imaging        Return in about 6 months (around 12/21/2022).     All information inputted into the note by the MA to include chief complaint, past medical history, past surgical history, medications, allergies, social and family history and review of systems has been reviewed and updated as needed by me. EMR Dragon/transcription disclaimer: Much of this documentt is electronic  transcription/translation of spoken language to printed text. The  electronic translation of spoken language may be erroneous, or at times,  nonsensical words or phrases may be inadvertently transcribed.  Although I  have reviewed the document for such errors, some may still exist.

## 2022-06-21 ENCOUNTER — OFFICE VISIT (OUTPATIENT)
Dept: UROLOGY | Age: 77
End: 2022-06-21
Payer: MEDICARE

## 2022-06-21 VITALS
DIASTOLIC BLOOD PRESSURE: 65 MMHG | TEMPERATURE: 97.2 F | SYSTOLIC BLOOD PRESSURE: 123 MMHG | BODY MASS INDEX: 25.16 KG/M2 | WEIGHT: 142 LBS | HEIGHT: 63 IN

## 2022-06-21 DIAGNOSIS — N39.0 RECURRENT UTI: ICD-10-CM

## 2022-06-21 DIAGNOSIS — Z87.442 HISTORY OF KIDNEY STONES: ICD-10-CM

## 2022-06-21 DIAGNOSIS — R33.9 INCOMPLETE BLADDER EMPTYING: Primary | ICD-10-CM

## 2022-06-21 DIAGNOSIS — N95.2 ATROPHIC VAGINITIS: ICD-10-CM

## 2022-06-21 LAB
APPEARANCE FLUID: CLEAR
BILIRUBIN, POC: NORMAL
BLOOD URINE, POC: NORMAL
CLARITY, POC: CLEAR
COLOR, POC: YELLOW
GLUCOSE URINE, POC: NORMAL
KETONES, POC: NORMAL
LEUKOCYTE EST, POC: NORMAL
NITRITE, POC: NORMAL
PH, POC: 6.5
PROTEIN, POC: NORMAL
SPECIFIC GRAVITY, POC: 1.01
UROBILINOGEN, POC: 0.2

## 2022-06-21 PROCEDURE — 51798 US URINE CAPACITY MEASURE: CPT | Performed by: NURSE PRACTITIONER

## 2022-06-21 PROCEDURE — G8417 CALC BMI ABV UP PARAM F/U: HCPCS | Performed by: NURSE PRACTITIONER

## 2022-06-21 PROCEDURE — 99214 OFFICE O/P EST MOD 30 MIN: CPT | Performed by: NURSE PRACTITIONER

## 2022-06-21 PROCEDURE — G8427 DOCREV CUR MEDS BY ELIG CLIN: HCPCS | Performed by: NURSE PRACTITIONER

## 2022-06-21 PROCEDURE — 81002 URINALYSIS NONAUTO W/O SCOPE: CPT | Performed by: NURSE PRACTITIONER

## 2022-06-21 PROCEDURE — 3017F COLORECTAL CA SCREEN DOC REV: CPT | Performed by: NURSE PRACTITIONER

## 2022-06-21 PROCEDURE — 1123F ACP DISCUSS/DSCN MKR DOCD: CPT | Performed by: NURSE PRACTITIONER

## 2022-06-21 PROCEDURE — 1090F PRES/ABSN URINE INCON ASSESS: CPT | Performed by: NURSE PRACTITIONER

## 2022-06-21 PROCEDURE — 1036F TOBACCO NON-USER: CPT | Performed by: NURSE PRACTITIONER

## 2022-06-21 PROCEDURE — G8399 PT W/DXA RESULTS DOCUMENT: HCPCS | Performed by: NURSE PRACTITIONER

## 2022-06-21 RX ORDER — SILODOSIN 4 MG/1
4 CAPSULE ORAL EVERY EVENING
Qty: 30 CAPSULE | Refills: 11 | Status: SHIPPED | OUTPATIENT
Start: 2022-06-21 | End: 2022-07-28 | Stop reason: SDUPTHER

## 2022-06-21 RX ORDER — AMITRIPTYLINE HYDROCHLORIDE 25 MG/1
TABLET, FILM COATED ORAL
COMMUNITY
Start: 2022-06-06

## 2022-06-21 ASSESSMENT — ENCOUNTER SYMPTOMS
VOMITING: 0
ABDOMINAL PAIN: 0
NAUSEA: 0
BACK PAIN: 0
ABDOMINAL DISTENTION: 0

## 2022-06-22 ENCOUNTER — TELEPHONE (OUTPATIENT)
Dept: GASTROENTEROLOGY | Age: 77
End: 2022-06-22

## 2022-06-22 ENCOUNTER — OFFICE VISIT (OUTPATIENT)
Dept: GASTROENTEROLOGY | Age: 77
End: 2022-06-22
Payer: MEDICARE

## 2022-06-22 VITALS
DIASTOLIC BLOOD PRESSURE: 70 MMHG | HEART RATE: 86 BPM | OXYGEN SATURATION: 98 % | BODY MASS INDEX: 24.8 KG/M2 | SYSTOLIC BLOOD PRESSURE: 115 MMHG | HEIGHT: 63 IN | WEIGHT: 140 LBS

## 2022-06-22 DIAGNOSIS — K62.9 RECTAL ABNORMALITY: Primary | ICD-10-CM

## 2022-06-22 PROCEDURE — 1123F ACP DISCUSS/DSCN MKR DOCD: CPT | Performed by: NURSE PRACTITIONER

## 2022-06-22 PROCEDURE — 1036F TOBACCO NON-USER: CPT | Performed by: NURSE PRACTITIONER

## 2022-06-22 PROCEDURE — 1090F PRES/ABSN URINE INCON ASSESS: CPT | Performed by: NURSE PRACTITIONER

## 2022-06-22 PROCEDURE — G8420 CALC BMI NORM PARAMETERS: HCPCS | Performed by: NURSE PRACTITIONER

## 2022-06-22 PROCEDURE — 99213 OFFICE O/P EST LOW 20 MIN: CPT | Performed by: NURSE PRACTITIONER

## 2022-06-22 PROCEDURE — G8427 DOCREV CUR MEDS BY ELIG CLIN: HCPCS | Performed by: NURSE PRACTITIONER

## 2022-06-22 PROCEDURE — G8399 PT W/DXA RESULTS DOCUMENT: HCPCS | Performed by: NURSE PRACTITIONER

## 2022-06-22 PROCEDURE — 3017F COLORECTAL CA SCREEN DOC REV: CPT | Performed by: NURSE PRACTITIONER

## 2022-06-22 ASSESSMENT — ENCOUNTER SYMPTOMS
RECTAL PAIN: 1
ABDOMINAL DISTENTION: 0
SORE THROAT: 0
SHORTNESS OF BREATH: 0
BACK PAIN: 0
ANAL BLEEDING: 1
BLOOD IN STOOL: 0
VOMITING: 0
TROUBLE SWALLOWING: 0
VOICE CHANGE: 0
NAUSEA: 0
CONSTIPATION: 0
DIARRHEA: 0
ABDOMINAL PAIN: 0
COUGH: 0

## 2022-06-22 NOTE — TELEPHONE ENCOUNTER
Please call the following RX in for the pt.   Anusol-HC suppository  Insert 1 per rectum at bedtime for 5 consecutive nights during hemorrhoidal flare  Dispense #20 with 3 refills

## 2022-06-22 NOTE — PROGRESS NOTES
Subjective:      Kenneth Farfan is a78 y.o. female  Chief Complaint   Patient presents with    Follow-up       HPI  PCP: Liam Hudson MD  Referring Provider: Ricardo Dimas MD  Pt made an appt s/p EGD and colonoscopy   Results of scopes in epic  We reviewed everything    She was prescribed PPI BID for 3 months by Dr Jenna Sloan with recommendations to decrease to once daily dosing thereafter. She has done this   And has no further UGI complaints    In regards to her previous c/o a \"ball\" that protrudes from rectum that is painful with rectal bleeding and discharge, this is still occurring. She has used preparation H in the past and it worked great but hasnt used it lately. This area was biopsied and the following note per Dr Jenna Sloan on the path result:  I think rectal bleeding is likely from this prolapsed/inflamed tissue more than anything. She needs to keep her stools soft. If still not improved a trial of steroid suppositories may help control the inflammation. This can/should be discussed at her f/u appt. Family HX:    Pt denies family hx of colon polyps, colon CA, inflammatory bowel dx, gastric CA and esophageal CA.     Past Medical History:   Diagnosis Date    Allergic rhinitis     Hypertension     Hypothyroidism     Kidney stones     Osteoarthritis           Past Surgical History:   Procedure Laterality Date    CHOLECYSTECTOMY      COLONOSCOPY  04/16/2009    Dr Landy Black sigmoid diverticula, AP, 2 yr recall    COLONOSCOPY  03/08/2012    Dr Daniel Britt, 5 yr recall    COLONOSCOPY  06/07/2019    Dr Vargas Edgar small mouthed diverticula in the sigmoid and descending colon, 5 yr recall    COLONOSCOPY  03/01/2006    Dr Landy Black diverticulosis, HP, 3 yr recall    COLONOSCOPY N/A 05/16/2022    Dr Chin Body- polyp, left tics, erythema in rectum r/t prolapsed inflamed tissue (RX hydrocortisone)    HERNIA REPAIR      [place] COLONOSCOPY FLX DX W/COLLJ SPEC WHEN PFRMD N/A 04/13/2017 Dr Daina Engel, 5 yr recall    TONSILLECTOMY      TOTAL KNEE ARTHROPLASTY Right 2022    RIGHT TOTAL KNEE ARTHROPLASTY performed by Sayra Estevez MD at [facility] ENDOSCOPY  2019    Dr Xiang Salvador, gastritis, urease (+)    UPPER GASTROINTESTINAL ENDOSCOPY N/A 2022    Dr Mcclain Sera gasritis, NEG h pylori       Social History     Socioeconomic History    Marital status:      Spouse name: None    Number of children: None    Years of education: None    Highest education level: None   Occupational History    None   Tobacco Use    Smoking status: Former Smoker     Years: 30.00     Types: Cigarettes     Quit date:      Years since quittin.4    Smokeless tobacco: Never Used   Vaping Use    Vaping Use: Never used   Substance and Sexual Activity    Alcohol use: No    Drug use: No    Sexual activity: None   Other Topics Concern    None   Social History Narrative    None     Social Determinants of Health     Financial Resource Strain:     Difficulty of Paying Living Expenses: Not on file   Food Insecurity:     Worried About Running Out of Food in the Last Year: Not on file    Zabrina of Food in the Last Year: Not on file   Transportation Needs:     Lack of Transportation (Medical): Not on file    Lack of Transportation (Non-Medical):  Not on file   Physical Activity:     Days of Exercise per Week: Not on file    Minutes of Exercise per Session: Not on file   Stress:     Feeling of Stress : Not on file   Social Connections:     Frequency of Communication with Friends and Family: Not on file    Frequency of Social Gatherings with Friends and Family: Not on file    Attends Protestant Services: Not on file    Active Member of Clubs or Organizations: Not on file    Attends Club or Organization Meetings: Not on file    Marital Status: Not on file   Intimate Partner Violence:     Fear of Current or Ex-Partner: Not on file    Emotionally Abused: Not on file    Physically Abused: Not on file    Sexually Abused: Not on file   Housing Stability:     Unable to Pay for Housing in the Last Year: Not on file    Number of Places Lived in the Last Year: Not on file    Unstable Housing in the Last Year: Not on file       Allergies   Allergen Reactions    Flomax [Tamsulosin] Other (See Comments)     HOSPITALIZED FOR HYPOTENSION    Other     Levofloxacin Other (See Comments)     Pt cant remember reaction    Pcn [Penicillins] Rash     HAVEN'T TAKEN SINCE CHILDHOOD    Sulfa Antibiotics Rash    Vioxx Diarrhea       Current Outpatient Medications   Medication Sig Dispense Refill    amitriptyline (ELAVIL) 25 MG tablet TAKE 1 TABLET BY MOUTH AT BEDTIME      silodosin (RAPAFLO) 4 MG CAPS capsule Take 1 capsule by mouth every evening 30 capsule 11    omeprazole (PRILOSEC) 40 MG delayed release capsule Take 1 capsule by mouth 2 times daily (before meals) 90 capsule 3    levothyroxine (SYNTHROID) 100 MCG tablet Take 100 mcg by mouth Daily      Calcium-Vitamin D-Vitamin K (VIACTIV CALCIUM PLUS D) 650-12.5-40 MG-MCG-MCG CHEW Take 1 tablet by mouth 2 times daily       WAL-ZYR D 5-120 MG per extended release tablet Take 1 tablet by mouth nightly       celecoxib (CELEBREX) 100 MG capsule Take 100 mg by mouth 2 times daily       Cranberry 500 MG TABS Take 1 tablet by mouth daily      fluticasone (FLONASE) 50 MCG/ACT nasal spray 2 sprays by Nasal route daily as needed      gabapentin (NEURONTIN) 100 MG capsule Take 100 mg by mouth nightly.  magnesium (MAGNESIUM-OXIDE) 250 MG TABS tablet Take 500 mg by mouth daily      LORazepam (ATIVAN) 1 MG tablet Take 2 mg by mouth nightly.  losartan (COZAAR) 50 MG tablet Take 100 mg by mouth daily       Multiple Vitamin (MULTIVITAMIN PO) Take 1 tablet by mouth daily        No current facility-administered medications for this visit.        Review of Systems   Constitutional: Negative for appetite change, fatigue, fever and unexpected weight change. HENT: Negative for sore throat, trouble swallowing and voice change. Respiratory: Negative for cough and shortness of breath. Cardiovascular: Negative for chest pain, palpitations and leg swelling. Gastrointestinal: Positive for anal bleeding and rectal pain. Negative for abdominal distention, abdominal pain, blood in stool, constipation, diarrhea, nausea and vomiting. Genitourinary: Negative for hematuria. Musculoskeletal: Negative for arthralgias, back pain and neck pain. Neurological: Negative for dizziness, weakness, light-headedness and headaches. Psychiatric/Behavioral: Negative for dysphoric mood and sleep disturbance. The patient is not nervous/anxious. All other systems reviewed and are negative. Objective:     Physical Exam  Vitals and nursing note reviewed. Constitutional:       Appearance: She is well-developed. Comments: /70 (Site: Left Upper Arm)   Pulse 86   Ht 5' 3\" (1.6 m)   Wt 140 lb (63.5 kg)   SpO2 98%   BMI 24.80 kg/m²    Eyes:      General: No scleral icterus. Conjunctiva/sclera: Conjunctivae normal.      Pupils: Pupils are equal, round, and reactive to light. Cardiovascular:      Rate and Rhythm: Normal rate and regular rhythm. Heart sounds: Normal heart sounds. No murmur heard. No friction rub. No gallop. Pulmonary:      Effort: Pulmonary effort is normal. No respiratory distress. Breath sounds: Normal breath sounds. Abdominal:      General: Bowel sounds are normal. There is no distension. Palpations: Abdomen is soft. Tenderness: There is no abdominal tenderness. There is no rebound. Neurological:      Mental Status: She is alert and oriented to person, place, and time. Cranial Nerves: No cranial nerve deficit. Psychiatric:         Judgment: Judgment normal.           Assessment:       Diagnosis Orders   1.  Rectal abnormality           Plan: 1. Use preparation H prn. Ill also have the MA call in RX for anusol-HC suppositories per Dr Cassy Horvath recommendations.

## 2022-06-27 NOTE — TELEPHONE ENCOUNTER
6-27-22    I called [facility] regarding the Anusol HC Suppository that her insurance will not pay for and they are costly. I spoke to [facility] and we changed this Rx to [facility]. Cream 2.5 % with aditi tip, apply to rectal area Bid prn, # 1 30 gram tube x 1 refill. Patient notified and was very thankful.  Richie anguiano

## 2022-07-01 ENCOUNTER — HOSPITAL ENCOUNTER (OUTPATIENT)
Dept: MAMMOGRAPHY | Facility: HOSPITAL | Age: 77
Discharge: HOME | End: 2022-07-01

## 2022-07-01 ENCOUNTER — HOSPITAL ENCOUNTER (OUTPATIENT)
Dept: BONE DENSITY | Facility: HOSPITAL | Age: 77
Discharge: HOME | End: 2022-07-01

## 2022-07-01 DIAGNOSIS — Z78.0 POSTMENOPAUSAL STATUS: ICD-10-CM

## 2022-07-01 DIAGNOSIS — Z12.31 ENCOUNTER FOR SCREENING MAMMOGRAM FOR MALIGNANT NEOPLASM OF BREAST: ICD-10-CM

## 2022-07-01 PROCEDURE — 77080 DXA BONE DENSITY AXIAL: CPT

## 2022-07-01 PROCEDURE — 77067 SCR MAMMO BI INCL CAD: CPT

## 2022-07-01 PROCEDURE — 77063 BREAST TOMOSYNTHESIS BI: CPT

## 2022-07-05 ENCOUNTER — APPOINTMENT (OUTPATIENT)
Dept: GENERAL RADIOLOGY | Age: 77
End: 2022-07-05
Payer: MEDICARE

## 2022-07-05 ENCOUNTER — APPOINTMENT (OUTPATIENT)
Dept: CT IMAGING | Age: 77
End: 2022-07-05
Payer: MEDICARE

## 2022-07-05 ENCOUNTER — HOSPITAL ENCOUNTER (EMERGENCY)
Age: 77
Discharge: HOME | End: 2022-07-05
Payer: MEDICARE

## 2022-07-05 VITALS
OXYGEN SATURATION: 93 % | RESPIRATION RATE: 18 BRPM | HEART RATE: 86 BPM | SYSTOLIC BLOOD PRESSURE: 140 MMHG | DIASTOLIC BLOOD PRESSURE: 63 MMHG

## 2022-07-05 DIAGNOSIS — S20.212A RIB CONTUSION, LEFT, INITIAL ENCOUNTER: ICD-10-CM

## 2022-07-05 DIAGNOSIS — S70.02XA CONTUSION OF LEFT HIP, INITIAL ENCOUNTER: ICD-10-CM

## 2022-07-05 DIAGNOSIS — S16.1XXA STRAIN OF NECK MUSCLE, INITIAL ENCOUNTER: ICD-10-CM
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
## 2022-07-05 PROCEDURE — 99284 EMERGENCY DEPT VISIT MOD MDM: CPT

## 2022-07-05 PROCEDURE — 71101 X-RAY EXAM UNILAT RIBS/CHEST: CPT | Performed by: RADIOLOGY

## 2022-07-05 PROCEDURE — 72125 CT NECK SPINE W/O DYE: CPT

## 2022-07-05 PROCEDURE — 6370000000 HC RX 637 (ALT 250 FOR IP): Performed by: PHYSICIAN ASSISTANT

## 2022-07-05 PROCEDURE — 73502 X-RAY EXAM HIP UNI 2-3 VIEWS: CPT

## 2022-07-05 PROCEDURE — 73502 X-RAY EXAM HIP UNI 2-3 VIEWS: CPT | Performed by: RADIOLOGY

## 2022-07-05 PROCEDURE — 72125 CT NECK SPINE W/O DYE: CPT | Performed by: RADIOLOGY

## 2022-07-05 PROCEDURE — 71101 X-RAY EXAM UNILAT RIBS/CHEST: CPT

## 2022-07-05 RX ORDER — OXYCODONE HYDROCHLORIDE AND ACETAMINOPHEN 5; 325 MG/1; MG/1
1 TABLET ORAL ONCE
Status: COMPLETED | OUTPATIENT
Start: 2022-07-05 | End: 2022-07-05

## 2022-07-05 RX ADMIN — OXYCODONE HYDROCHLORIDE AND ACETAMINOPHEN 1 TABLET: 5; 325 TABLET ORAL at 20:26

## 2022-07-05 ASSESSMENT — ENCOUNTER SYMPTOMS
BACK PAIN: 0
ABDOMINAL DISTENTION: 0
VOMITING: 0
DIARRHEA: 0
PHOTOPHOBIA: 0
SHORTNESS OF BREATH: 0
ABDOMINAL PAIN: 0
NAUSEA: 0

## 2022-07-05 ASSESSMENT — PAIN SCALES - GENERAL: PAINLEVEL_OUTOF10: 7

## 2022-07-05 ASSESSMENT — PAIN SCALES - WONG BAKER: WONGBAKER_NUMERICALRESPONSE: 2

## 2022-07-05 NOTE — ED NOTES
Patient slipped and fell into ditch with twisting motion. Pain to left thigh above knee. Ankle and foot ROM intact. Passive left knee flex without pain. Active movement of left knee, hip, thigh produces pain to medial aspect of left thigh. Mild swelling noted above left knee. No obvious deformity.  Pedal pulses intact equal.     Azalea Dobson RN  07/05/22 [address], RN  07/05/22 6400

## 2022-07-06 DIAGNOSIS — R33.9 INCOMPLETE BLADDER EMPTYING: ICD-10-CM

## 2022-07-06 RX ORDER — SILODOSIN 4 MG/1
CAPSULE ORAL
Qty: 30 CAPSULE | Refills: 6 | OUTPATIENT
Start: 2022-07-06

## 2022-07-06 NOTE — ED PROVIDER NOTES
[facility] EMERGENCY DEPT  eMERGENCY dEPARTMENT eNCOUnter      Pt Name: Rigoberto Huynh  MRN: [id number]  [place] 1945  Date of evaluation: 7/5/2022  Provider: Cassandra Zambrano Dr       Chief Complaint   Patient presents with   Nonda  doing yard work, left sided groin to knee pain when attempting ambulation          HISTORY OF PRESENT ILLNESS   (Location/Symptom, Timing/Onset,Context/Setting, Quality, Duration, Modifying Factors, Severity)  Note limiting factors. Rigoberto Huynh is a 78 y.o. female with history of hypertension, hypothyroidism, and osteoarthritis who presents to the emergency department with complaint of a fall. The patient notes a fall doing yard work. She got off-balanced while doing yard work. She denies any dizziness, syncopal episode, or decrease in consciousness. She explained a mechanical fall. She is complaining of pain in the left hip that does radiate down into the groin and down the leg with movement. She denies bowel or bladder dysfunction, saddle anesthesia, or radiculopathy at rest. She denies any associated back, abdominal, or head pain. She denies any neck pain. She does note some anterior left chest wall pain. She denies any associated shortness of breath or chest pain with the chest wall pain. HPI    NursingNotes were reviewed. REVIEW OF SYSTEMS    (2-9 systems for level 4, 10 or more for level 5)     Review of Systems   Constitutional: Negative for chills and fever. Eyes: Negative for photophobia and visual disturbance. Respiratory: Negative for shortness of breath. Cardiovascular: Positive for chest pain (left chest wall). Gastrointestinal: Negative for abdominal distention, abdominal pain, diarrhea, nausea and vomiting. Genitourinary: Negative for dysuria, flank pain, frequency and hematuria. Musculoskeletal: Positive for arthralgias and gait problem. Negative for back pain and neck pain.    Neurological: Negative for dizziness, weakness, numbness and headaches. All other systems reviewed and are negative. PAST MEDICALHISTORY     Past Medical History:   Diagnosis Date    Allergic rhinitis     Hypertension     Hypothyroidism     Kidney stones     Osteoarthritis          SURGICAL HISTORY       Past Surgical History:   Procedure Laterality Date    CHOLECYSTECTOMY      COLONOSCOPY  04/16/2009    Dr Elizabeth Sabillon sigmoid diverticula, AP, 2 yr recall    COLONOSCOPY  03/08/2012    Dr Perico Kidd, 5 yr recall    COLONOSCOPY  06/07/2019    Dr Anthony Morales small mouthed diverticula in the sigmoid and descending colon, 5 yr recall    COLONOSCOPY  03/01/2006    Dr Elizabeth Sabillon diverticulosis, HP, 3 yr recall    COLONOSCOPY N/A 05/16/2022    Dr Néstor Momin- polyp, left tics, erythema in rectum r/t prolapsed inflamed tissue (RX hydrocortisone)    HERNIA REPAIR      [place] COLONOSCOPY FLX DX W/COLLJ SPEC WHEN PFRMD N/A 04/13/2017    Dr Emanuel Biggs, 5 yr recall    TONSILLECTOMY      TOTAL KNEE ARTHROPLASTY Right 02/02/2022    RIGHT TOTAL KNEE ARTHROPLASTY performed by Altagracia Rene MD at [facility] ENDOSCOPY  05/30/2019    Dr Ileana Segura, gastritis, urease (+)    UPPER GASTROINTESTINAL ENDOSCOPY N/A 05/16/2022    Dr Aliza Haley-mild gasritis, NEG h pylori         CURRENT MEDICATIONS     Previous Medications    AMITRIPTYLINE (ELAVIL) 25 MG TABLET    TAKE 1 TABLET BY MOUTH AT BEDTIME    CALCIUM-VITAMIN D-VITAMIN K (VIACTIV CALCIUM PLUS D) 650-12.5-40 MG-MCG-MCG CHEW    Take 1 tablet by mouth 2 times daily     CELECOXIB (CELEBREX) 100 MG CAPSULE    Take 100 mg by mouth 2 times daily     CRANBERRY 500 MG TABS    Take 1 tablet by mouth daily    FLUTICASONE (FLONASE) 50 MCG/ACT NASAL SPRAY    2 sprays by Nasal route daily as needed    GABAPENTIN (NEURONTIN) 100 MG CAPSULE    Take 100 mg by mouth nightly.      LEVOTHYROXINE (SYNTHROID) 100 MCG TABLET    Take 100 mcg by mouth Daily    LORAZEPAM (ATIVAN) 1 MG TABLET    Take 2 mg by mouth nightly. LOSARTAN (COZAAR) 50 MG TABLET    Take 100 mg by mouth daily     MAGNESIUM (MAGNESIUM-OXIDE) 250 MG TABS TABLET    Take 500 mg by mouth daily    MULTIPLE VITAMIN (MULTIVITAMIN PO)    Take 1 tablet by mouth daily     OMEPRAZOLE (PRILOSEC) 40 MG DELAYED RELEASE CAPSULE    Take 1 capsule by mouth 2 times daily (before meals)    SILODOSIN (RAPAFLO) 4 MG CAPS CAPSULE    Take 1 capsule by mouth every evening    WAL-ZYR D 5-120 MG PER EXTENDED RELEASE TABLET    Take 1 tablet by mouth nightly        ALLERGIES     Flomax [tamsulosin], Other, Levofloxacin, Pcn [penicillins], Sulfa antibiotics, and Vioxx    FAMILY HISTORY       Family History   Problem Relation Age of Onset    Colon Polyps Mother     Heart Attack Father 62    Colon Cancer Neg Hx     Esophageal Cancer Neg Hx     Liver Cancer Neg Hx     Liver Disease Neg Hx     Rectal Cancer Neg Hx     Stomach Cancer Neg Hx           SOCIAL HISTORY       Social History     Socioeconomic History    Marital status:      Spouse name: None    Number of children: None    Years of education: None    Highest education level: None   Occupational History    None   Tobacco Use    Smoking status: Former Smoker     Years: 30.00     Types: Cigarettes     Quit date:      Years since quittin.    Smokeless tobacco: Never Used   Vaping Use    Vaping Use: Never used   Substance and Sexual Activity    Alcohol use: No    Drug use: No    Sexual activity: None   Other Topics Concern    None   Social History Narrative    None     Social Determinants of Health     Financial Resource Strain:     Difficulty of Paying Living Expenses: Not on file   Food Insecurity:     Worried About Running Out of Food in the Last Year: Not on file    Zabrina of Food in the Last Year: Not on file   Transportation Needs:     Lack of Transportation (Medical):  Not on file    Lack of Transportation (Non-Medical): Not on file   Physical Activity:     Days of Exercise per Week: Not on file    Minutes of Exercise per Session: Not on file   Stress:     Feeling of Stress : Not on file   Social Connections:     Frequency of Communication with Friends and Family: Not on file    Frequency of Social Gatherings with Friends and Family: Not on file    Attends Islam Services: Not on file    Active Member of [address] [place] [address] or Organizations: Not on file    Attends Club or Organization Meetings: Not on file    Marital Status: Not on file   Intimate Partner Violence:     Fear of Current or Ex-Partner: Not on file    Emotionally Abused: Not on file    Physically Abused: Not on file    Sexually Abused: Not on file   Housing Stability:     Unable to Pay for Housing in the Last Year: Not on file    Number of [place] in the Last Year: Not on file    Unstable Housing in the Last Year: Not on file       SCREENINGS    Sully Coma Scale  Eye Opening: Spontaneous  Best Verbal Response: Oriented  Best Motor Response: Obeys commands  Sully Coma Scale Score: 15        PHYSICAL EXAM    (up to 7 for level 4, 8 or more for level 5)     ED Triage Vitals [07/05/22 1247]   BP Temp Temp Source Heart Rate Resp SpO2 Height Weight   114/65 -- Oral 79 18 98 % -- --       Physical Exam  Vitals and nursing note reviewed. Constitutional:       General: She is not in acute distress. Appearance: Normal appearance. She is normal weight. She is not ill-appearing, toxic-appearing or diaphoretic. HENT:      Head: Normocephalic and atraumatic. Cardiovascular:      Rate and Rhythm: Normal rate and regular rhythm. Pulses: Normal pulses. Pulmonary:      Effort: Pulmonary effort is normal. No respiratory distress. Comments: Anterior chest wall tenderness, reproducible  Chest:      Chest wall: Tenderness present. Abdominal:      General: There is no distension. Palpations: Abdomen is soft. Tenderness:  There is no abdominal tenderness. Musculoskeletal:      Cervical back: Normal range of motion and neck supple. No rigidity or tenderness. Right hip: No deformity, tenderness or bony tenderness. Normal range of motion. Normal strength. Left hip: Tenderness and bony tenderness present. No deformity. Decreased range of motion. Normal strength. Right upper leg: No swelling, deformity, tenderness or bony tenderness. Left upper leg: No swelling, deformity, tenderness or bony tenderness. Right knee: No swelling, deformity or bony tenderness. Normal range of motion. No tenderness. Normal alignment. Normal pulse. Left knee: No swelling, deformity or bony tenderness. Normal range of motion. No tenderness. Normal alignment. Normal pulse. Right lower leg: No swelling, deformity, tenderness or bony tenderness. Left lower leg: No swelling, deformity, tenderness or bony tenderness. Right ankle: No swelling or deformity. Normal range of motion. Normal pulse. Left ankle: No swelling or deformity. Normal range of motion. Normal pulse. Right foot: Normal range of motion. No swelling, deformity, tenderness or bony tenderness. Normal pulse. Left foot: Normal range of motion. No swelling, deformity, tenderness or bony tenderness. Normal pulse. Comments: Bilateral upper and lower extremities are negative for tenderness, decrease in ROM, swelling, or deformity except for the left hip. NV intact. Lymphadenopathy:      Cervical: No cervical adenopathy. Skin:     General: Skin is warm and dry. Neurological:      General: No focal deficit present. Mental Status: She is alert and oriented to person, place, and time.          DIAGNOSTIC RESULTS     RADIOLOGY:  Non-plain film images such as CT, Ultrasound and MRI are read by the radiologist. Plain radiographic images are visualized and preliminarily interpreted bythe emergency physician with the below findings:    XR HIP 2-3 VW W PELVIS LEFT   Final Result   Mild degenerative changes. No acute osseous abnormality. Recommendation: Follow up as clinically indicated. Electronically Signed by Abraham Connell MD at 05-Jul-2022 08:33:23 PM               XR RIBS LEFT INCLUDE CHEST (MIN 3 VIEWS)   Final Result   IMPRESSIONS: Osteopenia without acute fracture demonstrated. Recommendation: Follow up as clinically indicated. Note: Direct correlation with point tenderness and the X-ray images is recommended and does significantly increase the sensitivity of radiographic evaluation. Electronically Signed by Abraham Connell MD at 05-Jul-2022 08:32:50 PM               CT Cervical Spine WO Contrast   Final Result   1. Loss of cervical lordosis. 2. No acute fracture or subluxation. 3. Cervical spondylosis. 4. Multilevel degenerative changes at intervertebral, uncovertebral, facet joints. Recommendation: Follow up as clinically indicated. All CT scans at this facility utilize dose modulation, iterative reconstruction, and/or weight based dosing when appropriate to reduce radiation dose to as low as reasonably achievable. Electronically Signed by Abraham Connell MD at 05-Jul-2022 08:32:17 PM                 LABS:  Labs Reviewed - No data to display    All other labs were within normal range or not returned as of this dictation. EMERGENCY DEPARTMENT COURSE and DIFFERENTIAL DIAGNOSIS/MDM:   Vitals:    Vitals:    07/05/22 1730 07/05/22 1830 07/05/22 1924 07/05/22 1955   BP: (!) 151/71 (!) 163/77 (!) 157/65 (!) 140/63   Pulse:  81 84 86   Resp:  20 18 18   TempSrc:       SpO2: 92%  94% 93%       MDM  Patient is a 72-year-old female presents the ER after a fall that occurred earlier today while doing yard work. There was no associated syncopal episode, dizziness, headache, etc. that would have warranted further work-up at this time. She was positive Nexus C-spine so CT of the cervical spine was obtained.   This was negative for acute bony

## 2022-07-07 ENCOUNTER — TRANSCRIBE ORDERS (OUTPATIENT)
Dept: ADMINISTRATIVE | Facility: HOSPITAL | Age: 77
End: 2022-07-07

## 2022-07-07 ENCOUNTER — HOSPITAL ENCOUNTER (OUTPATIENT)
Dept: CT IMAGING | Facility: HOSPITAL | Age: 77
Discharge: HOME | End: 2022-07-07
Admitting: NURSE PRACTITIONER

## 2022-07-07 DIAGNOSIS — R10.2 PELVIC AND PERINEAL PAIN: ICD-10-CM

## 2022-07-07 DIAGNOSIS — M25.552 PAIN IN LEFT HIP: Primary | ICD-10-CM

## 2022-07-07 DIAGNOSIS — M79.605 PAIN IN LEFT LEG: ICD-10-CM

## 2022-07-07 PROCEDURE — 72192 CT PELVIS W/O DYE: CPT

## 2022-07-07 PROCEDURE — 73700 CT LOWER EXTREMITY W/O DYE: CPT

## 2022-07-21 ENCOUNTER — HOSPITAL ENCOUNTER (OUTPATIENT)
Dept: GENERAL RADIOLOGY | Facility: HOSPITAL | Age: 77
Discharge: HOME | End: 2022-07-21

## 2022-07-21 ENCOUNTER — TRANSCRIBE ORDERS (OUTPATIENT)
Dept: ADMINISTRATIVE | Facility: HOSPITAL | Age: 77
End: 2022-07-21
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
## 2022-07-21 DIAGNOSIS — M54.50 LOW BACK PAIN, UNSPECIFIED BACK PAIN LATERALITY, UNSPECIFIED CHRONICITY, UNSPECIFIED WHETHER SCIATICA PRESENT: ICD-10-CM
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
## 2022-07-21 PROCEDURE — 72070 X-RAY EXAM THORAC SPINE 2VWS: CPT

## 2022-07-21 PROCEDURE — 72110 X-RAY EXAM L-2 SPINE 4/>VWS: CPT

## 2022-07-28 ENCOUNTER — TELEPHONE (OUTPATIENT)
Dept: UROLOGY | Age: 77
End: 2022-07-28

## 2022-07-28 DIAGNOSIS — R33.9 INCOMPLETE BLADDER EMPTYING: ICD-10-CM

## 2022-07-28 RX ORDER — SILODOSIN 4 MG/1
4 CAPSULE ORAL EVERY EVENING
Qty: 30 CAPSULE | Refills: 11 | Status: SHIPPED | OUTPATIENT
Start: 2022-07-28

## 2022-07-28 NOTE — TELEPHONE ENCOUNTER
Pt called requesting silodosin 4 mg be sent to [facility] as it is too much at [facility]. Medication may need a prior auth.

## 2022-08-03 ENCOUNTER — TRANSCRIBE ORDERS (OUTPATIENT)
Dept: ADMINISTRATIVE | Facility: HOSPITAL | Age: 77
End: 2022-08-03

## 2022-08-03 DIAGNOSIS — M51.36 DEGENERATION OF LUMBAR INTERVERTEBRAL DISC: Primary | ICD-10-CM

## 2022-08-18 ENCOUNTER — HOSPITAL ENCOUNTER (OUTPATIENT)
Dept: MRI IMAGING | Facility: HOSPITAL | Age: 77
Discharge: HOME | End: 2022-08-18
Admitting: INTERNAL MEDICINE

## 2022-08-18 DIAGNOSIS — M51.36 DEGENERATION OF LUMBAR INTERVERTEBRAL DISC: ICD-10-CM

## 2022-08-18 PROCEDURE — 72148 MRI LUMBAR SPINE W/O DYE: CPT

## 2022-08-22 ENCOUNTER — TRANSCRIBE ORDERS (OUTPATIENT)
Dept: ADMINISTRATIVE | Facility: HOSPITAL | Age: 77
End: 2022-08-22

## 2022-08-22 DIAGNOSIS — R93.89 ABNORMAL FINDINGS ON IMAGING TEST: Primary | ICD-10-CM

## 2022-08-24 ENCOUNTER — HOSPITAL ENCOUNTER (OUTPATIENT)
Dept: ULTRASOUND IMAGING | Facility: HOSPITAL | Age: 77
Discharge: HOME | End: 2022-08-24
Admitting: NURSE PRACTITIONER

## 2022-08-24 DIAGNOSIS — R93.89 ABNORMAL FINDINGS ON IMAGING TEST: ICD-10-CM

## 2022-08-24 PROCEDURE — 76775 US EXAM ABDO BACK WALL LIM: CPT

## 2022-09-28 ENCOUNTER — OFFICE VISIT (OUTPATIENT)
Dept: NEUROSURGERY | Facility: CLINIC | Age: 77
End: 2022-09-28

## 2022-09-28 VITALS — HEIGHT: 62 IN | WEIGHT: 139 LBS | BODY MASS INDEX: 25.58 KG/M2

## 2022-09-28 DIAGNOSIS — S32.10XA CLOSED FRACTURE OF SACRUM, UNSPECIFIED PORTION OF SACRUM, INITIAL ENCOUNTER: Primary | ICD-10-CM

## 2022-09-28 DIAGNOSIS — E66.3 OVERWEIGHT WITH BODY MASS INDEX (BMI) OF 25 TO 25.9 IN ADULT: ICD-10-CM

## 2022-09-28 DIAGNOSIS — Z78.9 NON-SMOKER: ICD-10-CM

## 2022-09-28 PROCEDURE — 99213 OFFICE O/P EST LOW 20 MIN: CPT | Performed by: NURSE PRACTITIONER

## 2022-09-28 RX ORDER — AMITRIPTYLINE HYDROCHLORIDE 25 MG/1
25 TABLET, FILM COATED ORAL
COMMUNITY
Start: 2022-09-06

## 2022-09-28 RX ORDER — HYDROCODONE BITARTRATE AND ACETAMINOPHEN 10; 325 MG/1; MG/1
TABLET ORAL
COMMUNITY
Start: 2022-09-24

## 2022-09-28 NOTE — PATIENT INSTRUCTIONS
"DASH Eating Plan  DASH stands for Dietary Approaches to Stop Hypertension. The DASH eating plan is a healthy eating plan that has been shown to:  Reduce high blood pressure (hypertension).  Reduce your risk for type 2 diabetes, heart disease, and stroke.  Help with weight loss.  What are tips for following this plan?  Reading food labels  Check food labels for the amount of salt (sodium) per serving. Choose foods with less than 5 percent of the Daily Value of sodium. Generally, foods with less than 300 milligrams (mg) of sodium per serving fit into this eating plan.  To find whole grains, look for the word \"whole\" as the first word in the ingredient list.  Shopping  Buy products labeled as \"low-sodium\" or \"no salt added.\"  Buy fresh foods. Avoid canned foods and pre-made or frozen meals.  Cooking  Avoid adding salt when cooking. Use salt-free seasonings or herbs instead of table salt or sea salt. Check with your health care provider or pharmacist before using salt substitutes.  Do not mejia foods. Cook foods using healthy methods such as baking, boiling, grilling, roasting, and broiling instead.  Cook with heart-healthy oils, such as olive, canola, avocado, soybean, or sunflower oil.  Meal planning    Eat a balanced diet that includes:  4 or more servings of fruits and 4 or more servings of vegetables each day. Try to fill one-half of your plate with fruits and vegetables.  6-8 servings of whole grains each day.  Less than 6 oz (170 g) of lean meat, poultry, or fish each day. A 3-oz (85-g) serving of meat is about the same size as a deck of cards. One egg equals 1 oz (28 g).  2-3 servings of low-fat dairy each day. One serving is 1 cup (237 mL).  1 serving of nuts, seeds, or beans 5 times each week.  2-3 servings of heart-healthy fats. Healthy fats called omega-3 fatty acids are found in foods such as walnuts, flaxseeds, fortified milks, and eggs. These fats are also found in cold-water fish, such as sardines, salmon, " and mackerel.  Limit how much you eat of:  Canned or prepackaged foods.  Food that is high in trans fat, such as some fried foods.  Food that is high in saturated fat, such as fatty meat.  Desserts and other sweets, sugary drinks, and other foods with added sugar.  Full-fat dairy products.  Do not salt foods before eating.  Do not eat more than 4 egg yolks a week.  Try to eat at least 2 vegetarian meals a week.  Eat more home-cooked food and less restaurant, buffet, and fast food.  Lifestyle  When eating at a restaurant, ask that your food be prepared with less salt or no salt, if possible.  If you drink alcohol:  Limit how much you use to:  0-1 drink a day for women who are not pregnant.  0-2 drinks a day for men.  Be aware of how much alcohol is in your drink. In the [place] one drink equals one 12 oz bottle of beer (355 mL), one 5 oz glass of wine (148 mL), or one 1½ oz glass of hard liquor (44 mL).  General information  Avoid eating more than 2,300 mg of salt a day. If you have hypertension, you may need to reduce your sodium intake to 1,500 mg a day.  Work with your health care provider to maintain a healthy body weight or to lose weight. Ask what an ideal weight is for you.  Get at least 30 minutes of exercise that causes your heart to beat faster (aerobic exercise) most days of the week. Activities may include walking, swimming, or biking.  Work with your health care provider or dietitian to adjust your eating plan to your individual calorie needs.  What foods should I eat?  Fruits  All fresh, dried, or frozen fruit. Canned fruit in natural juice (without added sugar).  Vegetables  Fresh or frozen vegetables (raw, steamed, roasted, or grilled). Low-sodium or reduced-sodium tomato and vegetable juice. Low-sodium or reduced-sodium tomato sauce and tomato paste. Low-sodium or reduced-sodium canned vegetables.  Grains  Whole-grain or whole-wheat bread. Whole-grain or whole-wheat pasta. Brown rice. Oatmeal. Quinoa.  Bulgur. Whole-grain and low-sodium cereals. Amira bread. Low-fat, low-sodium crackers. Whole-wheat flour tortillas.  Meats and other proteins  Skinless chicken or turkey. Ground chicken or turkey. Pork with fat trimmed off. Fish and seafood. Egg whites. Dried beans, peas, or lentils. Unsalted nuts, nut butters, and seeds. Unsalted canned beans. Lean cuts of beef with fat trimmed off. Low-sodium, lean precooked or cured meat, such as sausages or meat loaves.  Dairy  Low-fat (1%) or fat-free (skim) milk. Reduced-fat, low-fat, or fat-free cheeses. Nonfat, low-sodium ricotta or cottage cheese. Low-fat or nonfat yogurt. Low-fat, low-sodium cheese.  Fats and oils  Soft margarine without trans fats. Vegetable oil. Reduced-fat, low-fat, or light mayonnaise and salad dressings (reduced-sodium). Canola, safflower, olive, avocado, soybean, and sunflower oils. Avocado.  Seasonings and condiments  Herbs. Spices. Seasoning mixes without salt.  Other foods  Unsalted popcorn and pretzels. Fat-free sweets.  The items listed above may not be a complete list of foods and beverages you can eat. Contact a dietitian for more information.  What foods should I avoid?  Fruits  Canned fruit in a light or heavy syrup. Fried fruit. Fruit in cream or butter sauce.  Vegetables  Creamed or fried vegetables. Vegetables in a cheese sauce. Regular canned vegetables (not low-sodium or reduced-sodium). Regular canned tomato sauce and paste (not low-sodium or reduced-sodium). Regular tomato and vegetable juice (not low-sodium or reduced-sodium). Pickles. Olives.  Grains  Baked goods made with fat, such as croissants, muffins, or some breads. Dry pasta or rice meal packs.  Meats and other proteins  Fatty cuts of meat. Ribs. Fried meat. Talavera. Bologna, salami, and other precooked or cured meats, such as sausages or meat loaves. Fat from the back of a pig (fatback). Bratwurst. Salted nuts and seeds. Canned beans with added salt. Canned or smoked fish.  Whole eggs or egg yolks. Chicken or turkey with skin.  Dairy  Whole or 2% milk, cream, and half-and-half. Whole or full-fat cream cheese. Whole-fat or sweetened yogurt. Full-fat cheese. Nondairy creamers. Whipped toppings. Processed cheese and cheese spreads.  Fats and oils  Butter. Stick margarine. Lard. Shortening. Ghee. Talavera fat. Tropical oils, such as coconut, palm kernel, or palm oil.  Seasonings and condiments  Onion salt, garlic salt, seasoned salt, table salt, and sea salt. Worcestershire sauce. Tartar sauce. Barbecue sauce. Teriyaki sauce. Soy sauce, including reduced-sodium. Steak sauce. Canned and packaged gravies. Fish sauce. Oyster sauce. Cocktail sauce. Store-bought horseradish. Ketchup. Mustard. Meat flavorings and tenderizers. Bouillon cubes. Hot sauces. Pre-made or packaged marinades. Pre-made or packaged taco seasonings. Relishes. Regular salad dressings.  Other foods  Salted popcorn and pretzels.  The items listed above may not be a complete list of foods and beverages you should avoid. Contact a dietitian for more information.  Where to find more information  National Heart, Lung, and Blood Max: www.nhlbi.nih.gov  American Heart Association: www.heart.org  Academy of Nutrition and Dietetics: www.eatright.org  National Kidney Foundation: www.kidney.org  Summary  The DASH eating plan is a healthy eating plan that has been shown to reduce high blood pressure (hypertension). It may also reduce your risk for type 2 diabetes, heart disease, and stroke.  When on the DASH eating plan, aim to eat more fresh fruits and vegetables, whole grains, lean proteins, low-fat dairy, and heart-healthy fats.  With the DASH eating plan, you should limit salt (sodium) intake to 2,300 mg a day. If you have hypertension, you may need to reduce your sodium intake to 1,500 mg a day.  Work with your health care provider or dietitian to adjust your eating plan to your individual calorie needs.  This information is not  "intended to replace advice given to you by your health care provider. Make sure you discuss any questions you have with your health care provider.  Document Revised: 11/20/2020 Document Reviewed: 11/20/2020  Elsevier Patient Education © 2022 [employer]  BMI for Adults  What is BMI?  Body mass index (BMI) is a number that is calculated from a person's weight and height. BMI can help estimate how much of a person's weight is composed of fat. BMI does not measure body fat directly. Rather, it is an alternative to procedures that directly measure body fat, which can be difficult and expensive.  BMI can help identify people who may be at higher risk for certain medical problems.  What are BMI measurements used for?  BMI is used as a screening tool to identify possible weight problems. It helps determine whether a person is obese, overweight, a healthy weight, or underweight.  BMI is useful for:  Identifying a weight problem that may be related to a medical condition or may increase the risk for medical problems.  Promoting changes, such as changes in diet and exercise, to help reach a healthy weight. BMI screening can be repeated to see if these changes are working.  How is BMI calculated?  BMI involves measuring your weight in relation to your height. Both height and weight are measured, and the BMI is calculated from those numbers. This can be done either in English ([place] or metric measurements. Note that charts and online BMI calculators are available to help you find your BMI quickly and easily without having to do these calculations yourself.  To calculate your BMI in English ([place] measurements:    Measure your weight in pounds (lb).  Multiply the number of pounds by 703.  For example, for a person who weighs 180 lb, multiply that number by 703, which equals 126,540.  Measure your height in inches. Then multiply that number by itself to get a measurement called \"inches squared.\"  For example, for a person who " "is 70 inches tall, the \"inches squared\" measurement is 70 inches x 70 inches, which equals 4,900 inches squared.  Divide the total from step 2 (number of lb x 703) by the total from step 3 (inches squared): 126,540 ÷ 4,900 = 25.8. This is your BMI.  To calculate your BMI in metric measurements:  Measure your weight in kilograms (kg).  Measure your height in meters (m). Then multiply that number by itself to get a measurement called \"meters squared.\"  For example, for a person who is 1.75 m tall, the \"meters squared\" measurement is 1.75 m x 1.75 m, which is equal to 3.1 meters squared.  Divide the number of kilograms (your weight) by the meters squared number. In this example: 70 ÷ 3.1 = 22.6. This is your BMI.  What do the results mean?  BMI charts are used to identify whether you are underweight, normal weight, overweight, or obese. The following guidelines will be used:  Underweight: BMI less than 18.5.  Normal weight: BMI between 18.5 and 24.9.  Overweight: BMI between 25 and 29.9.  Obese: BMI of 30 or above.  Keep these notes in mind:  Weight includes both fat and muscle, so someone with a muscular build, such as an athlete, may have a BMI that is higher than 24.9. In cases like these, BMI is not an accurate measure of body fat.  To determine if excess body fat is the cause of a BMI of 25 or higher, further assessments may need to be done by a health care provider.  BMI is usually interpreted in the same way for men and women.  Where to find more information  For more information about BMI, including tools to quickly calculate your BMI, go to these websites:  Centers for Disease Control and Prevention: www.cdc.gov  American Heart Association: www.heart.org  National Heart, Lung, and Blood [place]: www.nhlbi.nih.gov  Summary  Body mass index (BMI) is a number that is calculated from a person's weight and height.  BMI may help estimate how much of a person's weight is composed of fat. BMI can help identify those " who may be at higher risk for certain medical problems.  BMI can be measured using English measurements or metric measurements.  BMI charts are used to identify whether you are underweight, normal weight, overweight, or obese.  This information is not intended to replace advice given to you by your health care provider. Make sure you discuss any questions you have with your health care provider.  Document Revised: 09/09/2020 Document Reviewed: 07/17/2020  Elsevier Patient Education © 2022 [employer] Inc.

## 2022-09-28 NOTE — PROGRESS NOTES
Chief complaint:   Chief Complaint   Patient presents with   • Back Pain     Pt is here for a follow up for her back pain. When she's laying back in her recliner is the only time it doesn't hurt.        Subjective     HPI: This is a 77-year-old female patient who we saw in the past for some back and leg issues.  She was last seen in August 2021 and her back pain was not playing a significant factor in her day-to-day issues.  She was complaining mostly of knee pain and cramping in her calves.  She comes in today for reevaluation.  The patient states that she did sustain a fall in early July and ended up rolling down a hill.  The patient did have a lot of pain in her tailbone area.  She had initial scans done which did not show any acute abnormality.  Eventually she had an MRI done of her lumbar spine which did show sacral fractures.  The patient says that the pain has been slowly getting better.  She is taking hydrocodone 2-3 times a day.  Denies any lower extremity pain or numbness and tingling.  Denies any bowel or bladder incontinence.  She is using a walker and says the pain is worse with standing and walking but overall her pain is slowly improving.    Review of Systems   Musculoskeletal: Positive for arthralgias, back pain, gait problem and myalgias.   Neurological: Negative for weakness and numbness.   Psychiatric/Behavioral: Negative.    All other systems reviewed and are negative.       Past Medical History:   Diagnosis Date   • Abdominal pain    • Anticoagulant-induced hematuria    • Anxiety    • Arthritis    • Bilateral leg cramps    • Bilateral leg pain    • Calculus of kidney     LEFT   • Clot hematuria    • Cystocele with rectocele    • Dysuria    • Hypertension    • Hypothyroidism    • Low back pain    • Rectocele    • Urgency of urination    • UTI (urinary tract infection) with pyuria    • Venous insufficiency of both lower extremities      Past Surgical History:   Procedure Laterality Date   •  "ANTERIOR AND POSTERIOR VAGINAL REPAIR N/A 2018    Procedure: ANTERIOR AND POSTERIOR VAGINAL REPAIR;  Surgeon: Arlene Ocasio MD;  Location: [facility] OR;  Service: Obstetrics/Gynecology   • CATARACT EXTRACTION W/ INTRAOCULAR LENS IMPLANT     • CHOLECYSTECTOMY     • COLONOSCOPY N/A 2019    Procedure: COLONOSCOPY WITH ANESTHESIA;  Surgeon: Armin Cleaning DO;  Location: [facility] ENDOSCOPY;  Service: Gastroenterology   • CYSTOSCOPY  2002   • ENDOSCOPY N/A 2019    Procedure: ESOPHAGOGASTRODUODENOSCOPY WITH ANESTHESIA;  Surgeon: Armin Cleaning DO;  Location: [facility] ENDOSCOPY;  Service: Gastroenterology   • HERNIA REPAIR     • KNEE ARTHROPLASTY Right 2022   • LAPAROSCOPIC TUBAL LIGATION     • TONSILLECTOMY     • VARICOSE VEIN SURGERY Right 2018    Procedure: RIGHT SAPHENOUS VEIN RADIO FREQUENCY ABLATION;  Surgeon: Rolando Anguiano DO;  Location: [facility] HYBRID OR 12;  Service: Vascular     Family History   Problem Relation Age of Onset   • Prostate cancer Father    • Colon polyps Mother    • Kidney disease Mother    • Colon cancer Neg Hx    • Breast cancer Neg Hx      Social History     Tobacco Use   • Smoking status: Former Smoker     Years: 30.00     Types: Cigarettes     Quit date:      Years since quittin.7   • Smokeless tobacco: Never Used   Vaping Use   • Vaping Use: Never used   Substance Use Topics   • Alcohol use: No   • Drug use: No     (Not in a hospital admission)    Allergies:  Bactrim [sulfamethoxazole-trimethoprim], Penicillins, Vioxx [rofecoxib], Levaquin [levofloxacin], and Sulfa antibiotics    Objective      Vital Signs  Ht 157.5 cm (62\")   Wt 63 kg (139 lb)   BMI 25.42 kg/m²     Physical Exam  Constitutional:       Appearance: Normal appearance. She is well-developed.   HENT:      Head: Normocephalic.   Eyes:      General: Lids are normal.      Extraocular Movements: EOM normal.      Conjunctiva/sclera: Conjunctivae normal.      Pupils: Pupils are equal, " round, and reactive to light.   Cardiovascular:      Rate and Rhythm: Normal rate and regular rhythm.   Pulmonary:      Effort: Pulmonary effort is normal.      Breath sounds: Normal breath sounds.   Musculoskeletal:         General: Normal range of motion.      Cervical back: Normal range of motion.      Comments: Lower lumbar pain   Skin:     General: Skin is warm.   Neurological:      Mental Status: She is alert and oriented to person, place, and time.      GCS: GCS eye subscore is 4. GCS verbal subscore is 5. GCS motor subscore is 6.      Cranial Nerves: No cranial nerve deficit.      Sensory: No sensory deficit.      Gait: Gait abnormal.      Deep Tendon Reflexes: Strength normal and reflexes are normal and symmetric. Reflexes normal.   Psychiatric:         Speech: Speech normal.         Behavior: Behavior normal.         Thought Content: Thought content normal.         Neurologic Exam     Mental Status   Oriented to person, place, and time.   Attention: normal. Concentration: normal.   Speech: speech is normal   Level of consciousness: alert  Normal comprehension.     Cranial Nerves     CN II   Visual fields full to confrontation.     CN III, IV, VI   Pupils are equal, round, and reactive to light.  Extraocular motions are normal.     CN V   Facial sensation intact.     CN VII   Facial expression full, symmetric.     CN VIII   CN VIII normal.     CN IX, X   CN IX normal.   CN X normal.     CN XI   CN XI normal.     CN XII   CN XII normal.     Motor Exam   Muscle bulk: normal    Strength   Strength 5/5 throughout.     Sensory Exam   Light touch normal.     Gait, Coordination, and Reflexes     Reflexes   Reflexes 2+ except as noted. Walking independently with a walker       Imaging review: MRI of the lumbar spine that was done in August 2022 does show slight degenerative scoliosis.  There is a spondylolisthesis noted at L4-5 with facet arthropathy and lumbar stenosis at this area.  There is varying degrees of  lumbar stenosis and foraminal narrowing throughout the lumbar spine.  When compared to the MRI that was done on March 12, 2022 there is not appear to be a significant change in the amount of stenosis present.  No significant change in the degenerative scoliosis is noted.  No change noted in the spondylolisthesis        Assessment/Plan: The patient does have sacral fractures.  I am going to order a CT scan of the pelvis to reevaluate the fractures and make sure that the fracture lines are not progressively getting worse.  If there is concern for worsening fractures we may need to send her over to orthopedics for evaluation.  She was told to call if any further problems or concerns.  At this time we will need to see her on an as-needed basis  Patient is a nonsmoker  The patient's Body mass index is 25.42 kg/m².. BMI is above normal parameters. Recommendations include: educational material and nutrition counseling  Advance Care Planning   ACP discussion was held with the patient during this visit. Patient does not have an advance directive, information provided.  [facility] Fall Risk Assessment was completed, and patient is at HIGH risk for falls. Assessment completed on:9/28/2022       Diagnoses and all orders for this visit:    1. Closed fracture of sacrum, unspecified portion of sacrum, initial encounter (HCC) (Primary)  -     CT pelvis wo contrast; Future    2. Non-smoker          I discussed the patients findings and my recommendations with patient    Brad Franklin, APRN  09/28/22  12:33 CDT

## 2022-11-06 NOTE — H&P
"Subjective   Chief Complaint   Patient presents with   • Vaginal Prolapse     pt here today referred by Akil Marquez for prolapse. pt says she had recurrent UTI's that she says started last July. pt says she feels vaginal discomfort sometimes when up on her feet a lot. pt says she has trouble emptying her bladder. pt also says she has trouble passing stool. pt voices no other concerns.       Dorcas Juarez is a 72 y.o. year old who presents to be seen for pelvic prolapse and recurrent UTI's.  She reports that she often feels like she is \"sitting on something\", but other times it is better.  She thinks that her last GYN exam was about three years ago.    [unfilled]    The patient is not s/p hysterectomy.  She reports positive stool trapping (for several months) and recurrent UTI's since July of 2017, but denies pelvic pain and inability to have intercourse (they are not sexually active any longer).  The patient feels like the problem began years ago.  She is not currently sexually active, and is not interested in being sexually active in the future, but would be willing if her  wanted to resume that activity.  Dorcas Juarez has not had surgery for this problem in the past.    Past Medical History:   Diagnosis Date   • Abdominal pain    • Anticoagulant-induced hematuria    • Calculus of kidney     LEFT   • Clot hematuria    • Dysuria    • Hypothyroidism    • Urgency of urination    • UTI (urinary tract infection) with pyuria        Current Outpatient Prescriptions:   •  calcium citrate-vitamin d (CITRACAL) 200-250 MG-UNIT tablet tablet, Take  by mouth Daily., Disp: , Rfl:   •  cetirizine (ZyrTEC) 10 MG tablet, Take 10 mg by mouth daily., Disp: , Rfl:   •  Estradiol-Estriol-Progesterone (BIEST/PROGESTERONE) cream, , Disp: , Rfl: 5  •  fluticasone (FLONASE) 50 MCG/ACT nasal spray, 2 sprays into each nostril daily. Administer 2 sprays in each nostril for each dose., Disp: , Rfl:   •  glucosamine sulfate 500 MG " capsule capsule, Take  by mouth 3 (Three) Times a Day With Meals., Disp: , Rfl:   •  levothyroxine (SYNTHROID, LEVOTHROID) 125 MCG tablet, Take 125 mcg by mouth daily., Disp: , Rfl:   •  LORazepam (ATIVAN) 1 MG tablet, Take 1 mg by mouth every 8 (eight) hours as needed for anxiety., Disp: , Rfl:   •  losartan (COZAAR) 50 MG tablet, , Disp: , Rfl:   •  meloxicam (MOBIC) 15 MG tablet, Take 1 tablet by mouth daily., Disp: , Rfl: 5  •  Multiple Vitamins-Minerals (MULTIVITAMIN ADULT PO), , Disp: , Rfl:   •  nitrofurantoin, macrocrystal-monohydrate, (MACROBID) 100 MG capsule, , Disp: , Rfl:   •  Omega 3-6-9 Fatty Acids (OMEGA 3-6-9 COMPLEX PO), Take  by mouth., Disp: , Rfl:   •  Omega-3 Fatty Acids (OMEGA-3 FISH OIL PO), Take  by mouth., Disp: , Rfl:   •  temazepam (RESTORIL) 30 MG capsule, Take 1 capsule by mouth daily., Disp: , Rfl: 2  Family History   Problem Relation Age of Onset   • Prostate cancer Father    • No Known Problems Mother      Social History     Social History   • Marital status:      Social History Main Topics   • Smoking status: Former Smoker   • Smokeless tobacco: Never Used   • Alcohol use No   • Drug use: No     Other Topics Concern   • Not on file     Allergies   Allergen Reactions   • Bactrim [Sulfamethoxazole-Trimethoprim] Diarrhea   • Levaquin [Levofloxacin] Unknown (See Comments)     Pt cant remember reaction   • Penicillins    • Sulfa Antibiotics Itching   • Vioxx [Rofecoxib] Diarrhea       Family History   Problem Relation Age of Onset   • Prostate cancer Father    • No Known Problems Mother      Review of Systems   Constitutional: Negative for activity change and unexpected weight change.   Respiratory: Positive for shortness of breath (occasionally, with activity).    Cardiovascular: Negative for chest pain.   Gastrointestinal: Negative for abdominal pain, constipation and diarrhea.        + stool trapping   Genitourinary: Positive for difficulty urinating and vaginal pain  "(pressure/discomfort). Negative for enuresis, vaginal bleeding and vaginal discharge.   Musculoskeletal: Positive for arthralgias and back pain (low back pain).   Neurological: Positive for dizziness (occasionally) and headaches (rare sinus HA's).   Psychiatric/Behavioral: Negative for dysphoric mood. The patient is nervous/anxious.            Objective   /72   Ht 167.6 cm (66\")   Wt 57.6 kg (127 lb)   BMI 20.50 kg/m²      Physical Exam   Constitutional: She is oriented to person, place, and time. She appears well-developed and well-nourished. No distress.   HENT:   Head: Normocephalic and atraumatic.   Pulmonary/Chest: Effort normal.   Abdominal: Soft. There is no tenderness.   Genitourinary: Vagina normal and uterus normal. Pelvic exam was performed with patient supine. There is no tenderness or lesion on the right labia. There is no tenderness or lesion on the left labia. Uterus is not enlarged and not tender. Cervix exhibits no motion tenderness, no discharge and no friability. Right adnexum displays no tenderness and no fullness. Left adnexum displays no tenderness and no fullness. No tenderness or bleeding in the vagina. No signs of injury around the vagina. No vaginal discharge found.   Genitourinary Comments: Minimal descent of uterus.  Grade II cystocele, grade II rectocele.  Vaginal mucosa intact, normal.  External  normal.  No prolapse of urethral mucosa.   Neurological: She is alert and oriented to person, place, and time.   Skin: Skin is warm and dry.   Psychiatric: She has a normal mood and affect. Her behavior is normal. Judgment and thought content normal.   Nursing note and vitals reviewed.      Imaging   No data reviewed       Assessment & Plan      Dorcas was seen today for vaginal prolapse.    Diagnoses and all orders for this visit:    Female cystocele: Experiencing urinary hesitancy and incomplete emptying that results in frequent UTI's.  Offered laparoscopic hysterectomy in " combination with anterior posterior repair, but advised that she does not really need a hysterectomy at this time since she does not have significant prolapse of her uterus and cervix.  The patient is only symptomatically from the prolapse of her anterior and posterior vaginal walls, and wants to avoid any unnecessary surgical risk.  An anterior/posterior repair was described to the patient, and all of her questions answered to her satisfaction.  The patient feels that surgery is appropriate for her and wishes to proceed.  The surgical procedure, including risks and benefits, was described.  Post-op restrictions were reviewed.  All questions were answered to her satisfaction.    -     Case Request; Standing  -     ECG 12 Lead; Future  -     XR Chest 1 View; Future  -     Comprehensive metabolic panel; Future  -     cefOXitin (MEFOXIN) 2 g in sodium chloride 0.9 % 100 mL IVPB; Infuse 2 g into a venous catheter Every 8 (Eight) Hours.  -     Case Request    Pre-op evaluation  -     CBC and Differential; Future    Rectocele: Causing stool trapping.    Former smoker    Other orders  -     Outpatient In A Bed; Standing  -     Follow Anesthesia Guidelines / Standing Orders; Future  -     Follow Anesthesia Guidelines / Standing Orders; Standing  -     Place sequential compression device; Standing  -     Type & Screen; Standing  -     Obtain informed consent; Standing        Arlene Ocasio MD  7/24/2018  10:10 AM       Abnormal Lactate

## 2022-12-12 ENCOUNTER — HOSPITAL ENCOUNTER (OUTPATIENT)
Dept: CT IMAGING | Facility: HOSPITAL | Age: 77
Discharge: HOME | End: 2022-12-12
Admitting: NURSE PRACTITIONER

## 2022-12-12 DIAGNOSIS — S32.10XA CLOSED FRACTURE OF SACRUM, UNSPECIFIED PORTION OF SACRUM, INITIAL ENCOUNTER: ICD-10-CM

## 2022-12-12 PROCEDURE — 72192 CT PELVIS W/O DYE: CPT

## 2022-12-13 ENCOUNTER — TELEPHONE (OUTPATIENT)
Dept: NEUROSURGERY | Facility: CLINIC | Age: 77
End: 2022-12-13

## 2022-12-13 NOTE — TELEPHONE ENCOUNTER
Called and spoke to patient about the CT of the pelvis.  I am recommending that she go see her orthopedic surgeon and discuss it with him.  The patient is agreeable and will have further evaluation with orthopedic surgeon

## 2023-01-05 ENCOUNTER — OFFICE VISIT (OUTPATIENT)
Dept: UROLOGY | Age: 78
End: 2023-01-05
Payer: MEDICARE

## 2023-01-05 VITALS
HEART RATE: 92 BPM | SYSTOLIC BLOOD PRESSURE: 136 MMHG | OXYGEN SATURATION: 94 % | WEIGHT: 142 LBS | TEMPERATURE: 98.1 F | HEIGHT: 63 IN | BODY MASS INDEX: 25.16 KG/M2 | DIASTOLIC BLOOD PRESSURE: 82 MMHG

## 2023-01-05 DIAGNOSIS — R33.9 INCOMPLETE BLADDER EMPTYING: Primary | ICD-10-CM

## 2023-01-05 DIAGNOSIS — Z87.442 HISTORY OF KIDNEY STONES: ICD-10-CM

## 2023-01-05 DIAGNOSIS — N39.0 RECURRENT UTI: ICD-10-CM

## 2023-01-05 LAB
BACTERIA URINE, POC: 0
BILIRUBIN URINE: 0 MG/DL
BLOOD, URINE: POSITIVE
CASTS URINE, POC: 0
CLARITY: CLEAR
COLOR: YELLOW
CRYSTALS URINE, POC: 0
EPI CELLS URINE, POC: 0
GLUCOSE URINE: NORMAL
KETONES, URINE: NEGATIVE
LEUKOCYTE EST, POC: NORMAL
NITRITE, URINE: NEGATIVE
PH UA: 6.5 (ref 4.5–8)
PROTEIN UA: NEGATIVE
RBC URINE, POC: 0
SPECIFIC GRAVITY UA: 1.01 (ref 1–1.03)
UROBILINOGEN, URINE: NORMAL
WBC URINE, POC: 0
YEAST URINE, POC: 0

## 2023-01-05 PROCEDURE — G8484 FLU IMMUNIZE NO ADMIN: HCPCS | Performed by: NURSE PRACTITIONER

## 2023-01-05 PROCEDURE — 1036F TOBACCO NON-USER: CPT | Performed by: NURSE PRACTITIONER

## 2023-01-05 PROCEDURE — 99213 OFFICE O/P EST LOW 20 MIN: CPT | Performed by: NURSE PRACTITIONER

## 2023-01-05 PROCEDURE — 1123F ACP DISCUSS/DSCN MKR DOCD: CPT | Performed by: NURSE PRACTITIONER

## 2023-01-05 PROCEDURE — 3017F COLORECTAL CA SCREEN DOC REV: CPT | Performed by: NURSE PRACTITIONER

## 2023-01-05 PROCEDURE — G8399 PT W/DXA RESULTS DOCUMENT: HCPCS | Performed by: NURSE PRACTITIONER

## 2023-01-05 PROCEDURE — 1090F PRES/ABSN URINE INCON ASSESS: CPT | Performed by: NURSE PRACTITIONER

## 2023-01-05 PROCEDURE — 81001 URINALYSIS AUTO W/SCOPE: CPT | Performed by: NURSE PRACTITIONER

## 2023-01-05 PROCEDURE — G8427 DOCREV CUR MEDS BY ELIG CLIN: HCPCS | Performed by: NURSE PRACTITIONER

## 2023-01-05 PROCEDURE — G8417 CALC BMI ABV UP PARAM F/U: HCPCS | Performed by: NURSE PRACTITIONER

## 2023-01-05 PROCEDURE — 51798 US URINE CAPACITY MEASURE: CPT | Performed by: NURSE PRACTITIONER

## 2023-01-05 RX ORDER — LOSARTAN POTASSIUM 100 MG/1
TABLET ORAL
COMMUNITY
Start: 2022-10-11

## 2023-01-05 RX ORDER — HYDROCODONE BITARTRATE AND ACETAMINOPHEN 10; 325 MG/1; MG/1
1 TABLET ORAL DAILY
COMMUNITY
Start: 2022-12-01

## 2023-01-05 RX ORDER — SILODOSIN 4 MG/1
4 CAPSULE ORAL EVERY EVENING
Qty: 30 CAPSULE | Refills: 11 | Status: SHIPPED | OUTPATIENT
Start: 2023-01-05

## 2023-01-05 ASSESSMENT — ENCOUNTER SYMPTOMS
BACK PAIN: 0
ABDOMINAL DISTENTION: 0
ABDOMINAL PAIN: 0
VOMITING: 0
NAUSEA: 0

## 2023-01-05 NOTE — PROGRESS NOTES
Michelle Chao is a 78 y.o. female who presents today   Chief Complaint   Patient presents with    Follow-up     Pt here for 6 month follow up for incomplete bladder emptying. Patient is a 79-year-old female who presents to clinic today for follow-up. She is a previous patient of Dr. Enid Rosas and was transferred to female urology in [place]. She also has a history of bilateral ESWL in 2019. CT obtained in September 21 completely unremarkable no nonobstructing or obstructing stones. She has history of recurrent UTIs and was previously on Premarin although she stopped this due to cost.  She has had no recurrent UTIs. She does have a history of a bladder surgery approximately 3 years ago and since then has had worsening lower urinary tract symptoms. She continues to have intermittency on urination. It takes her approximately 30 seconds to empty her bladder. She feels she has to relax to empty completely. Unable to tolerate Flomax due to hypotension. She is currently maintained on Rapaflo. Bladder scans have fluctuated around 100 mL PVR. After starting Rapaflo bladder scan revealed 0 mL PVR. Denies any significant constipation currently on fiber.   Bladder scan revealed 36 mL PVR today    Past Medical History:   Diagnosis Date    Allergic rhinitis     Hypertension     Hypothyroidism     Kidney stones     Osteoarthritis        Past Surgical History:   Procedure Laterality Date    CHOLECYSTECTOMY      COLONOSCOPY  04/16/2009    Dr India Sylvester sigmoid diverticula, AP, 2 yr recall    COLONOSCOPY  03/08/2012    Dr Renetta Hernandez, 5 yr recall    COLONOSCOPY  06/07/2019    Dr Leroy Gordon small mouthed diverticula in the sigmoid and descending colon, 5 yr recall    COLONOSCOPY  03/01/2006    Dr India Sylvester diverticulosis, HP, 3 yr recall    COLONOSCOPY N/A 05/16/2022    Dr Jose Luis Tiwari- polyp, left tics, erythema in rectum r/t prolapsed inflamed tissue (RX hydrocortisone)    HERNIA REPAIR DC COLONOSCOPY FLX DX W/COLLJ SPEC WHEN PFRMD N/A 04/13/2017    Dr Alexa Ambrose, 5 yr recall    TONSILLECTOMY      TOTAL KNEE ARTHROPLASTY Right 02/02/2022    RIGHT TOTAL KNEE ARTHROPLASTY performed by Mariaelena Carr MD at [facility] ENDOSCOPY  05/30/2019    Dr Delfino Cannon, gastritis, urease (+)    UPPER GASTROINTESTINAL ENDOSCOPY N/A 05/16/2022    Dr Vida Noyola gasritis, NEG h pylori       Current Outpatient Medications   Medication Sig Dispense Refill    HYDROcodone-acetaminophen (NORCO)  MG per tablet Take 1 tablet by mouth daily. losartan (COZAAR) 100 MG tablet TAKE 1 TABLET BY MOUTH DAILY      silodosin (RAPAFLO) 4 MG CAPS capsule Take 1 capsule by mouth every evening 30 capsule 11    amitriptyline (ELAVIL) 25 MG tablet TAKE 1 TABLET BY MOUTH AT BEDTIME      omeprazole (PRILOSEC) 40 MG delayed release capsule Take 1 capsule by mouth 2 times daily (before meals) 90 capsule 3    levothyroxine (SYNTHROID) 100 MCG tablet Take 100 mcg by mouth Daily      Calcium-Vitamin D-Vitamin K (VIACTIV CALCIUM PLUS D) 650-12.5-40 MG-MCG-MCG CHEW Take 1 tablet by mouth 2 times daily       WAL-ZYR D 5-120 MG per extended release tablet Take 1 tablet by mouth nightly       celecoxib (CELEBREX) 100 MG capsule Take 100 mg by mouth 2 times daily       Cranberry 500 MG TABS Take 1 tablet by mouth daily      gabapentin (NEURONTIN) 100 MG capsule Take 100 mg by mouth nightly.       magnesium (MAGNESIUM-OXIDE) 250 MG TABS tablet Take 500 mg by mouth daily      LORazepam (ATIVAN) 1 MG tablet Take 2 mg by mouth nightly. Multiple Vitamin (MULTIVITAMIN PO) Take 1 tablet by mouth daily       fluticasone (FLONASE) 50 MCG/ACT nasal spray 2 sprays by Nasal route daily as needed (Patient not taking: Reported on 1/5/2023)       No current facility-administered medications for this visit.        Allergies   Allergen Reactions    Flomax [Tamsulosin] Other (See Comments) HOSPITALIZED FOR HYPOTENSION    Other     Levofloxacin Other (See Comments)     Pt cant remember reaction    Pcn [Penicillins] Rash     HAVEN'T TAKEN SINCE CHILDHOOD    Sulfa Antibiotics Rash    Vioxx Diarrhea       Social History     Socioeconomic History    Marital status:    Tobacco Use    Smoking status: Former     Years: 30.00     Types: Cigarettes     Quit date:      Years since quittin.0    Smokeless tobacco: Never   Vaping Use    Vaping Use: Never used   Substance and Sexual Activity    Alcohol use: No    Drug use: No       Family History   Problem Relation Age of Onset    Colon Polyps Mother     Heart Attack Father 62    Colon Cancer Neg Hx     Esophageal Cancer Neg Hx     Liver Cancer Neg Hx     Liver Disease Neg Hx     Rectal Cancer Neg Hx     Stomach Cancer Neg Hx        REVIEW OF SYSTEMS:  Review of Systems   Constitutional:  Negative for chills and fever. Gastrointestinal:  Negative for abdominal distention, abdominal pain, nausea and vomiting. Genitourinary:  Negative for difficulty urinating, dysuria, flank pain, frequency, hematuria and urgency. Intermittency   Musculoskeletal:  Negative for back pain and gait problem. Psychiatric/Behavioral:  Negative for agitation and confusion. PHYSICAL EXAM:  /82   Pulse 92   Temp 98.1 °F (36.7 °C)   Ht 5' 3\" (1.6 m)   Wt 142 lb (64.4 kg)   SpO2 94%   BMI 25.15 kg/m²   Physical Exam  Vitals and nursing note reviewed. Constitutional:       General: She is not in acute distress. Appearance: Normal appearance. She is not ill-appearing. Pulmonary:      Effort: Pulmonary effort is normal. No respiratory distress. Abdominal:      General: There is no distension. Tenderness: There is no abdominal tenderness. There is no right CVA tenderness or left CVA tenderness. Neurological:      Mental Status: She is alert and oriented to person, place, and time. Mental status is at baseline.    Psychiatric:         Mood and Affect: Mood normal.         Behavior: Behavior normal.       DATA:    Results for orders placed or performed in visit on 01/05/23   POCT Urinalysis Dipstick w/ Micro (Auto)   Result Value Ref Range    Color, UA Yellow     Clarity, UA Clear Clear    Glucose, Ur Neg     Bilirubin Urine 0 mg/dL    Ketones, Urine Negative     Specific Gravity, UA 1.015 1.005 - 1.030    Blood, Urine Positive     pH, UA 6.5 4.5 - 8.0    Protein, UA Negative Negative    Nitrite, Urine Negative     Leukocytes, UA Neg     Urobilinogen, Urine 0.2 mg/dL     RBC Urine, POC 0     WBC Urine, POC 0     Bacteria Urine, POC 0     yeast urine, poc 0     Casts Urine, POC 0     Epi Cells Urine, POC 0     crystals urine, poc 0        IMAGING:   Bladder Scan interpretation  Estimation of residual urine via abdominal ultrasound  Residual Urine: 36 ml  Indication: frequency  Position: Supine  Examination: Incremental scanning of the suprapubic area using 3 MHz transducer using copious amounts of acoustic gel. Findings: An anechoic area was demonstrated which represented the bladder, with measurement of residual urine as noted. 1. Incomplete bladder emptying  Patient continues to have intermittency although bladder scan today reveals 36 mL PVR which is improved. Would recommend continuing Rapaflo and relaxation techniques to help empty bladder. Ensure resolution of constipation. We will follow-up 1 year    - CO Measure, post-void residual, US, non-imaging  - POCT Urinalysis Dipstick w/ Micro (Auto)  - silodosin (RAPAFLO) 4 MG CAPS capsule; Take 1 capsule by mouth every evening  Dispense: 30 capsule; Refill: 11    2. History of kidney stones  History of kidney stones currently asymptomatic. CT in September 2021 and was negative for any nonobstructing or obstructing stones. 3. Recurrent UTI  No recent recurrent UTI. Not using Premarin.       Orders Placed This Encounter   Procedures    POCT Urinalysis Dipstick w/ Micro (Auto)    CO Measure, post-void residual, US, non-imaging        Return in about 1 year (around 1/5/2024). All information inputted into the note by the MA to include chief complaint, past medical history, past surgical history, medications, allergies, social and family history and review of systems has been reviewed and updated as needed by me. EMR Dragon/transcription disclaimer: Much of this documentt is electronic  transcription/translation of spoken language to printed text. The  electronic translation of spoken language may be erroneous, or at times,  nonsensical words or phrases may be inadvertently transcribed.  Although I  have reviewed the document for such errors, some may still exist.

## 2023-01-18 RX ORDER — OMEPRAZOLE 40 MG/1
CAPSULE, DELAYED RELEASE ORAL
Qty: 90 CAPSULE | Refills: 3 | OUTPATIENT
Start: 2023-01-18

## 2023-01-18 RX ORDER — OMEPRAZOLE 40 MG/1
40 CAPSULE, DELAYED RELEASE ORAL DAILY
Qty: 30 CAPSULE | Refills: 11 | Status: SHIPPED | OUTPATIENT
Start: 2023-01-18

## 2023-01-18 NOTE — TELEPHONE ENCOUNTER
1-18-23  I tried to call this patient to get her Annual OV scheduled, she did not answer and no VM has been set up. Hopefully the patient will see on caller ID where I called and and phone me back.  Richie anguiano

## 2023-01-18 NOTE — TELEPHONE ENCOUNTER
Duplicate request, CT aprn ES this Rx to [place] 1-17-23 at one daily # 30 x 11.  [facility]

## 2023-02-15 ENCOUNTER — HOSPITAL ENCOUNTER (OUTPATIENT)
Dept: GENERAL RADIOLOGY | Facility: HOSPITAL | Age: 78
Discharge: HOME | End: 2023-02-15
Payer: MEDICARE

## 2023-02-15 ENCOUNTER — TRANSCRIBE ORDERS (OUTPATIENT)
Dept: ADMINISTRATIVE | Facility: HOSPITAL | Age: 78
End: 2023-02-15
Payer: MEDICARE

## 2023-02-15 ENCOUNTER — HOSPITAL ENCOUNTER (OUTPATIENT)
Dept: ULTRASOUND IMAGING | Facility: HOSPITAL | Age: 78
Discharge: HOME | End: 2023-02-15
Payer: MEDICARE

## 2023-02-15 DIAGNOSIS — R22.1 LOCALIZED SWELLING, MASS AND LUMP, NECK: ICD-10-CM

## 2023-02-15 DIAGNOSIS — M79.672 PAIN IN LEFT FOOT: ICD-10-CM

## 2023-02-15 DIAGNOSIS — M79.672 PAIN IN LEFT FOOT: Primary | ICD-10-CM

## 2023-02-15 PROCEDURE — 73630 X-RAY EXAM OF FOOT: CPT

## 2023-02-15 PROCEDURE — 76536 US EXAM OF HEAD AND NECK: CPT

## 2023-05-10 ENCOUNTER — TRANSCRIBE ORDERS (OUTPATIENT)
Dept: ADMINISTRATIVE | Facility: HOSPITAL | Age: 78
End: 2023-05-10
Payer: MEDICARE

## 2023-05-10 DIAGNOSIS — Z12.31 VISIT FOR SCREENING MAMMOGRAM: ICD-10-CM

## 2023-05-10 DIAGNOSIS — Z13.820 SPECIAL SCREENING FOR OSTEOPOROSIS: Primary | ICD-10-CM

## 2024-01-08 ENCOUNTER — OFFICE VISIT (OUTPATIENT)
Dept: UROLOGY | Age: 79
End: 2024-01-08
Payer: MEDICARE

## 2024-01-08 VITALS — HEIGHT: 63 IN | WEIGHT: 140.8 LBS | BODY MASS INDEX: 24.95 KG/M2 | TEMPERATURE: 97.3 F

## 2024-01-08 DIAGNOSIS — N39.0 RECURRENT UTI: ICD-10-CM

## 2024-01-08 DIAGNOSIS — Z87.442 HISTORY OF KIDNEY STONES: ICD-10-CM

## 2024-01-08 DIAGNOSIS — R33.9 INCOMPLETE BLADDER EMPTYING: Primary | ICD-10-CM

## 2024-01-08 LAB
APPEARANCE FLUID: CLEAR
BILIRUBIN, POC: NORMAL
BLOOD URINE, POC: NORMAL
CLARITY, POC: CLEAR
COLOR, POC: NORMAL
GLUCOSE URINE, POC: NORMAL
KETONES, POC: NORMAL
LEUKOCYTE EST, POC: NORMAL
NITRITE, POC: NORMAL
PH, POC: 6
POST VOID RESIDUAL (PVR): 37 ML
PROTEIN, POC: NORMAL
SPECIFIC GRAVITY, POC: 1.01
UROBILINOGEN, POC: 0.2

## 2024-01-08 PROCEDURE — 1036F TOBACCO NON-USER: CPT | Performed by: NURSE PRACTITIONER

## 2024-01-08 PROCEDURE — 81002 URINALYSIS NONAUTO W/O SCOPE: CPT | Performed by: NURSE PRACTITIONER

## 2024-01-08 PROCEDURE — 99213 OFFICE O/P EST LOW 20 MIN: CPT | Performed by: NURSE PRACTITIONER

## 2024-01-08 PROCEDURE — G8427 DOCREV CUR MEDS BY ELIG CLIN: HCPCS | Performed by: NURSE PRACTITIONER

## 2024-01-08 PROCEDURE — 1123F ACP DISCUSS/DSCN MKR DOCD: CPT | Performed by: NURSE PRACTITIONER

## 2024-01-08 PROCEDURE — G8420 CALC BMI NORM PARAMETERS: HCPCS | Performed by: NURSE PRACTITIONER

## 2024-01-08 PROCEDURE — 51798 US URINE CAPACITY MEASURE: CPT | Performed by: NURSE PRACTITIONER

## 2024-01-08 PROCEDURE — 1090F PRES/ABSN URINE INCON ASSESS: CPT | Performed by: NURSE PRACTITIONER

## 2024-01-08 PROCEDURE — G8484 FLU IMMUNIZE NO ADMIN: HCPCS | Performed by: NURSE PRACTITIONER

## 2024-01-08 PROCEDURE — G8399 PT W/DXA RESULTS DOCUMENT: HCPCS | Performed by: NURSE PRACTITIONER

## 2024-01-08 NOTE — PROGRESS NOTES
medical history, past surgical history, medications, allergies, social and family history and review of systems has been reviewed and updated as needed by me.    EMR Dragon/transcription disclaimer: Much of this documentt is electronic  transcription/translation of spoken language to printed text. The  electronic translation of spoken language may be erroneous, or at times,  nonsensical words or phrases may be inadvertently transcribed. Although I  have reviewed the document for such errors, some may still exist.

## 2024-01-10 ASSESSMENT — ENCOUNTER SYMPTOMS
VOMITING: 0
ABDOMINAL DISTENTION: 0
ABDOMINAL PAIN: 0
NAUSEA: 0
BACK PAIN: 0

## 2024-02-29 ENCOUNTER — TELEPHONE (OUTPATIENT)
Dept: UROLOGY | Age: 79
End: 2024-02-29

## 2024-02-29 NOTE — TELEPHONE ENCOUNTER
Tried to reach patient to reschedule appointment for 4/8/2024. Provider will be out of office and appointment needs to be rescheduled with her. Spoke with patient and she has been rescheduled.

## 2024-04-04 ENCOUNTER — TELEPHONE (OUTPATIENT)
Dept: UROLOGY | Age: 79
End: 2024-04-04

## 2024-04-04 NOTE — TELEPHONE ENCOUNTER
contacted patient regarding follow up for 4/9. Reminded patient she would need KUB about 1 hr prior to appointment. She voiced understanding.

## 2024-04-09 ENCOUNTER — OFFICE VISIT (OUTPATIENT)
Dept: UROLOGY | Age: 79
End: 2024-04-09
Payer: MEDICARE

## 2024-04-09 ENCOUNTER — HOSPITAL ENCOUNTER (OUTPATIENT)
Dept: GENERAL RADIOLOGY | Age: 79
Discharge: HOME | End: 2024-04-09
Payer: MEDICARE

## 2024-04-09 VITALS — WEIGHT: 138 LBS | HEIGHT: 62 IN | BODY MASS INDEX: 25.4 KG/M2 | TEMPERATURE: 98.2 F

## 2024-04-09 DIAGNOSIS — R33.9 INCOMPLETE BLADDER EMPTYING: Primary | ICD-10-CM

## 2024-04-09 DIAGNOSIS — Z87.442 HISTORY OF KIDNEY STONES: ICD-10-CM

## 2024-04-09 DIAGNOSIS — N39.0 RECURRENT UTI: ICD-10-CM

## 2024-04-09 LAB
BACTERIA URINE, POC: NORMAL
BILIRUBIN URINE: NORMAL
BLOOD, URINE: POSITIVE
CASTS URINE, POC: NORMAL
CLARITY: CLEAR
COLOR: YELLOW
CRYSTALS URINE, POC: NORMAL
EPI CELLS URINE, POC: NORMAL
GLUCOSE URINE: NORMAL
KETONES, URINE: NEGATIVE
LEUKOCYTE EST, POC: NORMAL
NITRITE, URINE: NEGATIVE
PH UA: 6 (ref 4.5–8)
POST VOID RESIDUAL (PVR): 7 ML
PROTEIN UA: NEGATIVE
RBC URINE, POC: NORMAL
SPECIFIC GRAVITY UA: 1.01 (ref 1–1.03)
UROBILINOGEN, URINE: NORMAL
WBC URINE, POC: NORMAL
YEAST URINE, POC: NORMAL

## 2024-04-09 PROCEDURE — 1090F PRES/ABSN URINE INCON ASSESS: CPT | Performed by: NURSE PRACTITIONER

## 2024-04-09 PROCEDURE — G8427 DOCREV CUR MEDS BY ELIG CLIN: HCPCS | Performed by: NURSE PRACTITIONER

## 2024-04-09 PROCEDURE — 1123F ACP DISCUSS/DSCN MKR DOCD: CPT | Performed by: NURSE PRACTITIONER

## 2024-04-09 PROCEDURE — 81001 URINALYSIS AUTO W/SCOPE: CPT | Performed by: NURSE PRACTITIONER

## 2024-04-09 PROCEDURE — G8419 CALC BMI OUT NRM PARAM NOF/U: HCPCS | Performed by: NURSE PRACTITIONER

## 2024-04-09 PROCEDURE — 99213 OFFICE O/P EST LOW 20 MIN: CPT | Performed by: NURSE PRACTITIONER

## 2024-04-09 PROCEDURE — G8399 PT W/DXA RESULTS DOCUMENT: HCPCS | Performed by: NURSE PRACTITIONER

## 2024-04-09 PROCEDURE — 51798 US URINE CAPACITY MEASURE: CPT | Performed by: NURSE PRACTITIONER

## 2024-04-09 PROCEDURE — 1036F TOBACCO NON-USER: CPT | Performed by: NURSE PRACTITIONER

## 2024-04-09 PROCEDURE — 74018 RADEX ABDOMEN 1 VIEW: CPT

## 2024-04-09 RX ORDER — LORAZEPAM 2 MG/1
TABLET ORAL
COMMUNITY
Start: 2024-03-11

## 2024-04-09 ASSESSMENT — ENCOUNTER SYMPTOMS
ABDOMINAL DISTENTION: 0
ABDOMINAL PAIN: 0
VOMITING: 0
BACK PAIN: 0
NAUSEA: 0

## 2024-04-09 NOTE — PROGRESS NOTES
updated as needed by me.    EMR Dragon/transcription disclaimer: Much of this documentt is electronic  transcription/translation of spoken language to printed text. The  electronic translation of spoken language may be erroneous, or at times,  nonsensical words or phrases may be inadvertently transcribed. Although I  have reviewed the document for such errors, some may still exist.

## 2024-07-03 ENCOUNTER — TRANSCRIBE ORDERS (OUTPATIENT)
Dept: ADMINISTRATIVE | Facility: HOSPITAL | Age: 79
End: 2024-07-03
Payer: MEDICARE

## 2024-07-03 DIAGNOSIS — Z12.31 ENCOUNTER FOR SCREENING MAMMOGRAM FOR MALIGNANT NEOPLASM OF BREAST: Primary | ICD-10-CM

## 2024-07-03 DIAGNOSIS — Z78.0 ASYMPTOMATIC MENOPAUSAL STATE: Primary | ICD-10-CM

## 2024-07-31 ENCOUNTER — HOSPITAL ENCOUNTER (OUTPATIENT)
Dept: MAMMOGRAPHY | Facility: HOSPITAL | Age: 79
Discharge: HOME | End: 2024-07-31
Payer: MEDICARE

## 2024-07-31 ENCOUNTER — HOSPITAL ENCOUNTER (OUTPATIENT)
Dept: BONE DENSITY | Facility: HOSPITAL | Age: 79
Discharge: HOME | End: 2024-07-31
Payer: MEDICARE

## 2024-07-31 DIAGNOSIS — Z78.0 ASYMPTOMATIC MENOPAUSAL STATE: ICD-10-CM

## 2024-07-31 DIAGNOSIS — Z12.31 ENCOUNTER FOR SCREENING MAMMOGRAM FOR MALIGNANT NEOPLASM OF BREAST: ICD-10-CM

## 2024-07-31 PROCEDURE — 77067 SCR MAMMO BI INCL CAD: CPT

## 2024-07-31 PROCEDURE — 77063 BREAST TOMOSYNTHESIS BI: CPT

## 2024-07-31 PROCEDURE — 77080 DXA BONE DENSITY AXIAL: CPT

## 2024-08-01 RX ORDER — OMEPRAZOLE 40 MG/1
CAPSULE, DELAYED RELEASE ORAL
Qty: 30 CAPSULE | Refills: 11 | Status: SHIPPED | OUTPATIENT
Start: 2024-08-01

## 2024-08-01 NOTE — TELEPHONE ENCOUNTER
08- Called voice mail not set up on mobile phone   Called home number-spoke w patient but phone disconnected     The pharmacy has requested a refill on your current medication.  If regular medications are being prescribed our Providers prefers that patients have a yearly follow up apt.     Your last apt was on 06-22-22 w AILEEN WELCH   Last fill 01- w CT APRN         Scheduled for 08- w CT APRN     Routed to CT APRN

## 2024-09-18 ENCOUNTER — NURSE TRIAGE (OUTPATIENT)
Dept: CALL CENTER | Facility: HOSPITAL | Age: 79
End: 2024-09-18
Payer: MEDICARE

## 2024-10-28 DIAGNOSIS — M25.562 LEFT KNEE PAIN, UNSPECIFIED CHRONICITY: Primary | ICD-10-CM

## 2024-10-31 ENCOUNTER — OFFICE VISIT (OUTPATIENT)
Age: 79
End: 2024-10-31

## 2024-10-31 VITALS — HEIGHT: 63 IN | BODY MASS INDEX: 25.52 KG/M2 | WEIGHT: 144 LBS

## 2024-10-31 DIAGNOSIS — G89.29 CHRONIC PAIN OF LEFT KNEE: ICD-10-CM

## 2024-10-31 DIAGNOSIS — M17.12 PRIMARY OSTEOARTHRITIS OF LEFT KNEE: Primary | ICD-10-CM

## 2024-10-31 DIAGNOSIS — M25.562 CHRONIC PAIN OF LEFT KNEE: ICD-10-CM

## 2024-10-31 NOTE — PROGRESS NOTES
incontinence.   Neuro negative   Headache.   Psych negative   Psychiatric symptoms.       PHYSICAL EXAM:    Ht 1.6 m (5' 3\")   Wt 65.3 kg (144 lb)   BMI 25.51 kg/m²      GENERAL: No distress.  ORIENTATION: Alert and oriented to time, place, person.  MOOD AND AFFECT: Cooperative and pleasant.  GAIT EXAMINATION: Reciprocal heel-toe gait without assistive devices.  CARDIOVASCULAR: Symmetric 2 pulses in upper and lower extremity, no significant edema noted.  LYMPHATIC: No cervical or inguinal lymphadenopathy noted.  SENSATION: Grossly intact to light touch.  DEEP TENDON REFLEXES: Normal, no pathological reflexes.  COORDINATION/BALANCE: Normal.  Left Knee Exam     Tenderness   The patient is experiencing tenderness in the medial joint line.    Range of Motion   Extension:  5   Flexion:  110     Tests   Sabino:  Medial - negative Lateral - negative  Varus: negative Valgus: negative  Lachman:  Anterior - negative        Other   Erythema: absent  Scars: absent  Sensation: normal  Pulse: present  Swelling: none  Effusion: no effusion present           Radiology:     AP and PA standing views, a left lateral view, and bilateral sunrise of the left knee was obtained in the office on today's visit, reviewed and interpreted by me.  Imaging quality is adequate for review.  There are no fractures or dislocations present, bone and tissue quality appear normal. She has bone to bone findings to the medial compartment of the left knee.  She has a TM press-fit total knee on the right.     Assessment:   1. Primary osteoarthritis of left knee  2. Chronic pain of left knee       Plan:  Pt is not yet ready to consider joint replacement.  She states that the cortisone injections never worked for her and doesn't want to repeat them. She will call when ready to schedule surgery.  Return if symptoms worsen or fail to improve, for Pt will call to set up surgery when she is ready.        Electronically signed by Quincy Sanches PA-C on

## 2024-11-05 ENCOUNTER — TRANSCRIBE ORDERS (OUTPATIENT)
Dept: ADMINISTRATIVE | Facility: HOSPITAL | Age: 79
End: 2024-11-05
Payer: MEDICARE

## 2024-11-05 ENCOUNTER — HOSPITAL ENCOUNTER (OUTPATIENT)
Dept: GENERAL RADIOLOGY | Facility: HOSPITAL | Age: 79
Discharge: HOME | End: 2024-11-05
Admitting: NURSE PRACTITIONER
Payer: MEDICARE

## 2024-11-05 DIAGNOSIS — R10.9 ABDOMINAL PAIN, UNSPECIFIED ABDOMINAL LOCATION: ICD-10-CM

## 2024-11-05 DIAGNOSIS — R10.9 ABDOMINAL PAIN, UNSPECIFIED ABDOMINAL LOCATION: Primary | ICD-10-CM

## 2024-11-05 PROCEDURE — 74018 RADEX ABDOMEN 1 VIEW: CPT

## 2024-11-06 ENCOUNTER — TRANSCRIBE ORDERS (OUTPATIENT)
Dept: ADMINISTRATIVE | Facility: HOSPITAL | Age: 79
End: 2024-11-06
Payer: MEDICARE

## 2024-11-06 DIAGNOSIS — K21.9 GASTROESOPHAGEAL REFLUX DISEASE WITHOUT ESOPHAGITIS: Primary | ICD-10-CM

## 2024-11-21 ENCOUNTER — HOSPITAL ENCOUNTER (OUTPATIENT)
Dept: GENERAL RADIOLOGY | Facility: HOSPITAL | Age: 79
Discharge: HOME | End: 2024-11-21
Admitting: NURSE PRACTITIONER
Payer: MEDICARE

## 2024-11-21 DIAGNOSIS — K21.9 GASTROESOPHAGEAL REFLUX DISEASE WITHOUT ESOPHAGITIS: ICD-10-CM

## 2024-11-21 PROCEDURE — 74240 X-RAY XM UPR GI TRC 1CNTRST: CPT

## 2024-11-21 PROCEDURE — 63710000001 SOD BICARB-CITRIC ACID-SIMETHICONE 2.21-1.53-0.04 G PACK: Performed by: NURSE PRACTITIONER

## 2024-11-21 PROCEDURE — A9270 NON-COVERED ITEM OR SERVICE: HCPCS | Performed by: NURSE PRACTITIONER

## 2024-11-21 PROCEDURE — 63710000001 BARIUM SULFATE 98 % RECONSTITUTED SUSPENSION: Performed by: NURSE PRACTITIONER

## 2024-11-21 RX ADMIN — BARIUM SULFATE 55 ML: 980 POWDER, FOR SUSPENSION ORAL at 11:53

## 2024-11-21 RX ADMIN — ANTACID/ANTIFLATULENT 1 PACKET: 380; 550; 10; 10 GRANULE, EFFERVESCENT ORAL at 11:53

## 2025-01-09 ENCOUNTER — OFFICE VISIT (OUTPATIENT)
Dept: UROLOGY | Age: 80
End: 2025-01-09
Payer: MEDICARE

## 2025-01-09 VITALS — WEIGHT: 143.6 LBS | TEMPERATURE: 97 F | BODY MASS INDEX: 26.43 KG/M2 | HEIGHT: 62 IN

## 2025-01-09 DIAGNOSIS — R30.0 DYSURIA: ICD-10-CM

## 2025-01-09 DIAGNOSIS — N95.2 VAGINAL ATROPHY: Primary | ICD-10-CM

## 2025-01-09 DIAGNOSIS — Z87.442 HISTORY OF KIDNEY STONES: ICD-10-CM

## 2025-01-09 LAB
BACTERIA URINE, POC: NORMAL
BILIRUBIN URINE: 0 MG/DL
BLOOD, URINE: POSITIVE
CASTS URINE, POC: NORMAL
CLARITY, UA: CLEAR
COLOR, UA: NORMAL
CRYSTALS URINE, POC: NORMAL
EPI CELLS URINE, POC: NORMAL
GLUCOSE URINE: NORMAL
KETONES, URINE: NEGATIVE
LEUKOCYTE EST, POC: POSITIVE
NITRITE, URINE: NEGATIVE
PH UA: 6.5 (ref 4.5–8)
PROTEIN UA: NEGATIVE
RBC URINE, POC: 4
SPECIFIC GRAVITY UA: 1 (ref 1–1.03)
UROBILINOGEN, URINE: NORMAL
WBC URINE, POC: 4
YEAST URINE, POC: NORMAL

## 2025-01-09 PROCEDURE — 99214 OFFICE O/P EST MOD 30 MIN: CPT | Performed by: NURSE PRACTITIONER

## 2025-01-09 PROCEDURE — 1090F PRES/ABSN URINE INCON ASSESS: CPT | Performed by: NURSE PRACTITIONER

## 2025-01-09 PROCEDURE — 81001 URINALYSIS AUTO W/SCOPE: CPT | Performed by: NURSE PRACTITIONER

## 2025-01-09 PROCEDURE — G8428 CUR MEDS NOT DOCUMENT: HCPCS | Performed by: NURSE PRACTITIONER

## 2025-01-09 PROCEDURE — 1036F TOBACCO NON-USER: CPT | Performed by: NURSE PRACTITIONER

## 2025-01-09 PROCEDURE — 1123F ACP DISCUSS/DSCN MKR DOCD: CPT | Performed by: NURSE PRACTITIONER

## 2025-01-09 PROCEDURE — G8399 PT W/DXA RESULTS DOCUMENT: HCPCS | Performed by: NURSE PRACTITIONER

## 2025-01-09 PROCEDURE — G8419 CALC BMI OUT NRM PARAM NOF/U: HCPCS | Performed by: NURSE PRACTITIONER

## 2025-01-09 RX ORDER — OXYCODONE AND ACETAMINOPHEN 5; 325 MG/1; MG/1
1 TABLET ORAL EVERY 4 HOURS PRN
COMMUNITY

## 2025-01-09 RX ORDER — ACYCLOVIR 200 MG/5ML
SUSPENSION ORAL
COMMUNITY

## 2025-01-09 RX ORDER — NITROFURANTOIN 25; 75 MG/1; MG/1
100 CAPSULE ORAL 2 TIMES DAILY
Qty: 20 CAPSULE | Refills: 0 | Status: SHIPPED | OUTPATIENT
Start: 2025-01-09 | End: 2025-01-13

## 2025-01-09 RX ORDER — CICLOPIROX OLAMINE 7.7 MG/G
CREAM TOPICAL
COMMUNITY
Start: 2024-10-17

## 2025-01-09 RX ORDER — HYDROCODONE BITARTRATE AND ACETAMINOPHEN 7.5; 325 MG/1; MG/1
TABLET ORAL
COMMUNITY
Start: 2024-12-11

## 2025-01-09 RX ORDER — ESTRADIOL 0.1 MG/G
0.5 CREAM VAGINAL
Qty: 42.5 G | Refills: 3 | Status: SHIPPED | OUTPATIENT
Start: 2025-01-10

## 2025-01-09 RX ORDER — ALENDRONATE SODIUM 70 MG/1
TABLET ORAL
COMMUNITY

## 2025-01-09 NOTE — PROGRESS NOTES
Vida Trevino is a 79 y.o. female who presents today   Chief Complaint   Patient presents with    Follow-up     I am here today for possible UTI.  Patient states that she is experiencing frequency while urinating as well as a burning feeling in the morning ( not while urinating ). She also states that she feels she is not emptying completely. While urinating she feels she still has the urge to go after she is done.        Patient 79-year-old female presents clinic today with complaints of dysuria.  She reports just a few weeks ago she was treated for UTI at PCP office.  She continues to have intermittent dysuria, urgency, dribbling.  Overall she does not have incontinence.  She was previously on Premarin however this was too costly and she discontinued.  History of incomplete bladder emptying was previously on Rapaflo however this was discontinued overall she is doing well her PVR is low.  She has tried pelvic floor relaxation which does seem to help.  Unable to tolerate Flomax due to hypotension.  She was a previous patient of Dr. Albert who transferred her to female urology [place] difficult for her to get there.  History of bilateral ESWL in 2019.  CT in September 2021 negative for stones.  She has had some dysuria, frequency urgency and nausea however no flank pain.  She really does not think this is a stone.      Past Medical History:   Diagnosis Date    Allergic rhinitis     Hypertension     Hypothyroidism     Kidney stones     Osteoarthritis        Past Surgical History:   Procedure Laterality Date    CHOLECYSTECTOMY      COLONOSCOPY  04/16/2009    Dr Noland-Isolated sigmoid diverticula, AP, 2 yr recall    COLONOSCOPY  03/08/2012    Dr Noland-HP, 5 yr recall    COLONOSCOPY  06/07/2019    Dr Morales-Multiple small mouthed diverticula in the sigmoid and descending colon, 5 yr recall    COLONOSCOPY  03/01/2006    Dr Noland-Isolated diverticulosis, HP, 3 yr recall    COLONOSCOPY N/A 05/16/2022

## 2025-01-11 LAB
BACTERIA UR CULT: ABNORMAL
BACTERIA UR CULT: ABNORMAL
ORGANISM: ABNORMAL

## 2025-01-13 DIAGNOSIS — N30.00 ACUTE CYSTITIS WITHOUT HEMATURIA: Primary | ICD-10-CM

## 2025-01-13 RX ORDER — CEFDINIR 300 MG/1
300 CAPSULE ORAL 2 TIMES DAILY
Qty: 14 CAPSULE | Refills: 0 | Status: SHIPPED | OUTPATIENT
Start: 2025-01-13 | End: 2025-01-20

## 2025-01-13 ASSESSMENT — ENCOUNTER SYMPTOMS
NAUSEA: 0
ABDOMINAL PAIN: 0
BACK PAIN: 0
ABDOMINAL DISTENTION: 0
VOMITING: 0

## 2025-01-13 NOTE — RESULT ENCOUNTER NOTE
Please let patient know urine culture positive for infection.  I discontinue her Macrobid as her culture shows resistance.  I placed her on cefdinir x 7 days.  States she is allergic to penicillins however when I asked her about this in the office she reports she is unsure how accurate those allergies are.  Please let her know there is a potential for cross-reactivity with penicillins and cephalosporins at about 5%.

## 2025-01-14 ENCOUNTER — TELEPHONE (OUTPATIENT)
Dept: UROLOGY | Age: 80
End: 2025-01-14

## 2025-01-14 NOTE — TELEPHONE ENCOUNTER
I have called patient to discuss her positive urine culture, I have called numbers on chart and emergency contacts number is the same as patient's with no VM and no mychart to contact her that way. Patient needs to discontinue her Macrobid and start cefdinir x7 days. Chart says she is allergic to penicillins but when asked in office she was unsure how accurate they are. There is potential for cross reactivity with penicillins and cephalosporins at about 5%. If she does have any issues with the antibiotics she will need to call our office. I will continue trying to call but if she calls back anyone can deliver the message.

## 2025-01-18 ENCOUNTER — HOSPITAL ENCOUNTER (EMERGENCY)
Age: 80
Discharge: HOME | End: 2025-01-18
Attending: PEDIATRICS
Payer: MEDICARE

## 2025-01-18 VITALS
RESPIRATION RATE: 15 BRPM | BODY MASS INDEX: 25.61 KG/M2 | WEIGHT: 140 LBS | OXYGEN SATURATION: 95 % | SYSTOLIC BLOOD PRESSURE: 142 MMHG | DIASTOLIC BLOOD PRESSURE: 83 MMHG | TEMPERATURE: 98.2 F | HEART RATE: 99 BPM

## 2025-01-18 DIAGNOSIS — T78.40XA ALLERGIC REACTION, INITIAL ENCOUNTER: Primary | ICD-10-CM

## 2025-01-18 DIAGNOSIS — N39.0 ACUTE URINARY TRACT INFECTION: ICD-10-CM

## 2025-01-18 PROCEDURE — 2580000003 HC RX 258: Performed by: PEDIATRICS

## 2025-01-18 PROCEDURE — 96374 THER/PROPH/DIAG INJ IV PUSH: CPT

## 2025-01-18 PROCEDURE — 6370000000 HC RX 637 (ALT 250 FOR IP): Performed by: PEDIATRICS

## 2025-01-18 PROCEDURE — 99284 EMERGENCY DEPT VISIT MOD MDM: CPT

## 2025-01-18 PROCEDURE — 2500000003 HC RX 250 WO HCPCS: Performed by: PEDIATRICS

## 2025-01-18 PROCEDURE — 6360000002 HC RX W HCPCS: Performed by: PEDIATRICS

## 2025-01-18 PROCEDURE — 96375 TX/PRO/DX INJ NEW DRUG ADDON: CPT

## 2025-01-18 RX ORDER — GRANULES FOR ORAL 3 G/1
3 POWDER ORAL ONCE
Status: COMPLETED | OUTPATIENT
Start: 2025-01-18 | End: 2025-01-18

## 2025-01-18 RX ORDER — PREDNISONE 50 MG/1
50 TABLET ORAL DAILY
Qty: 5 TABLET | Refills: 0 | Status: SHIPPED | OUTPATIENT
Start: 2025-01-18 | End: 2025-01-23

## 2025-01-18 RX ORDER — DIPHENHYDRAMINE HYDROCHLORIDE 50 MG/ML
25 INJECTION INTRAMUSCULAR; INTRAVENOUS ONCE
Status: COMPLETED | OUTPATIENT
Start: 2025-01-18 | End: 2025-01-18

## 2025-01-18 RX ORDER — HYDROCODONE BITARTRATE AND ACETAMINOPHEN 10; 325 MG/1; MG/1
1 TABLET ORAL ONCE
Status: COMPLETED | OUTPATIENT
Start: 2025-01-18 | End: 2025-01-18

## 2025-01-18 RX ADMIN — SODIUM CHLORIDE, PRESERVATIVE FREE 20 MG: 5 INJECTION INTRAVENOUS at 13:37

## 2025-01-18 RX ADMIN — FOSFOMYCIN TROMETHAMINE 1 PACKET: 3 GRANULE, FOR SOLUTION ORAL at 13:57

## 2025-01-18 RX ADMIN — DIPHENHYDRAMINE HYDROCHLORIDE 25 MG: 50 INJECTION INTRAMUSCULAR; INTRAVENOUS at 13:36

## 2025-01-18 RX ADMIN — WATER 125 MG: 1 INJECTION INTRAMUSCULAR; INTRAVENOUS; SUBCUTANEOUS at 13:36

## 2025-01-18 RX ADMIN — HYDROCODONE BITARTRATE AND ACETAMINOPHEN 1 TABLET: 10; 325 TABLET ORAL at 14:53

## 2025-01-18 ASSESSMENT — ENCOUNTER SYMPTOMS
STRIDOR: 0
SHORTNESS OF BREATH: 1
WHEEZING: 0

## 2025-01-18 ASSESSMENT — PAIN SCALES - GENERAL: PAINLEVEL_OUTOF10: 8

## 2025-01-18 NOTE — DISCHARGE INSTRUCTIONS
Return or seek medical attention with swelling of mouth, lips, or face, difficulty breathing, or other concerns.  Benadryl 25 mg every 6 hours may be taken as needed for rash or itching.  Pepcid 20 mg twice daily for 3 days.

## 2025-01-18 NOTE — ED PROVIDER NOTES
[facility] EMERGENCY DEPARTMENT  eMERGENCY dEPARTMENT eNCOUnter      Pt Name: Vida Trevino  MRN: [id number]  Birthdate 1945  Date of evaluation: 1/18/2025  Provider: Jeni Avila MD    CHIEF COMPLAINT       Chief Complaint   Patient presents with    Allergic Reaction     New antibiotic for UTI, has taken 4 benadryl today, shortness of breath          HISTORY OF PRESENT ILLNESS   (Location/Symptom, Timing/Onset,Context/Setting, Quality, Duration, Modifying Factors, Severity)  Note limiting factors.   Vida Trevino is a 79 y.o. female who presents to the emergency department with allergic reaction.  Patient states that she was taking Macrobid for a recent diagnosis of urinary tract infections.  Patient received a call from the urology office yesterday.  Patient's urinary tract infection is resistant to Macrobid.  Patient began cefadinir yesterday.  Patient was told that there was a small chance that she could have an allergic reaction to the new medication because it was similar to penicillin.  Patient is allergic to Levaquin and sulfa antibiotics as well.  Patient developed a red itchy rash this a.m.  Patient has taken Benadryl 25 mg x 4 tablets this morning.  Last dose was at 10 AM.  Patient believes that she experienced shortness of breath but currently denies difficulty breathing, wheezing, or swelling of her mouth/lips/tongue.  Patient states that her skin rash is extremely itchy.  Patient came to the emergency department for some relief.    HPI    NursingNotes were reviewed.    REVIEW OF SYSTEMS    (2-9 systems for level 4, 10 or more for level 5)     Review of Systems   Respiratory:  Positive for shortness of breath. Negative for wheezing and stridor.    Skin:  Positive for rash.   All other systems reviewed and are negative.           PAST MEDICALHISTORY     Past Medical History:   Diagnosis Date    Allergic rhinitis     Hypertension     Hypothyroidism     Kidney stones     Osteoarthritis   female presents to the emergency department with rash and shortness of breath.  Patient denies shortness of breath at this time.  Patient's lungs are clear to auscultation bilaterally.  Patient has no swelling of mouth/lips/face/tongue.  There is no is no stridor or wheezing present.  Patient saturations are 99% on room air.  Patient is not tachypneic.  Patient has a diffuse urticarial rash.  IV Solu-Medrol, Pepcid, and Benadryl given in the emergency department.    Discussed options for finishing treatment for UTI with maricel Navarro. He suggests fosphomycin- one dose treatment.  This is administered to patient in the ED while patient is observed.  Patient tolerates medication.  Patient will follow up with PCP and Dr Guzman.  Patient will return with difficult breathing, wheezing, swelling of mouth/lips/face/throat, or other concerns.    PROCEDURES:  Unless otherwise noted below, none     Procedures    FINAL IMPRESSION      1. Allergic reaction, initial encounter    2. Acute urinary tract infection          DISPOSITION/PLAN   DISPOSITION Decision To Discharge 01/18/2025 02:47:21 PM   DISPOSITION CONDITION Stable           PATIENT REFERRED TO:  Hero Alonzo MD  [address]  [place] [address]  [phone]    Schedule an appointment as soon as possible for a visit       Javon Guzman MD  [address]  [place] [address]  [phone]    Schedule an appointment as soon as possible for a visit         DISCHARGE MEDICATIONS:  Discharge Medication List as of 1/18/2025  2:49 PM        START taking these medications    Details   predniSONE (DELTASONE) 50 MG tablet Take 1 tablet by mouth daily for 5 days, Disp-5 tablet, R-0Normal                (Please note that portions of this note were completed with a voice recognition program.  Efforts were made to edit thedictations but occasionally words are mis-transcribed.)    Jeni Avila MD (electronically signed)  Attending

## 2025-01-20 ENCOUNTER — TELEPHONE (OUTPATIENT)
Dept: UROLOGY | Age: 80
End: 2025-01-20

## 2025-01-20 NOTE — TELEPHONE ENCOUNTER
Vida called to schedule an appointment for  ED follow up . Pt was seen ay [facility] on 1/18 for UTI.  [facility] was unable to accommodate in the time frame needed. Please be advised that the best time to call Anytime.    Thank you.

## 2025-01-22 NOTE — TELEPHONE ENCOUNTER
Vida called back stating she is doing well so far and will keep her April appointment.     Thank you.

## 2025-04-09 ENCOUNTER — OFFICE VISIT (OUTPATIENT)
Dept: UROLOGY | Age: 80
End: 2025-04-09

## 2025-04-09 VITALS — WEIGHT: 144.6 LBS | BODY MASS INDEX: 26.61 KG/M2 | HEIGHT: 62 IN | TEMPERATURE: 97.3 F

## 2025-04-09 DIAGNOSIS — Z87.442 HISTORY OF KIDNEY STONES: ICD-10-CM

## 2025-04-09 DIAGNOSIS — N95.2 VAGINAL ATROPHY: Primary | ICD-10-CM

## 2025-04-09 DIAGNOSIS — N39.0 RECURRENT UTI: ICD-10-CM

## 2025-04-09 DIAGNOSIS — R30.0 DYSURIA: ICD-10-CM

## 2025-04-09 LAB
BACTERIA URINE, POC: NORMAL
BILIRUBIN URINE: 0 MG/DL
BLOOD, URINE: POSITIVE
CASTS URINE, POC: 0
CLARITY, UA: CLEAR
COLOR, UA: YELLOW
CRYSTALS URINE, POC: 0
EPI CELLS URINE, POC: NORMAL
GLUCOSE URINE: NORMAL
KETONES, URINE: NEGATIVE
LEUKOCYTE EST, POC: NORMAL
NITRITE, URINE: NEGATIVE
PH UA: 6 (ref 4.5–8)
PROTEIN UA: NEGATIVE
RBC URINE, POC: 1
SPECIFIC GRAVITY UA: 1.01 (ref 1–1.03)
UROBILINOGEN, URINE: NORMAL
WBC URINE, POC: 1
YEAST URINE, POC: 0

## 2025-04-09 RX ORDER — PHENAZOPYRIDINE HYDROCHLORIDE 100 MG/1
100 TABLET, FILM COATED ORAL 3 TIMES DAILY PRN
Qty: 15 TABLET | Refills: 1 | Status: SHIPPED | OUTPATIENT
Start: 2025-04-09

## 2025-04-09 NOTE — PROGRESS NOTES
Patient complained of flank pain. After discussing with REBEKAH Stahl I was given verbal orders to obtain cath urine specimen.     After obtaining consent from patient. Patient was then placed in the dorsal lithotomy position. Using sterile technique patient is carefully prepped with Betadine around the urethra. A pediatric catheterization kit is used which contains an approximate 5 [place] catheter. Urethral Catheter is introduced into the urinary bladder. Urine specimen is obtained and sent to the lab for culture and sensitivity. Patient tolerated procedure well.     REBEKAH Stahl was in office at time of procedure.     
of kidney stones and now recurrent UTIs again.  Patient wants to rule out stone burden.  I will call her with her CT results.    - CT ABDOMEN PELVIS WO CONTRAST Additional Contrast? None; Future  - Culture, Urine  - POCT Urinalysis Dipstick w/ Micro (Auto)    4. Recurrent UTI  Patient with UTI back in January 2025 and likely UTI today.  She has not had recurrent UTIs since 2017.  Continue vaginal estrogen and increase fluid intake.  She is emptying her bladder well.  Will obtain CT and call her with results.    - CT ABDOMEN PELVIS WO CONTRAST Additional Contrast? None; Future  - Culture, Urine  - POCT Urinalysis Dipstick w/ Micro (Auto)      Orders Placed This Encounter   Procedures    Culture, Urine    CT ABDOMEN PELVIS WO CONTRAST Additional Contrast? None     For renal stone protocol     Standing Status:   Future     Expected Date:   4/9/2025     Expiration Date:   4/9/2026     Scheduling Instructions:      For renal stone protocol     Additional Contrast?:   None     Reason for exam::   renal colic for stone protocol    POCT Urinalysis Dipstick w/ Micro (Auto)        Return in about 1 year (around 4/9/2026) for CT within 1 wk.    All information inputted into the note by the MA to include chief complaint, past medical history, past surgical history, medications, allergies, social and family history and review of systems has been reviewed and updated as needed by me.    EMR Dragon/transcription disclaimer: Much of this documentt is electronic  transcription/translation of spoken language to printed text. The  electronic translation of spoken language may be erroneous, or at times,  nonsensical words or phrases may be inadvertently transcribed. Although I  have reviewed the document for such errors, some may still exist.

## 2025-04-11 ENCOUNTER — RESULTS FOLLOW-UP (OUTPATIENT)
Dept: UROLOGY | Age: 80
End: 2025-04-11

## 2025-04-11 DIAGNOSIS — N30.00 ACUTE CYSTITIS WITHOUT HEMATURIA: Primary | ICD-10-CM

## 2025-04-11 LAB
BACTERIA UR CULT: ABNORMAL
BACTERIA UR CULT: ABNORMAL
ORGANISM: ABNORMAL

## 2025-04-11 RX ORDER — GRANULES FOR ORAL 3 G/1
3 POWDER ORAL ONCE
Qty: 1 EACH | Refills: 0 | Status: SHIPPED | OUTPATIENT
Start: 2025-04-11 | End: 2025-04-11

## 2025-04-11 ASSESSMENT — ENCOUNTER SYMPTOMS
ABDOMINAL DISTENTION: 0
NAUSEA: 0
ABDOMINAL PAIN: 0
BACK PAIN: 0
VOMITING: 0

## 2025-04-14 ENCOUNTER — HOSPITAL ENCOUNTER (OUTPATIENT)
Dept: CT IMAGING | Age: 80
Discharge: HOME | End: 2025-04-14
Payer: MEDICARE

## 2025-04-14 DIAGNOSIS — N39.0 RECURRENT UTI: ICD-10-CM

## 2025-04-14 DIAGNOSIS — Z87.442 HISTORY OF KIDNEY STONES: ICD-10-CM

## 2025-04-14 PROCEDURE — 74176 CT ABD & PELVIS W/O CONTRAST: CPT

## 2025-04-15 ENCOUNTER — RESULTS FOLLOW-UP (OUTPATIENT)
Dept: UROLOGY | Age: 80
End: 2025-04-15

## 2025-04-16 ENCOUNTER — TELEPHONE (OUTPATIENT)
Dept: UROLOGY | Age: 80
End: 2025-04-16

## 2025-05-13 ENCOUNTER — TRANSCRIBE ORDERS (OUTPATIENT)
Dept: ADMINISTRATIVE | Facility: HOSPITAL | Age: 80
End: 2025-05-13
Payer: MEDICARE

## 2025-05-13 DIAGNOSIS — Z12.31 ENCOUNTER FOR SCREENING MAMMOGRAM FOR MALIGNANT NEOPLASM OF BREAST: Primary | ICD-10-CM

## 2025-08-04 ENCOUNTER — NURSE TRIAGE (OUTPATIENT)
Dept: CALL CENTER | Facility: HOSPITAL | Age: 80
End: 2025-08-04
Payer: MEDICARE

## 2025-08-15 ENCOUNTER — TELEPHONE (OUTPATIENT)
Age: 80
End: 2025-08-15

## (undated) DEVICE — GLOVE SURG SZ 75 CRM LTX FREE POLYISOPRENE POLYMER BEAD ANTI

## (undated) DEVICE — 3M™ STERI-STRIP™ REINFORCED ADHESIVE SKIN CLOSURES, R1547, 1/2 IN X 4 IN (12 MM X 100 MM), 6 STRIPS/ENVELOPE: Brand: 3M™ STERI-STRIP™

## (undated) DEVICE — TBG SMPL FLTR LINE NASL 02/C02 A/ BX/100

## (undated) DEVICE — SYSTEM SKIN CLSR 22CM DERMBND PRINEO

## (undated) DEVICE — SENSR O2 OXIMAX FNGR A/ 18IN NONSTR

## (undated) DEVICE — SYR CONTRL LUERLOK 10CC

## (undated) DEVICE — PK TURNOVER RM ADV

## (undated) DEVICE — SOLUTION IRRIG 3000ML 0.9% SOD CHL USP UROMATIC PLAS CONT

## (undated) DEVICE — ENDO KIT,LOURDES HOSPITAL: Brand: MEDLINE INDUSTRIES, INC.

## (undated) DEVICE — SURGICAL PROCEDURE PACK KNEE TOT DBD CDS LOURDES HOSP LF

## (undated) DEVICE — CUFF,BP,DISP,1 TUBE,ADULT,HP: Brand: MEDLINE

## (undated) DEVICE — SPNG GZ PKNG XRAY/DETECT 4PLY 2X36IN STRL

## (undated) DEVICE — THE CHANNEL CLEANING BRUSH IS A NYLON FLEXI BRUSH ATTACHED TO A FLEXIBLE PLASTIC SHEATH DESIGNED TO SAFELY REMOVE DEBRIS FROM FLEXIBLE ENDOSCOPES.

## (undated) DEVICE — UNDERGLOVE SURG SZ 8 FNGR THK0.21MIL GRN LTX BEAD CUF

## (undated) DEVICE — CATH VASC RF CLOSUREFAST 7CM 7F100CM

## (undated) DEVICE — PAD MAJOR LITHOTOMY: Brand: MEDLINE INDUSTRIES, INC.

## (undated) DEVICE — DUAL CUT SAGITTAL BLADE

## (undated) DEVICE — YANKAUER,BULB TIP WITH VENT: Brand: ARGYLE

## (undated) DEVICE — ELECTRD BLD EDGE/INSUL1P 2.4X5.1MM STRL

## (undated) DEVICE — SHEATH INTRO MICRO 7F 11CM

## (undated) DEVICE — SUTURE VCRL SZ 1 L18IN ABSRB UD L36MM CT-1 1/2 CIR J841D

## (undated) DEVICE — CVR PROB GEN PURP W ISOSILK 6X48

## (undated) DEVICE — SYR LL TP 10ML STRL

## (undated) DEVICE — PAD,ARMBOARD,CONV,FOAM,2X8X20",12PR/CS: Brand: MEDLINE

## (undated) DEVICE — PAD MINOR UNIVERSAL: Brand: MEDLINE INDUSTRIES, INC.

## (undated) DEVICE — NDL SPINE 20G 3 1/2 YEL STRL 1P/U

## (undated) DEVICE — CONMED SCOPE SAVER BITE BLOCK, 20X27 MM: Brand: SCOPE SAVER

## (undated) DEVICE — CLTH CLENS READYCLEANSE PERI CARE PK/5

## (undated) DEVICE — TOTAL TRAY, 16FR 10ML SIL FOLEY, URN: Brand: MEDLINE

## (undated) DEVICE — LARYNGOSCOPE VID MILLER 2 MTL BLADE M HNDL CURAPLEX

## (undated) DEVICE — GLOVE SURG SZ 85 CRM LTX FREE POLYISOPRENE POLYMER BEAD ANTI

## (undated) DEVICE — SPNG GZ STRL 2S 4X4 12PLY

## (undated) DEVICE — GLOVE SURG SZ 85 L12IN FNGR THK79MIL GRN LTX FREE

## (undated) DEVICE — ENDOGATOR AUXILIARY WATER JET CONNECTOR: Brand: ENDOGATOR

## (undated) DEVICE — E-Z CLEAN, NON-STICK, PTFE COATED, ELECTROSURGICAL BLADE ELECTRODE, 6.5 INCH (16.5 CM): Brand: MEGADYNE

## (undated) DEVICE — GLV SURG SENSICARE W/ALOE PF LF 7.5 STRL

## (undated) DEVICE — SUTURE VCRL SZ 2-0 L27IN ABSRB UD L26MM SH 1/2 CIR J417H

## (undated) DEVICE — SUTURE VCRL SZ 2-0 L36IN ABSRB UD L36MM CT-1 1/2 CIR J945H

## (undated) DEVICE — Z DISCONTINUED USE 2272117 DRAPE SURG 3 QTR N INVASIVE 2 LAYR DISP

## (undated) DEVICE — GOWN,PREVENTION PLUS,XL,ST,24/CS: Brand: MEDLINE

## (undated) DEVICE — FRCP BX RADJAW4 NDL 2.8 240 STD OG

## (undated) DEVICE — Device: Brand: MEDEX

## (undated) DEVICE — Device: Brand: DEFENDO AIR/WATER/SUCTION AND BIOPSY VALVE

## (undated) DEVICE — BNDG ADHS CURAD FLX/FABRC 1X3IN STRL LF

## (undated) DEVICE — 3M™ STERI-DRAPE™ INSTRUMENT POUCH 1018: Brand: STERI-DRAPE™

## (undated) DEVICE — BLADE SAW W12.5XL70MM THK1MM RECIP DBL SIDE OFFSET

## (undated) DEVICE — BNDG ELAS ECON W/CLIP 4IN 5YD LF STRL

## (undated) DEVICE — FORCEPS BX 240CM 2.4MM L NDL RAD JAW 4 M00513334

## (undated) DEVICE — DRAINBAG,ANTI-REFLUX TOWER,L/F,2000ML,LL: Brand: MEDLINE

## (undated) DEVICE — ZIMMER® STERILE DISPOSABLE TOURNIQUET CUFF WITH PLC, DUAL PORT, SINGLE BLADDER, 34 IN. (86 CM)

## (undated) DEVICE — ANTIBACTERIAL VIOLET BRAIDED (POLYGLACTIN 910), SYNTHETIC ABSORBABLE SUTURE: Brand: COATED VICRYL

## (undated) DEVICE — NON-WOVEN ADHESIVE WOUND DRESSING: Brand: PRIMAPORE ADHESIVE DRESSING 30*10CM

## (undated) DEVICE — CHLORAPREP 26ML ORANGE

## (undated) DEVICE — SOLUTION IV IRRIG POUR BRL 0.9% SODIUM CHL 2F7124

## (undated) DEVICE — BANDAGE,GAUZE,BULKEE II,4.5"X4.1YD,STRL: Brand: MEDLINE

## (undated) DEVICE — APPL CHLORAPREP W/TINT 26ML ORNG

## (undated) DEVICE — BNDG ELAS W/CLIP 6IN 10YD LF STRL

## (undated) DEVICE — TUBE ET 7MM NSL ORAL BASIC CUF INTMED MURPHY EYE RADPQ MRK

## (undated) DEVICE — SPONGE,LAP,4"X18",XR,ST,5/PK,40PK/CS: Brand: MEDLINE INDUSTRIES, INC.

## (undated) DEVICE — ANTIBACTERIAL UNDYED BRAIDED (POLYGLACTIN 910), SYNTHETIC ABSORBABLE SUTURE: Brand: COATED VICRYL

## (undated) DEVICE — FAN SPRAY KIT: Brand: PULSAVAC®

## (undated) DEVICE — INTENDED FOR TISSUE SEPARATION, AND OTHER PROCEDURES THAT REQUIRE A SHARP SURGICAL BLADE TO PUNCTURE OR CUT.: Brand: BARD-PARKER ® STAINLESS STEEL BLADES

## (undated) DEVICE — PAD,PREPPING,CUFFED,24X48,7",NONSTERILE: Brand: MEDLINE

## (undated) DEVICE — MASK,OXYGEN,MED CONC,ADLT,7' TUB, UC: Brand: PENDING

## (undated) DEVICE — ST TB EXT STANDARDBORE 30IN

## (undated) DEVICE — CATHETER,FOLEY,SILI-ELAST,LTX,20FR,10ML: Brand: MEDLINE

## (undated) DEVICE — ST INF 2NDARY 20DRP VNT/NOVNT 30IN

## (undated) DEVICE — Device: Brand: POWER-FLO®

## (undated) DEVICE — NDL HYPO PRECISIONGLIDE REG 22G 1 1/2

## (undated) DEVICE — GLV SURG SENSICARE W/ALOE PF LF SZ6 STRL

## (undated) DEVICE — GEL ULTRASND HI VISC 20G PACKET STRL

## (undated) DEVICE — GOWN,PRECEPT,XLNG/XXLARGE,STRL: Brand: MEDLINE

## (undated) DEVICE — SYR LUERLOK 30CC